# Patient Record
Sex: MALE | Race: WHITE | NOT HISPANIC OR LATINO | Employment: FULL TIME | RURAL
[De-identification: names, ages, dates, MRNs, and addresses within clinical notes are randomized per-mention and may not be internally consistent; named-entity substitution may affect disease eponyms.]

---

## 2020-08-09 NOTE — PROGRESS NOTES
Subjective:  Sergio Sandoval is a 65 y.o. male who presents for       Patient Active Problem List   Diagnosis   • Mixed hyperlipidemia   • Essential hypertension   • Vitamin D deficiency   • Gastroesophageal reflux disease           Current Outpatient Medications:   •  Acetaminophen (TYLENOL 8 HOUR PO), Take 1,000 mg by mouth 3 (Three) Times a Day., Disp: , Rfl:   •  aspirin (ASPIR) 81 MG EC tablet, Take 81 mg by mouth Daily., Disp: , Rfl:   •  carvedilol (COREG) 6.25 MG tablet, Take 6.25 mg by mouth Daily., Disp: , Rfl:   •  celecoxib (CeleBREX) 200 MG capsule, Take 200 mg by mouth Daily., Disp: , Rfl:   •  flecainide (TAMBOCOR) 50 MG tablet, Take 50 mg by mouth 2 (Two) Times a Day., Disp: , Rfl:   •  Glucosamine-Chondroit-Vit C-Mn (GLUCOSAMINE 1500 COMPLEX PO), Take 1,500 mg by mouth 2 (two) times a day., Disp: , Rfl:   •  Multiple Vitamins-Minerals (MULTIVITAMIN WITH MINERALS) tablet tablet, Take 1 tablet by mouth Daily., Disp: , Rfl:   •  omeprazole (priLOSEC) 20 MG capsule, Take 20 mg by mouth Daily., Disp: , Rfl:   •  Potassium 99 MG tablet, Take  by mouth., Disp: , Rfl:   •  simvastatin (ZOCOR) 80 MG tablet, Take 80 mg by mouth Every Night., Disp: , Rfl:   •  telmisartan-hydrochlorothiazide (MICARDIS HCT) 40-12.5 MG per tablet, Take 1 tablet by mouth Daily., Disp: , Rfl:   •  VITAMIN D, ERGOCALCIFEROL, PO, Take  by mouth., Disp: , Rfl:   •  Zinc 50 MG capsule, Take  by mouth., Disp: , Rfl:   •  tamsulosin (Flomax) 0.4 MG capsule 24 hr capsule, Take 1 capsule by mouth Daily., Disp: 30 capsule, Rfl: 3      Pt is 64 yo male with management of, GERD, HTN, HLP,  CAD, sp shoulder surgery, SVT,     8/21/20 Pt is here to establish.  He is from [place]. 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   He served in  [employer] for 20 years and retired in 2008.  He was seeing  PCP in [facility]. Dr. Gaffney as his regularly miliary PCP.  [facility] He has a CMH of HTN and takes   micardis 40-12.5 mg Po q daily along with coreg 6.25 mg PO BID for HLP pt is on simvastatin 80 mg PO qhs. For GERD pt is on prilsoec 20 mg PO q daily. He also has CAD sp stent and is on aspirin 81 mg daily. Coreg 6.25 mg PO BID simvastatin 80 mg PO qhs micardia -HCTZ. He has SVT and takes flecainide.  He Was diagnosed with atrial fibirillation in .  He had cardiac ablation a year later.  He sees Cardiologist in [place]. He has not seen CArdiologist in years. No chest pain no palpations.. He is not seeing any speicalist currently. No alcohol no illicit drug use no illicit drugs. 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   He has been doing that 5 years.. He is happily .  With 2 biological chidren.   Family history includes prostate cancer (father) both parents are .  His main concern is difficulty going to urinate      Hypertension   This is a chronic problem. The current episode started more than 1 month ago. The problem is unchanged. The problem is uncontrolled. Pertinent negatives include no anxiety, blurred vision, chest pain, headaches, malaise/fatigue, neck pain, orthopnea, palpitations, peripheral edema, PND, shortness of breath or sweats. Risk factors for coronary artery disease include male gender and dyslipidemia. Past treatments include beta blockers, angiotensin blockers and diuretics. Current antihypertension treatment includes beta blockers, angiotensin blockers and diuretics. The current treatment provides moderate improvement. There are no compliance problems.  There is no history of angina, kidney disease, CAD/MI, CVA, heart failure, left ventricular hypertrophy, PVD or retinopathy. There is no history of chronic renal disease, coarctation of the aorta, hyperaldosteronism, hypercortisolism, hyperparathyroidism, a hypertension causing med, pheochromocytoma, renovascular disease, sleep apnea or a thyroid problem.   Hyperlipidemia   This is a chronic  problem. The current episode started more than 1 year ago. He has no history of chronic renal disease. Pertinent negatives include no chest pain or shortness of breath. The current treatment provides mild improvement of lipids. There are no compliance problems.    Heart Problem   This is a chronic problem. The current episode started more than 1 year ago. Associated symptoms include arthralgias, fatigue, numbness and weakness. Pertinent negatives include no abdominal pain, anorexia, chest pain, chills, congestion, coughing, diaphoresis, fever, headaches, nausea, neck pain, rash or vomiting. Nothing aggravates the symptoms. Treatments tried: flecainaide  The treatment provided no relief.   Male  Problem   The patient's pertinent negatives include no genital injury, genital itching, genital lesions, pelvic pain, penile discharge, penile pain, priapism, scrotal swelling or testicular pain. Primary symptoms comment: difficulty urinating. This is a new problem. The problem occurs constantly. The problem has been unchanged. Pertinent negatives include no abdominal pain, anorexia, chest pain, chills, constipation, coughing, diarrhea, discolored urine, dysuria, fever, flank pain, frequency, headaches, hematuria, hesitancy, joint pain, joint swelling, nausea, painful intercourse, rash, shortness of breath, urgency, urinary retention or vomiting. Nothing aggravates the symptoms. He has tried nothing for the symptoms. The treatment provided no relief. He is sexually active. There is no history of chlamydia, cryptorchidism, erectile aid use, erectile dysfunction, a femoral hernia, gonorrhea, herpes simplex, HIV, an inguinal hernia, kidney stones, prostatitis, sickle cell disease, syphilis or varicocele.          Review of Systems  Review of Systems   Constitutional: Positive for activity change and fatigue. Negative for appetite change, chills, diaphoresis, fever and malaise/fatigue.   HENT: Negative for congestion, postnasal  drip, rhinorrhea, sinus pressure, sinus pain, sneezing, trouble swallowing and voice change.    Eyes: Negative for blurred vision.   Respiratory: Negative for cough, choking, chest tightness, shortness of breath, wheezing and stridor.    Cardiovascular: Negative for chest pain, palpitations, orthopnea and PND.   Gastrointestinal: Negative for abdominal pain, anorexia, constipation, diarrhea, nausea and vomiting.   Genitourinary: Positive for difficulty urinating. Negative for discharge, dysuria, flank pain, frequency, hesitancy, pelvic pain, penile pain, scrotal swelling, testicular pain and urgency.   Musculoskeletal: Positive for arthralgias. Negative for joint pain and neck pain.   Skin: Negative for rash.   Neurological: Positive for weakness and numbness. Negative for tremors and headaches.       Patient Active Problem List   Diagnosis   • Mixed hyperlipidemia   • Essential hypertension   • Vitamin D deficiency   • Gastroesophageal reflux disease     Past Surgical History:   Procedure Laterality Date   • CARDIAC ABLATION     • SHOULDER SURGERY       Social History     Socioeconomic History   • Marital status:      Spouse name: Not on file   • Number of children: Not on file   • Years of education: Not on file   • Highest education level: Not on file   Tobacco Use   • Smoking status: Never Smoker   • Smokeless tobacco: Never Used   Substance and Sexual Activity   • Alcohol use: Never     Frequency: Never   • Drug use: Never   • Sexual activity: Defer     Family History   Problem Relation Age of Onset   • Cancer Other    • Hypertension Other    • Stroke Other      No results found for any previous visit.      No image results found.    [unfilled]    There is no immunization history on file for this patient.    The following portions of the patient's history were reviewed and updated as appropriate: allergies, current medications, past family history, past medical history, past social history, past  "surgical history and problem list.        Physical Exam  /84 (BP Location: Right arm, Patient Position: Sitting, Cuff Size: Adult)   Pulse 60   Temp 98.2 °F (36.8 °C) Comment: per screener  Ht 179.1 cm (70.5\")   Wt 81 kg (178 lb 9.6 oz)   SpO2 98%   BMI 25.26 kg/m²     Physical Exam   Constitutional: He is oriented to person, place, and time. He appears well-developed and well-nourished.   HENT:   Head: Normocephalic and atraumatic.   Right Ear: External ear normal.   Eyes: Pupils are equal, round, and reactive to light. Conjunctivae and EOM are normal.   Neck: Normal range of motion. Neck supple.   Cardiovascular: Normal rate, regular rhythm and normal heart sounds.   No murmur heard.  Pulmonary/Chest: Effort normal and breath sounds normal. No respiratory distress.   Abdominal: Soft. Bowel sounds are normal. He exhibits no distension. There is no tenderness.   Musculoskeletal: Normal range of motion. He exhibits no edema or deformity.   Neurological: He is alert and oriented to person, place, and time. No cranial nerve deficit.   Skin: Skin is warm. No rash noted. He is not diaphoretic. No erythema. No pallor.   Psychiatric: He has a normal mood and affect. His behavior is normal.   Nursing note and vitals reviewed.      Assessment/Plan    Diagnosis Plan   1. Difficulty urinating  Urinalysis With Microscopic - Urine, Clean Catch    Urine Culture - Urine, Urine, Clean Catch    US Prostate   2. Encounter for screening for endocrine disorder  CBC Auto Differential    Comprehensive Metabolic Panel    Hemoglobin A1c    Lipid Panel    TSH    T4, Free    Vitamin D 25 Hydroxy    Vitamin B12    Hepatitis C antibody   3. Encounter for screening for diabetes mellitus  CBC Auto Differential    Comprehensive Metabolic Panel    Hemoglobin A1c    Lipid Panel    TSH    T4, Free    Vitamin D 25 Hydroxy    Vitamin B12    Hepatitis C antibody   4. Essential hypertension  CBC Auto Differential    Comprehensive Metabolic " Panel    Hemoglobin A1c    Lipid Panel    TSH    T4, Free    Vitamin D 25 Hydroxy    Vitamin B12    Hepatitis C antibody   5. Mixed hyperlipidemia  CBC Auto Differential    Comprehensive Metabolic Panel    Hemoglobin A1c    Lipid Panel    TSH    T4, Free    Vitamin D 25 Hydroxy    Vitamin B12    Hepatitis C antibody   6. Gastroesophageal reflux disease, esophagitis presence not specified  CBC Auto Differential    Comprehensive Metabolic Panel    Hemoglobin A1c    Lipid Panel    TSH    T4, Free    Vitamin D 25 Hydroxy    Vitamin B12    Hepatitis C antibody   7. Vitamin D deficiency  CBC Auto Differential    Comprehensive Metabolic Panel    Hemoglobin A1c    Lipid Panel    TSH    T4, Free    Vitamin D 25 Hydroxy    Vitamin B12    Hepatitis C antibody   8. Need for hepatitis C screening test  CBC Auto Differential    Comprehensive Metabolic Panel    Hemoglobin A1c    Lipid Panel    TSH    T4, Free    Vitamin D 25 Hydroxy    Vitamin B12    Hepatitis C antibody   9. SVT (supraventricular tachycardia) (CMS/HCC)  Ambulatory Referral to Cardiology   10. Family history of prostate cancer  PSA SCREENING    US Prostate   11. Special screening for malignant neoplasm of prostate  PSA SCREENING    US Prostate        -recommend labwork  -recommend colonoscopy screening   -annual physical exam today  -family history prostate cancer/difficulty urinating - check PSA. Also get US of prostate. Start on flomax 0.4 m PO qhs   -hep C screening - hep C antibody  -SVT -on flecanide 50 mg PO BID.  [facility]    -HLP - on simvastatin 80 mg PO qhs  -HTN  - on micadix 40-12.5 mg PO q daily,  Coreg 6.25 mg PO BID,    -GERD - on prilosec 20 mg PO q daily  -vitamin D defiicency -vitamin D once a week   -advised pt to be safe and call with questions and concerns  -advised pt to go to ER or call 911 if symptoms worrisome or severe  -advised pt to followup with specialist and referrals  -advised pt to be safe during COVID-19  pandemic  -total time with pt >25 minutes   --recheck in 4 weeks         This document has been electronically signed by Oneil Jorgensen MD on August 21, 2020 16:03

## 2020-08-21 ENCOUNTER — OFFICE VISIT (OUTPATIENT)
Dept: FAMILY MEDICINE CLINIC | Facility: CLINIC | Age: 65
End: 2020-08-21

## 2020-08-21 VITALS
TEMPERATURE: 98.2 F | SYSTOLIC BLOOD PRESSURE: 144 MMHG | BODY MASS INDEX: 25 KG/M2 | HEART RATE: 60 BPM | WEIGHT: 178.6 LBS | DIASTOLIC BLOOD PRESSURE: 84 MMHG | OXYGEN SATURATION: 98 % | HEIGHT: 71 IN

## 2020-08-21 DIAGNOSIS — E78.2 MIXED HYPERLIPIDEMIA: ICD-10-CM

## 2020-08-21 DIAGNOSIS — K21.9 GASTROESOPHAGEAL REFLUX DISEASE, ESOPHAGITIS PRESENCE NOT SPECIFIED: ICD-10-CM

## 2020-08-21 DIAGNOSIS — Z80.42 FAMILY HISTORY OF PROSTATE CANCER: ICD-10-CM

## 2020-08-21 DIAGNOSIS — I47.1 SVT (SUPRAVENTRICULAR TACHYCARDIA) (HCC): ICD-10-CM

## 2020-08-21 DIAGNOSIS — E55.9 VITAMIN D DEFICIENCY: ICD-10-CM

## 2020-08-21 DIAGNOSIS — I10 ESSENTIAL HYPERTENSION: ICD-10-CM

## 2020-08-21 DIAGNOSIS — Z13.1 ENCOUNTER FOR SCREENING FOR DIABETES MELLITUS: ICD-10-CM

## 2020-08-21 DIAGNOSIS — Z12.5 SPECIAL SCREENING FOR MALIGNANT NEOPLASM OF PROSTATE: ICD-10-CM

## 2020-08-21 DIAGNOSIS — R39.198 DIFFICULTY URINATING: Primary | ICD-10-CM

## 2020-08-21 DIAGNOSIS — Z11.59 NEED FOR HEPATITIS C SCREENING TEST: ICD-10-CM

## 2020-08-21 DIAGNOSIS — Z13.29 ENCOUNTER FOR SCREENING FOR ENDOCRINE DISORDER: ICD-10-CM

## 2020-08-21 PROCEDURE — 99204 OFFICE O/P NEW MOD 45 MIN: CPT | Performed by: FAMILY MEDICINE

## 2020-08-21 RX ORDER — CARVEDILOL 6.25 MG/1
6.25 TABLET ORAL DAILY
COMMUNITY
End: 2021-06-28 | Stop reason: SDUPTHER

## 2020-08-21 RX ORDER — FLECAINIDE ACETATE 50 MG/1
50 TABLET ORAL 2 TIMES DAILY
COMMUNITY
End: 2021-06-28 | Stop reason: SDUPTHER

## 2020-08-21 RX ORDER — SIMVASTATIN 80 MG
80 TABLET ORAL NIGHTLY
COMMUNITY
End: 2021-06-28 | Stop reason: SDUPTHER

## 2020-08-21 RX ORDER — ASPIRIN 81 MG/1
81 TABLET ORAL DAILY
COMMUNITY
End: 2021-06-28 | Stop reason: SDUPTHER

## 2020-08-21 RX ORDER — TAMSULOSIN HYDROCHLORIDE 0.4 MG/1
1 CAPSULE ORAL DAILY
Qty: 30 CAPSULE | Refills: 3 | Status: SHIPPED | OUTPATIENT
Start: 2020-08-21 | End: 2020-09-05 | Stop reason: SDUPTHER

## 2020-08-21 RX ORDER — CELECOXIB 200 MG/1
200 CAPSULE ORAL DAILY
COMMUNITY
End: 2021-06-28 | Stop reason: SDUPTHER

## 2020-08-21 RX ORDER — MULTIPLE VITAMINS W/ MINERALS TAB 9MG-400MCG
1 TAB ORAL DAILY
COMMUNITY

## 2020-08-21 RX ORDER — OMEPRAZOLE 20 MG/1
20 CAPSULE, DELAYED RELEASE ORAL DAILY
COMMUNITY
End: 2021-06-28 | Stop reason: SDUPTHER

## 2020-08-21 RX ORDER — TELMISARTAN AND HYDROCHLORTHIAZIDE 40; 12.5 MG/1; MG/1
1 TABLET ORAL DAILY
COMMUNITY
End: 2021-06-28 | Stop reason: SDUPTHER

## 2020-08-21 NOTE — PATIENT INSTRUCTIONS
Tamsulosin capsules  What is this medicine?  TAMSULOSIN (redd ROBINSON efren sin) is an alpha blocker. It is used to treat the signs and symptoms of an enlarged prostate in men. This condition is also called benign prostatic hyperplasia (BPH).  This medicine may be used for other purposes; ask your health care provider or pharmacist if you have questions.  COMMON BRAND NAME(S): Flomax  What should I tell my health care provider before I take this medicine?  They need to know if you have any of the following conditions:  · advanced kidney disease  · advanced liver disease  · low blood pressure  · prostate cancer  · an unusual or allergic reaction to tamsulosin, sulfa drugs, other medicines, foods, dyes, or preservatives  · pregnant or trying to get pregnant  · breast-feeding  How should I use this medicine?  Take this medicine by mouth about 30 minutes after the same meal every day. Follow the directions on the prescription label. Swallow the capsules whole with a glass of water. Do not crush, chew, or open capsules. Do not take your medicine more often than directed. Do not stop taking your medicine unless your doctor tells you to.  Talk to your pediatrician regarding the use of this medicine in children. Special care may be needed.  Overdosage: If you think you have taken too much of this medicine contact a poison control center or emergency room at once.  NOTE: This medicine is only for you. Do not share this medicine with others.  What if I miss a dose?  If you miss a dose, take it as soon as you can. If it is almost time for your next dose, take only that dose. Do not take double or extra doses. If you stop taking your medicine for several days or more, ask your doctor or health care professional what dose you should start back on.  What may interact with this medicine?  · cimetidine  · fluoxetine  · ketoconazole  · medicines for erectile disfunction like sildenafil, tadalafil, vardenafil  · medicines for high blood  pressure  · other alpha-blockers like alfuzosin, doxazosin, phentolamine, phenoxybenzamine, prazosin, terazosin  · warfarin  This list may not describe all possible interactions. Give your health care provider a list of all the medicines, herbs, non-prescription drugs, or dietary supplements you use. Also tell them if you smoke, drink alcohol, or use illegal drugs. Some items may interact with your medicine.  What should I watch for while using this medicine?  Visit your doctor or health care professional for regular check ups. You will need lab work done before you start this medicine and regularly while you are taking it. Check your blood pressure as directed. Ask your health care professional what your blood pressure should be, and when you should contact him or her.  This medicine may make you feel dizzy or lightheaded. This is more likely to happen after the first dose, after an increase in dose, or during hot weather or exercise. Drinking alcohol and taking some medicines can make this worse. Do not drive, use machinery, or do anything that needs mental alertness until you know how this medicine affects you. Do not sit or stand up quickly. If you begin to feel dizzy, sit down until you feel better. These effects can decrease once your body adjusts to the medicine.  Contact your doctor or health care professional right away if you have an erection that lasts longer than 4 hours or if it becomes painful. This may be a sign of a serious problem and must be treated right away to prevent permanent damage.  If you are thinking of having cataract surgery, tell your eye surgeon that you have taken this medicine.  What side effects may I notice from receiving this medicine?  Side effects that you should report to your doctor or health care professional as soon as possible:  · allergic reactions like skin rash or itching, hives, swelling of the lips, mouth, tongue, or throat  · breathing problems  · change in  vision  · feeling faint or lightheaded  · irregular heartbeat  · prolonged or painful erection  · weakness  Side effects that usually do not require medical attention (report to your doctor or health care professional if they continue or are bothersome):  · back pain  · change in sex drive or performance  · constipation, nausea or vomiting  · cough  · drowsy  · runny or stuffy nose  · trouble sleeping  This list may not describe all possible side effects. Call your doctor for medical advice about side effects. You may report side effects to FDA at 2-797-HLS-8284.  Where should I keep my medicine?  Keep out of the reach of children.  Store at room temperature between 15 and 30 degrees C (59 and 86 degrees F). Throw away any unused medicine after the expiration date.  NOTE: This sheet is a summary. It may not cover all possible information. If you have questions about this medicine, talk to your doctor, pharmacist, or health care provider.  © 2020 [employer] (2019-05-23 12:54:06)

## 2020-08-31 ENCOUNTER — LAB (OUTPATIENT)
Dept: LAB | Facility: HOSPITAL | Age: 65
End: 2020-08-31

## 2020-08-31 DIAGNOSIS — Z11.59 NEED FOR HEPATITIS C SCREENING TEST: ICD-10-CM

## 2020-08-31 DIAGNOSIS — K21.9 GASTROESOPHAGEAL REFLUX DISEASE, ESOPHAGITIS PRESENCE NOT SPECIFIED: ICD-10-CM

## 2020-08-31 DIAGNOSIS — Z13.29 ENCOUNTER FOR SCREENING FOR ENDOCRINE DISORDER: ICD-10-CM

## 2020-08-31 DIAGNOSIS — E55.9 VITAMIN D DEFICIENCY: ICD-10-CM

## 2020-08-31 DIAGNOSIS — R39.198 DIFFICULTY URINATING: ICD-10-CM

## 2020-08-31 DIAGNOSIS — Z80.42 FAMILY HISTORY OF PROSTATE CANCER: ICD-10-CM

## 2020-08-31 DIAGNOSIS — E78.2 MIXED HYPERLIPIDEMIA: ICD-10-CM

## 2020-08-31 DIAGNOSIS — Z13.1 ENCOUNTER FOR SCREENING FOR DIABETES MELLITUS: ICD-10-CM

## 2020-08-31 DIAGNOSIS — Z12.5 SPECIAL SCREENING FOR MALIGNANT NEOPLASM OF PROSTATE: ICD-10-CM

## 2020-08-31 DIAGNOSIS — I10 ESSENTIAL HYPERTENSION: ICD-10-CM

## 2020-08-31 LAB
25(OH)D3 SERPL-MCNC: 61.2 NG/ML (ref 30–100)
ALBUMIN SERPL-MCNC: 4.6 G/DL (ref 3.5–5.2)
ALBUMIN/GLOB SERPL: 1.8 G/DL
ALP SERPL-CCNC: 59 U/L (ref 39–117)
ALT SERPL W P-5'-P-CCNC: 21 U/L (ref 1–41)
ANION GAP SERPL CALCULATED.3IONS-SCNC: 7.8 MMOL/L (ref 5–15)
AST SERPL-CCNC: 22 U/L (ref 1–40)
BACTERIA UR QL AUTO: NORMAL /HPF
BASOPHILS # BLD AUTO: 0.03 10*3/MM3 (ref 0–0.2)
BASOPHILS NFR BLD AUTO: 0.4 % (ref 0–1.5)
BILIRUB SERPL-MCNC: 0.4 MG/DL (ref 0–1.2)
BILIRUB UR QL STRIP: NEGATIVE
BUN SERPL-MCNC: 10 MG/DL (ref 8–23)
BUN/CREAT SERPL: 9 (ref 7–25)
CALCIUM SPEC-SCNC: 9.7 MG/DL (ref 8.6–10.5)
CHLORIDE SERPL-SCNC: 104 MMOL/L (ref 98–107)
CHOLEST SERPL-MCNC: 135 MG/DL (ref 0–200)
CLARITY UR: CLEAR
CO2 SERPL-SCNC: 30.2 MMOL/L (ref 22–29)
COLOR UR: YELLOW
CREAT SERPL-MCNC: 1.11 MG/DL (ref 0.76–1.27)
DEPRECATED RDW RBC AUTO: 40.3 FL (ref 37–54)
EOSINOPHIL # BLD AUTO: 0.06 10*3/MM3 (ref 0–0.4)
EOSINOPHIL NFR BLD AUTO: 0.8 % (ref 0.3–6.2)
ERYTHROCYTE [DISTWIDTH] IN BLOOD BY AUTOMATED COUNT: 11.8 % (ref 12.3–15.4)
GFR SERPL CREATININE-BSD FRML MDRD: 67 ML/MIN/1.73
GLOBULIN UR ELPH-MCNC: 2.5 GM/DL
GLUCOSE SERPL-MCNC: 106 MG/DL (ref 65–99)
GLUCOSE UR STRIP-MCNC: NEGATIVE MG/DL
HBA1C MFR BLD: 5.7 % (ref 4.8–5.6)
HCT VFR BLD AUTO: 43.8 % (ref 37.5–51)
HCV AB SER DONR QL: NORMAL
HDLC SERPL-MCNC: 56 MG/DL (ref 40–60)
HGB BLD-MCNC: 14.4 G/DL (ref 13–17.7)
HGB UR QL STRIP.AUTO: NEGATIVE
HYALINE CASTS UR QL AUTO: NORMAL /LPF
IMM GRANULOCYTES # BLD AUTO: 0.03 10*3/MM3 (ref 0–0.05)
IMM GRANULOCYTES NFR BLD AUTO: 0.4 % (ref 0–0.5)
KETONES UR QL STRIP: ABNORMAL
LDLC SERPL CALC-MCNC: 68 MG/DL (ref 0–100)
LDLC/HDLC SERPL: 1.21 {RATIO}
LEUKOCYTE ESTERASE UR QL STRIP.AUTO: ABNORMAL
LYMPHOCYTES # BLD AUTO: 1.23 10*3/MM3 (ref 0.7–3.1)
LYMPHOCYTES NFR BLD AUTO: 15.7 % (ref 19.6–45.3)
MCH RBC QN AUTO: 30.6 PG (ref 26.6–33)
MCHC RBC AUTO-ENTMCNC: 32.9 G/DL (ref 31.5–35.7)
MCV RBC AUTO: 93 FL (ref 79–97)
MONOCYTES # BLD AUTO: 0.49 10*3/MM3 (ref 0.1–0.9)
MONOCYTES NFR BLD AUTO: 6.2 % (ref 5–12)
NEUTROPHILS NFR BLD AUTO: 6.01 10*3/MM3 (ref 1.7–7)
NEUTROPHILS NFR BLD AUTO: 76.5 % (ref 42.7–76)
NITRITE UR QL STRIP: NEGATIVE
NRBC BLD AUTO-RTO: 0 /100 WBC (ref 0–0.2)
PH UR STRIP.AUTO: 8 [PH] (ref 5–8)
PLATELET # BLD AUTO: 109 10*3/MM3 (ref 140–450)
PMV BLD AUTO: 10.4 FL (ref 6–12)
POTASSIUM SERPL-SCNC: 4.4 MMOL/L (ref 3.5–5.2)
PROT SERPL-MCNC: 7.1 G/DL (ref 6–8.5)
PROT UR QL STRIP: ABNORMAL
PSA SERPL-MCNC: 1 NG/ML (ref 0–4)
RBC # BLD AUTO: 4.71 10*6/MM3 (ref 4.14–5.8)
RBC # UR: NORMAL /HPF
REF LAB TEST METHOD: NORMAL
SODIUM SERPL-SCNC: 142 MMOL/L (ref 136–145)
SP GR UR STRIP: 1.01 (ref 1–1.03)
SQUAMOUS #/AREA URNS HPF: NORMAL /HPF
T4 FREE SERPL-MCNC: 1.3 NG/DL (ref 0.93–1.7)
TRIGL SERPL-MCNC: 55 MG/DL (ref 0–150)
TSH SERPL DL<=0.05 MIU/L-ACNC: 0.52 UIU/ML (ref 0.27–4.2)
UROBILINOGEN UR QL STRIP: ABNORMAL
VIT B12 BLD-MCNC: 594 PG/ML (ref 211–946)
VLDLC SERPL-MCNC: 11 MG/DL (ref 5–40)
WBC # BLD AUTO: 7.85 10*3/MM3 (ref 3.4–10.8)
WBC UR QL AUTO: NORMAL /HPF

## 2020-08-31 PROCEDURE — 81003 URINALYSIS AUTO W/O SCOPE: CPT

## 2020-08-31 PROCEDURE — 82306 VITAMIN D 25 HYDROXY: CPT

## 2020-08-31 PROCEDURE — 80053 COMPREHEN METABOLIC PANEL: CPT

## 2020-08-31 PROCEDURE — G0103 PSA SCREENING: HCPCS

## 2020-08-31 PROCEDURE — 85025 COMPLETE CBC W/AUTO DIFF WBC: CPT

## 2020-08-31 PROCEDURE — 82607 VITAMIN B-12: CPT

## 2020-08-31 PROCEDURE — 83036 HEMOGLOBIN GLYCOSYLATED A1C: CPT

## 2020-08-31 PROCEDURE — 84443 ASSAY THYROID STIM HORMONE: CPT

## 2020-08-31 PROCEDURE — 87086 URINE CULTURE/COLONY COUNT: CPT

## 2020-08-31 PROCEDURE — 86803 HEPATITIS C AB TEST: CPT

## 2020-08-31 PROCEDURE — 80061 LIPID PANEL: CPT

## 2020-08-31 PROCEDURE — 81015 MICROSCOPIC EXAM OF URINE: CPT

## 2020-08-31 PROCEDURE — 84439 ASSAY OF FREE THYROXINE: CPT

## 2020-09-01 ENCOUNTER — TELEPHONE (OUTPATIENT)
Dept: FAMILY MEDICINE CLINIC | Facility: CLINIC | Age: 65
End: 2020-09-01

## 2020-09-01 LAB — BACTERIA SPEC AEROBE CULT: NO GROWTH

## 2020-09-01 NOTE — TELEPHONE ENCOUNTER
----- Message from Oneil Jorgensen MD sent at 9/1/2020  8:41 AM CDT -----  Please call pt    Vitamin D levels normal     Vitamin B12 levels normal    Hep C antibody test negative    hga1c at 5.7 pt is prediabetic. Will need to watch sugar and carb intake. If hga1c gets >6.5 pt will be diabetic     Thyroid studies normal     On MP kidney function shows GFR at 67 and recommend more water intake.  Liver enzymes normal     Lipid panel is stable    PSA or prostate specific antigen test normal     Urinalysis normal    On CBC hemoglobin stable at 14.4. Platelets are low at 109 and please ask if pt is having brusing or bleeding issues. This helps with clotting blood.  Will need to monitor and consider Hematology referral if consistently low.    Recheck on next visit thanks

## 2020-09-02 NOTE — PROGRESS NOTES
Subjective:  Sergio Sandoval is a 65 y.o. male who presents for       Patient Active Problem List   Diagnosis   • Mixed hyperlipidemia   • Essential hypertension   • Vitamin D deficiency   • Gastroesophageal reflux disease   • Benign prostatic hyperplasia with lower urinary tract symptoms   • Benign prostatic hyperplasia with weak urinary stream   • Prediabetes   • Dizziness   • Thrombocytopenia (CMS/HCC)           Current Outpatient Medications:   •  Acetaminophen (TYLENOL 8 HOUR PO), Take 1,000 mg by mouth 3 (Three) Times a Day., Disp: , Rfl:   •  aspirin (ASPIR) 81 MG EC tablet, Take 81 mg by mouth Daily., Disp: , Rfl:   •  carvedilol (COREG) 6.25 MG tablet, Take 6.25 mg by mouth Daily., Disp: , Rfl:   •  celecoxib (CeleBREX) 200 MG capsule, Take 200 mg by mouth Daily., Disp: , Rfl:   •  flecainide (TAMBOCOR) 50 MG tablet, Take 50 mg by mouth 2 (Two) Times a Day., Disp: , Rfl:   •  Glucosamine-Chondroit-Vit C-Mn (GLUCOSAMINE 1500 COMPLEX PO), Take 1,500 mg by mouth 2 (two) times a day., Disp: , Rfl:   •  Multiple Vitamins-Minerals (MULTIVITAMIN WITH MINERALS) tablet tablet, Take 1 tablet by mouth Daily., Disp: , Rfl:   •  omeprazole (priLOSEC) 20 MG capsule, Take 20 mg by mouth Daily., Disp: , Rfl:   •  Potassium 99 MG tablet, Take  by mouth., Disp: , Rfl:   •  simvastatin (ZOCOR) 80 MG tablet, Take 80 mg by mouth Every Night., Disp: , Rfl:   •  tamsulosin (Flomax) 0.4 MG capsule 24 hr capsule, Take 1 capsule by mouth Daily., Disp: 30 capsule, Rfl: 3  •  telmisartan-hydrochlorothiazide (MICARDIS HCT) 40-12.5 MG per tablet, Take 1 tablet by mouth Daily., Disp: , Rfl:   •  VITAMIN D, ERGOCALCIFEROL, PO, Take  by mouth., Disp: , Rfl:   •  Zinc 50 MG capsule, Take  by mouth., Disp: , Rfl:     HPI        Pt is 64 yo male with management of, GERD, HTN, HLP,  CAD, sp shoulder surgery, SVT, prediabetes, thrombocytopenia      8/21/20 Pt is here to establish.  He is from [place]. 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   He served in  [employer] [facility] for 20 years and retired in .  He was seeing  PCP in [facility]. Dr. Gaffney as his regularly [facility] PCP.  [facility]. He has a CM[facility] of HTN and takes  micardis 40-12.5 mg Po q daily along with coreg 6.25 mg PO BID for HLP pt is on simvastatin 80 mg PO qhs. For GERD pt is on prilsoec 20 mg PO q daily. He also has CAD sp stent and is on aspirin 81 mg daily. Coreg 6.25 mg PO BID simvastatin 80 mg PO qhs micardia -HCTZ. He has SVT and takes flecainide.  He Was diagnosed with atrial fibirillation in .  He had cardiac ablation a year later.  He sees Cardiologist in [place]. He has not seen CArdiologist in years. No chest pain no palpations.. He is not seeing any speicalist currently. No alcohol no illicit drug use no illicit drugs. 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   He has been doing that 5 years.. He is happily .  With 2 biological chidren.   Family history includes prostate cancer (father) both parents are .  His main concern is difficulty going to urinate      9/15/20 in office visit for recheck on pt's above medical issues. Had labwork done on 20 that showed normal PSA at 1.00. hep C screening negative. Vitamin B12 and Vitamin D levels normal thyroid studies normal lipid panel stable. hga1c at 5.7. CMP showed GFR at 67 with liver enzymes normal. CBC showed. platelets low at 109 and normal hemoglobin and normal WBC. On last visit pt was having issues with difficulty urinating and had US of prostate done on 9/10/20 at imaging center that showed postvoid volume of 126 ml. Prostate was at 6.3 cm in diameter and has enlarged prostate. Pt was started on flomax 0.4 mg pO qhs.  He states that since starting flomax he has had improvement with urinating less frequently at bedtime. He does state some vertigo if you stand up too quickly.       Hypertension   This is a chronic problem. The  current episode started more than 1 month ago. The problem is unchanged. The problem is uncontrolled. Pertinent negatives include no anxiety, blurred vision, chest pain, headaches, malaise/fatigue, neck pain, orthopnea, palpitations, peripheral edema, PND, shortness of breath or sweats. Risk factors for coronary artery disease include male gender and dyslipidemia. Past treatments include beta blockers, angiotensin blockers and diuretics. Current antihypertension treatment includes beta blockers, angiotensin blockers and diuretics. The current treatment provides moderate improvement. There are no compliance problems.  There is no history of angina, kidney disease, CAD/MI, CVA, heart failure, left ventricular hypertrophy, PVD or retinopathy. There is no history of chronic renal disease, coarctation of the aorta, hyperaldosteronism, hypercortisolism, hyperparathyroidism, a hypertension causing med, pheochromocytoma, renovascular disease, sleep apnea or a thyroid problem.   Hyperlipidemia   This is a chronic problem. The current episode started more than 1 year ago. He has no history of chronic renal disease. Pertinent negatives include no chest pain or shortness of breath. The current treatment provides mild improvement of lipids. There are no compliance problems.      Male  Problem   The patient's pertinent negatives include no genital injury, genital itching, genital lesions, pelvic pain, penile discharge, penile pain, priapism, scrotal swelling or testicular pain. Primary symptoms comment: difficulty urinating. This is a new problem. The problem occurs constantly. The problem has been improving  Pertinent negatives include no abdominal pain, anorexia, chest pain, chills, constipation, coughing, diarrhea, discolored urine, dysuria, fever, flank pain, frequency, headaches, hematuria, hesitancy, joint pain, joint swelling, nausea, painful intercourse, rash, shortness of breath, urgency, urinary retention or vomiting.  Nothing aggravates the symptoms. He has tried nothing for the symptoms. The treatment provided no relief. He is sexually active. There is no history of chlamydia, cryptorchidism, erectile aid use, erectile dysfunction, a femoral hernia, gonorrhea, herpes simplex, HIV, an inguinal hernia, kidney stones, prostatitis, sickle cell disease, syphilis or varicocele. Has tried flomax and had significant relief          Review of Systems  Review of Systems   Constitutional: Positive for activity change and fatigue.   Genitourinary: Positive for difficulty urinating and dysuria.   Musculoskeletal: Positive for arthralgias.   Neurological: Positive for weakness and numbness. Negative for tremors.   All other systems reviewed and are negative.      Patient Active Problem List   Diagnosis   • Mixed hyperlipidemia   • Essential hypertension   • Vitamin D deficiency   • Gastroesophageal reflux disease   • Benign prostatic hyperplasia with lower urinary tract symptoms   • Benign prostatic hyperplasia with weak urinary stream   • Prediabetes   • Dizziness   • Thrombocytopenia (CMS/HCC)     Past Surgical History:   Procedure Laterality Date   • CARDIAC ABLATION     • SHOULDER SURGERY       Social History     Socioeconomic History   • Marital status:      Spouse name: Not on file   • Number of children: Not on file   • Years of education: Not on file   • Highest education level: Not on file   Tobacco Use   • Smoking status: Never Smoker   • Smokeless tobacco: Never Used   Substance and Sexual Activity   • Alcohol use: Never     Frequency: Never   • Drug use: Never   • Sexual activity: Defer     Family History   Problem Relation Age of Onset   • Cancer Other    • Hypertension Other    • Stroke Other      Lab on 08/31/2020   Component Date Value Ref Range Status   • WBC 08/31/2020 7.85  3.40 - 10.80 10*3/mm3 Final   • RBC 08/31/2020 4.71  4.14 - 5.80 10*6/mm3 Final   • Hemoglobin 08/31/2020 14.4  13.0 - 17.7 g/dL Final   •  Hematocrit 08/31/2020 43.8  37.5 - 51.0 % Final   • MCV 08/31/2020 93.0  79.0 - 97.0 fL Final   • MCH 08/31/2020 30.6  26.6 - 33.0 pg Final   • MCHC 08/31/2020 32.9  31.5 - 35.7 g/dL Final   • RDW 08/31/2020 11.8* 12.3 - 15.4 % Final   • RDW-SD 08/31/2020 40.3  37.0 - 54.0 fl Final   • MPV 08/31/2020 10.4  6.0 - 12.0 fL Final   • Platelets 08/31/2020 109* 140 - 450 10*3/mm3 Final   • Neutrophil % 08/31/2020 76.5* 42.7 - 76.0 % Final   • Lymphocyte % 08/31/2020 15.7* 19.6 - 45.3 % Final   • Monocyte % 08/31/2020 6.2  5.0 - 12.0 % Final   • Eosinophil % 08/31/2020 0.8  0.3 - 6.2 % Final   • Basophil % 08/31/2020 0.4  0.0 - 1.5 % Final   • Immature Grans % 08/31/2020 0.4  0.0 - 0.5 % Final   • Neutrophils, Absolute 08/31/2020 6.01  1.70 - 7.00 10*3/mm3 Final   • Lymphocytes, Absolute 08/31/2020 1.23  0.70 - 3.10 10*3/mm3 Final   • Monocytes, Absolute 08/31/2020 0.49  0.10 - 0.90 10*3/mm3 Final   • Eosinophils, Absolute 08/31/2020 0.06  0.00 - 0.40 10*3/mm3 Final   • Basophils, Absolute 08/31/2020 0.03  0.00 - 0.20 10*3/mm3 Final   • Immature Grans, Absolute 08/31/2020 0.03  0.00 - 0.05 10*3/mm3 Final   • nRBC 08/31/2020 0.0  0.0 - 0.2 /100 WBC Final   • Glucose 08/31/2020 106* 65 - 99 mg/dL Final   • BUN 08/31/2020 10  8 - 23 mg/dL Final   • Creatinine 08/31/2020 1.11  0.76 - 1.27 mg/dL Final   • Sodium 08/31/2020 142  136 - 145 mmol/L Final   • Potassium 08/31/2020 4.4  3.5 - 5.2 mmol/L Final   • Chloride 08/31/2020 104  98 - 107 mmol/L Final   • CO2 08/31/2020 30.2* 22.0 - 29.0 mmol/L Final   • Calcium 08/31/2020 9.7  8.6 - 10.5 mg/dL Final   • Total Protein 08/31/2020 7.1  6.0 - 8.5 g/dL Final   • Albumin 08/31/2020 4.60  3.50 - 5.20 g/dL Final   • ALT (SGPT) 08/31/2020 21  1 - 41 U/L Final   • AST (SGOT) 08/31/2020 22  1 - 40 U/L Final   • Alkaline Phosphatase 08/31/2020 59  39 - 117 U/L Final   • Total Bilirubin 08/31/2020 0.4  0.0 - 1.2 mg/dL Final   • eGFR Non African Amer 08/31/2020 67  >60 mL/min/1.73 Final    • Globulin 08/31/2020 2.5  gm/dL Final   • A/G Ratio 08/31/2020 1.8  g/dL Final   • BUN/Creatinine Ratio 08/31/2020 9.0  7.0 - 25.0 Final   • Anion Gap 08/31/2020 7.8  5.0 - 15.0 mmol/L Final   • Hemoglobin A1C 08/31/2020 5.70* 4.80 - 5.60 % Final   • Total Cholesterol 08/31/2020 135  0 - 200 mg/dL Final   • Triglycerides 08/31/2020 55  0 - 150 mg/dL Final   • HDL Cholesterol 08/31/2020 56  40 - 60 mg/dL Final   • LDL Cholesterol  08/31/2020 68  0 - 100 mg/dL Final   • VLDL Cholesterol 08/31/2020 11  5 - 40 mg/dL Final   • LDL/HDL Ratio 08/31/2020 1.21   Final   • TSH 08/31/2020 0.518  0.270 - 4.200 uIU/mL Final   • Free T4 08/31/2020 1.30  0.93 - 1.70 ng/dL Final   • 25 Hydroxy, Vitamin D 08/31/2020 61.2  30.0 - 100.0 ng/ml Final   • Vitamin B-12 08/31/2020 594  211 - 946 pg/mL Final   • Hepatitis C Ab 08/31/2020 Non-Reactive  Non-Reactive Final   • PSA 08/31/2020 1.000  0.000 - 4.000 ng/mL Final   • Urine Culture 08/31/2020 No growth   Final   • Color, UA 08/31/2020 Yellow  Yellow, Straw Final   • Appearance, UA 08/31/2020 Clear  Clear Final   • pH, UA 08/31/2020 8.0  5.0 - 8.0 Final   • Specific Gravity, UA 08/31/2020 1.010  1.005 - 1.030 Final   • Glucose, UA 08/31/2020 Negative  Negative Final   • Ketones, UA 08/31/2020 15 mg/dL (1+)* Negative Final   • Bilirubin, UA 08/31/2020 Negative  Negative Final   • Blood, UA 08/31/2020 Negative  Negative Final   • Protein, UA 08/31/2020 Trace* Negative Final   • Leuk Esterase, UA 08/31/2020 Small (1+)* Negative Final   • Nitrite, UA 08/31/2020 Negative  Negative Final   • Urobilinogen, UA 08/31/2020 0.2 E.U./dL  0.2 - 1.0 E.U./dL Final   • RBC, UA 08/31/2020 0-2  None Seen, 0-2 /HPF Final   • WBC, UA 08/31/2020 0-2  None Seen, 0-2 /HPF Final   • Bacteria, UA 08/31/2020 None Seen  None Seen /HPF Final   • Squamous Epithelial Cells, UA 08/31/2020 0-2  None Seen, 0-2 /HPF Final   • Hyaline Casts, UA 08/31/2020 None Seen  None Seen /LPF Final   • Methodology 08/31/2020  "Automated Microscopy   Final      No image results found.    [unfilled]  Immunization History   Administered Date(s) Administered   • Flulaval/Fluarix Quad 09/15/2020       The following portions of the patient's history were reviewed and updated as appropriate: allergies, current medications, past family history, past medical history, past social history, past surgical history and problem list.        Physical Exam  /80 (BP Location: Right arm, Patient Position: Sitting, Cuff Size: Adult)   Pulse 62   Temp 97.7 °F (36.5 °C)   Ht 179.1 cm (70.5\")   Wt 83.3 kg (183 lb 9.6 oz)   SpO2 98%   BMI 25.97 kg/m²     Physical Exam   Constitutional: He is oriented to person, place, and time. He appears well-developed.   HENT:   Head: Normocephalic and atraumatic.   Right Ear: External ear normal.   Eyes: Pupils are equal, round, and reactive to light. Conjunctivae are normal.   Neck: Normal range of motion. Neck supple.   Cardiovascular: Normal rate, regular rhythm and normal heart sounds.   No murmur heard.  Pulmonary/Chest: Effort normal and breath sounds normal. No respiratory distress.   Abdominal: Soft. Bowel sounds are normal. He exhibits no distension. There is no abdominal tenderness.   Musculoskeletal: Normal range of motion. No deformity.   Neurological: He is alert and oriented to person, place, and time. No cranial nerve deficit.   Skin: Skin is warm. No rash noted. He is not diaphoretic. No erythema. No pallor.   Psychiatric: His behavior is normal.   Nursing note and vitals reviewed.      Assessment/Plan    Diagnosis Plan   1. Prediabetes  CBC Auto Differential    Comprehensive Metabolic Panel    Hemoglobin A1c   2. Mixed hyperlipidemia     3. Essential hypertension     4. Vitamin D deficiency     5. Gastroesophageal reflux disease, esophagitis presence not specified     6. Benign prostatic hyperplasia with weak urinary stream     7. Dizziness     8. Thrombocytopenia (CMS/HCC)  CBC Auto " Differential    Comprehensive Metabolic Panel    Hemoglobin A1c   9. Need for immunization against influenza  FluLaval Quad >6 Months ([id number])        -went over labwork  -recommend influenza vaccination -given today   -recommend tdap vaccination  -recommend colonoscopy screening - was done in 2 years. Will get records from [facility]   -dizziness - may be due to  Flomax. Start on flomax every other night instead of nightly.    -BPH - reviewed recent US of prostate.- on flomax 0.4 mg PO qhs.    -prediabetes - prediabetes meal plan information provided  -thrombocytopenia - continue to monitor. Pt reports no active bleeding or bruising. Consider Hematology referral   -family history prostate cancer/difficulty urinating/BPH - recent PSA normal .recent US of prostate shows enlarged prostate . Start on flomax 0.4 m PO qhs   -hep C screening - hep C antibody  -SVT -on flecanide 50 mg PO BID.  [facility]    -HLP - on simvastatin 80 mg PO qhs  -HTN  - on micadix 40-12.5 mg PO q daily,  Coreg 6.25 mg PO BID,    -GERD - on prilosec 20 mg PO q daily  -vitamin D defiicency -vitamin D once a week   -advised pt to be safe and call with questions and concerns  -advised pt to go to ER or call 911 if symptoms worrisome or severe  -advised pt to followup with specialist and referrals  -advised pt to be safe during COVID-19 pandemic  -total time with pt >25 minutes    -recheck in 3 months         This document has been electronically signed by Oneil Jorgensen MD on September 15, 2020 12:08 CDT                Answers for HPI/ROS submitted by the patient on 9/14/2020   What is the primary reason for your visit?: Other  Please describe your symptoms.: New patient  Have you had these symptoms before?: No  How long have you been having these symptoms?: 1-4 days

## 2020-09-08 RX ORDER — TAMSULOSIN HYDROCHLORIDE 0.4 MG/1
1 CAPSULE ORAL DAILY
Qty: 30 CAPSULE | Refills: 3 | Status: SHIPPED | OUTPATIENT
Start: 2020-09-08 | End: 2020-09-27 | Stop reason: SDUPTHER

## 2020-09-09 ENCOUNTER — TELEPHONE (OUTPATIENT)
Dept: FAMILY MEDICINE CLINIC | Facility: CLINIC | Age: 65
End: 2020-09-09

## 2020-09-09 DIAGNOSIS — R39.198 DIFFICULTY URINATING: ICD-10-CM

## 2020-09-09 DIAGNOSIS — Z80.42 FAMILY HISTORY OF PROSTATE CANCER: Primary | ICD-10-CM

## 2020-09-09 DIAGNOSIS — Z12.5 SPECIAL SCREENING FOR MALIGNANT NEOPLASM OF PROSTATE: ICD-10-CM

## 2020-09-15 ENCOUNTER — OFFICE VISIT (OUTPATIENT)
Dept: FAMILY MEDICINE CLINIC | Facility: CLINIC | Age: 65
End: 2020-09-15

## 2020-09-15 VITALS
WEIGHT: 183.6 LBS | OXYGEN SATURATION: 98 % | HEIGHT: 71 IN | SYSTOLIC BLOOD PRESSURE: 108 MMHG | HEART RATE: 62 BPM | DIASTOLIC BLOOD PRESSURE: 80 MMHG | TEMPERATURE: 97.7 F | BODY MASS INDEX: 25.7 KG/M2

## 2020-09-15 DIAGNOSIS — I10 ESSENTIAL HYPERTENSION: ICD-10-CM

## 2020-09-15 DIAGNOSIS — D69.6 THROMBOCYTOPENIA (HCC): ICD-10-CM

## 2020-09-15 DIAGNOSIS — R42 DIZZINESS: ICD-10-CM

## 2020-09-15 DIAGNOSIS — R73.03 PREDIABETES: Primary | ICD-10-CM

## 2020-09-15 DIAGNOSIS — N40.1 BENIGN PROSTATIC HYPERPLASIA WITH WEAK URINARY STREAM: ICD-10-CM

## 2020-09-15 DIAGNOSIS — Z23 NEED FOR IMMUNIZATION AGAINST INFLUENZA: ICD-10-CM

## 2020-09-15 DIAGNOSIS — E78.2 MIXED HYPERLIPIDEMIA: ICD-10-CM

## 2020-09-15 DIAGNOSIS — E55.9 VITAMIN D DEFICIENCY: ICD-10-CM

## 2020-09-15 DIAGNOSIS — R39.12 BENIGN PROSTATIC HYPERPLASIA WITH WEAK URINARY STREAM: ICD-10-CM

## 2020-09-15 DIAGNOSIS — K21.9 GASTROESOPHAGEAL REFLUX DISEASE, ESOPHAGITIS PRESENCE NOT SPECIFIED: ICD-10-CM

## 2020-09-15 PROCEDURE — 90686 IIV4 VACC NO PRSV 0.5 ML IM: CPT | Performed by: FAMILY MEDICINE

## 2020-09-15 PROCEDURE — 90471 IMMUNIZATION ADMIN: CPT | Performed by: FAMILY MEDICINE

## 2020-09-15 PROCEDURE — 99214 OFFICE O/P EST MOD 30 MIN: CPT | Performed by: FAMILY MEDICINE

## 2020-09-17 DIAGNOSIS — Z80.42 FAMILY HISTORY OF PROSTATE CANCER: ICD-10-CM

## 2020-09-17 DIAGNOSIS — Z12.5 SPECIAL SCREENING FOR MALIGNANT NEOPLASM OF PROSTATE: ICD-10-CM

## 2020-09-17 DIAGNOSIS — R39.198 DIFFICULTY URINATING: ICD-10-CM

## 2020-09-28 RX ORDER — TAMSULOSIN HYDROCHLORIDE 0.4 MG/1
1 CAPSULE ORAL DAILY
Qty: 30 CAPSULE | Refills: 3 | Status: SHIPPED | OUTPATIENT
Start: 2020-09-28 | End: 2020-10-11 | Stop reason: SDUPTHER

## 2020-10-12 RX ORDER — TAMSULOSIN HYDROCHLORIDE 0.4 MG/1
1 CAPSULE ORAL DAILY
Qty: 30 CAPSULE | Refills: 3 | Status: SHIPPED | OUTPATIENT
Start: 2020-10-12 | End: 2020-11-15 | Stop reason: SDUPTHER

## 2020-11-16 RX ORDER — TAMSULOSIN HYDROCHLORIDE 0.4 MG/1
1 CAPSULE ORAL DAILY
Qty: 30 CAPSULE | Refills: 3 | Status: SHIPPED | OUTPATIENT
Start: 2020-11-16 | End: 2021-06-28 | Stop reason: SDUPTHER

## 2020-12-01 ENCOUNTER — TELEPHONE (OUTPATIENT)
Dept: FAMILY MEDICINE CLINIC | Facility: CLINIC | Age: 65
End: 2020-12-01

## 2021-06-14 NOTE — PROGRESS NOTES
Chief Complaint  Med Refill, Skin Lesion, Hypertension, Hyperlipidemia, Vitamin D Deficiency, Prediabetes, and Heartburn    Subjective          Sergio Sandoval presents to [facility]     Pt is 64 yo male with management of, GERD, HTN, HLP,  CAD, sp shoulder surgery, SVT, prediabetes, thrombocytopenia         9/15/20 in office visit for recheck on pt's above medical issues. Had labwork done on 8/31/20 that showed normal PSA at 1.00. hep C screening negative. Vitamin B12 and Vitamin D levels normal thyroid studies normal lipid panel stable. hga1c at 5.7. CMP showed GFR at 67 with liver enzymes normal. CBC showed. platelets low at 109 and normal hemoglobin and normal WBC. On last visit pt was having issues with difficulty urinating and had US of prostate done on 9/10/20 at [facility] center that showed postvoid volume of 126 ml. Prostate was at 6.3 cm in diameter and has enlarged prostate. Pt was started on flomax 0.4 mg pO qhs.  He states that since starting flomax he has had improvement with urinating less frequently at bedtime. He does state some vertigo if you stand up too quickly.      6/28/21 in office visit for recheck on pt's above medical issues. Pt has yet to get labwork ordered on 9/15/20 . Pt continues to take his medications for HTN, HLP, CAD, SVT, GERD. [facility] and he has yet to see one but now he has Medicare. Pt has history of SVT diagnosed in 1992 while serving in  . He had radiofrequency ablation for his SVT 1993. Done  In [place] at [facility]. He had a cyrogenic ablation in 2013 for his atrial fibrillation. After medications failed to control it.  He has been in NSR since then.  Pt also has a cyst on his middle back that has been present for years. It is getting bigger and it can get irritated at times. Currently there is no drainage     Heart Problem  This is a chronic problem. The current episode started more than 1 year  ago. The problem occurs constantly. The problem has been unchanged. Associated symptoms include arthralgias and fatigue. Pertinent negatives include no abdominal pain, anorexia, change in bowel habit, chest pain, chills, congestion, coughing, diaphoresis, fever, headaches, joint swelling, myalgias, nausea, neck pain, numbness, sore throat, swollen glands, urinary symptoms, vertigo, visual change, vomiting or weakness. The symptoms are aggravated by bending. Treatments tried: flecanaide  The treatment provided significant relief.   Hypertension   This is a chronic problem. The current episode started more than 1 month ago. The problem is unchanged. The problem is uncontrolled. Pertinent negatives include no anxiety, blurred vision, chest pain, headaches, malaise/fatigue, neck pain, orthopnea, palpitations, peripheral edema, PND, shortness of breath or sweats. Risk factors for coronary artery disease include male gender and dyslipidemia. Past treatments include beta blockers, angiotensin blockers and diuretics. Current antihypertension treatment includes beta blockers, angiotensin blockers and diuretics. The current treatment provides moderate improvement. There are no compliance problems.  There is no history of angina, kidney disease, CAD/MI, CVA, heart failure, left ventricular hypertrophy, PVD or retinopathy. There is no history of chronic renal disease, coarctation of the aorta, hyperaldosteronism, hypercortisolism, hyperparathyroidism, a hypertension causing med, pheochromocytoma, renovascular disease, sleep apnea or a thyroid problem.   Hyperlipidemia   This is a chronic problem. The current episode started more than 1 year ago. He has no history of chronic renal disease. Pertinent negatives include no chest pain or shortness of breath. The current treatment provides mild improvement of lipids. There are no compliance problems.       Male  Problem   The patient's pertinent negatives include no genital  "injury, genital itching, genital lesions, pelvic pain, penile discharge, penile pain, priapism, scrotal swelling or testicular pain. Primary symptoms comment: difficulty urinating. This is a new problem. The problem occurs constantly. The problem has been improving  Pertinent negatives include no abdominal pain, anorexia, chest pain, chills, constipation, coughing, diarrhea, discolored urine, dysuria, fever, flank pain, frequency, headaches, hematuria, hesitancy, joint pain, joint swelling, nausea, painful intercourse, rash, shortness of breath, urgency, urinary retention or vomiting. Nothing aggravates the symptoms. He has tried nothing for the symptoms. The treatment provided no relief. He is sexually active. There is no history of chlamydia, cryptorchidism, erectile aid use, erectile dysfunction, a femoral hernia, gonorrhea, herpes simplex, HIV, an inguinal hernia, kidney stones, prostatitis, sickle cell disease, syphilis or varicocele. Has tried flomax and had significant relief   Objective   Vital Signs:   /90 (BP Location: Left arm, Patient Position: Sitting, Cuff Size: Adult)   Pulse 80   Temp 97.3 °F (36.3 °C)   Ht 177.8 cm (70\")   Wt 84.4 kg (186 lb)   SpO2 98%   BMI 26.69 kg/m²     Physical Exam  Vitals and nursing note reviewed.   Constitutional:       Appearance: He is well-developed. He is not diaphoretic.   HENT:      Head: Normocephalic and atraumatic.      Right Ear: External ear normal.   Eyes:      Conjunctiva/sclera: Conjunctivae normal.      Pupils: Pupils are equal, round, and reactive to light.   Cardiovascular:      Rate and Rhythm: Normal rate and regular rhythm.      Heart sounds: Normal heart sounds. No murmur heard.     Pulmonary:      Effort: Pulmonary effort is normal. No respiratory distress.      Breath sounds: Normal breath sounds.   Abdominal:      General: Bowel sounds are normal. There is no distension.      Palpations: Abdomen is soft.      Tenderness: There is no " abdominal tenderness.   Musculoskeletal:         General: Tenderness present. No deformity. Normal range of motion.      Cervical back: Normal range of motion and neck supple.   Skin:     General: Skin is warm.      Coloration: Skin is not pale.      Findings: No erythema or rash.          Neurological:      Mental Status: He is alert and oriented to person, place, and time.      Cranial Nerves: No cranial nerve deficit.   Psychiatric:         Behavior: Behavior normal.        Result Review :                 Assessment and Plan    Diagnoses and all orders for this visit:    1. Epidermoid cyst (Primary)  -     Ambulatory Referral to General Surgery    2. Mixed hyperlipidemia  -     Lipid panel; Future    3. Gastroesophageal reflux disease, unspecified whether esophagitis present    4. Essential hypertension    5. Vitamin D deficiency  -     Vitamin D 25 hydroxy; Future    6. Thrombocytopenia (CMS/HCC)    7. SVT (supraventricular tachycardia) (CMS/[facility])  -     Ambulatory Referral to Cardiology    8. Overweight    9. History of atrial fibrillation  -     Ambulatory Referral to Cardiology    Other orders  -     aspirin (aspirin) 81 MG EC tablet; Take 1 tablet by mouth Daily.  Dispense: 30 tablet; Refill: 1  -     carvedilol (COREG) 6.25 MG tablet; Take 1 tablet by mouth 2 (Two) Times a Day With Meals.  Dispense: 60 tablet; Refill: 1  -     celecoxib (CeleBREX) 200 MG capsule; Take 1 capsule by mouth Daily.  Dispense: 30 capsule; Refill: 1  -     flecainide (TAMBOCOR) 50 MG tablet; Take 1 tablet by mouth 2 (Two) Times a Day.  Dispense: 60 tablet; Refill: 1  -     omeprazole (priLOSEC) 20 MG capsule; Take 1 capsule by mouth Daily.  Dispense: 30 capsule; Refill: 1  -     simvastatin (ZOCOR) 80 MG tablet; Take 1 tablet by mouth Every Night.  Dispense: 30 tablet; Refill: 1  -     tamsulosin (Flomax) 0.4 MG capsule 24 hr capsule; Take 1 capsule by mouth Daily.  Dispense: 30 capsule; Refill: 3  -      telmisartan-hydrochlorothiazide (MICARDIS HCT) 40-12.5 MG per tablet; Take 1 tablet by mouth Daily.  Dispense: 30 tablet; Refill: 1            -recommend labowrk  -recommend COVID-19 vaccination  -recommend shingles/pneumonia vaccination   -recommend tdap vaccination  -hold apsirin for now if taking celebrex   -epidermoid cyst - will refer to General Surgeyr   -dizziness - may be due to  Flomax. Start on flomax every other night instead of nightly.    -overweight -BMI at 26.69   -BPH - reviewed recent US of prostate.- on flomax 0.4 mg PO qhs.    -prediabetes - prediabetes meal plan information provided  -thrombocytopenia - continue to monitor. Pt reports no active bleeding or bruising. Consider Hematology referral   -family history prostate cancer/difficulty urinating/BPH - recent PSA normal .recent US of prostate shows enlarged prostate . Start on flomax 0.4 m PO qhs   -SVT -on flecanide 50 mg PO BID.  Refer to [facility]Ardiology with [facility]   -HLP - on simvastatin 80 mg PO qhs recommend heart healthy diet   -HTN  - on micadix 40-12.5 mg PO q daily,  Coreg 6.25 mg PO BID,    -GERD - on prilosec 20 mg PO q daily  -vitamin D defiicency -vitamin D once a week   -advised pt to be safe and call with questions and concerns  -advised pt to go to ER or call 911 if symptoms worrisome or severe  -advised pt to followup with specialist and referrals  -advised pt to be safe during COVID-19 pandemic  I spent 30  minutes caring for Sergio on this date of service. This time includes time spent by me in the following activities: preparing for the visit, reviewing tests, obtaining and/or reviewing a separately obtained history, performing a medically appropriate examination and/or evaluation, counseling and educating the patient/family/caregiver, ordering medications, tests, or procedures, referring and communicating with other health care professionals, documenting information in the medical record, independently interpreting results and  communicating that information with the patient/family/caregiver and care coordination.   -recheck in 3 months         This document has been electronically signed by Oneil Jorgensen MD on June 28, 2021 14:45 CDT          Follow Up   Return in about 3 months (around 9/28/2021).  Patient was given instructions and counseling regarding his condition or for health maintenance advice. Please see specific information pulled into the AVS if appropriate.

## 2021-06-25 ENCOUNTER — TELEPHONE (OUTPATIENT)
Dept: FAMILY MEDICINE CLINIC | Facility: CLINIC | Age: 66
End: 2021-06-25

## 2021-06-28 ENCOUNTER — OFFICE VISIT (OUTPATIENT)
Dept: FAMILY MEDICINE CLINIC | Facility: CLINIC | Age: 66
End: 2021-06-28

## 2021-06-28 VITALS
HEIGHT: 70 IN | HEART RATE: 80 BPM | WEIGHT: 186 LBS | DIASTOLIC BLOOD PRESSURE: 90 MMHG | TEMPERATURE: 97.3 F | BODY MASS INDEX: 26.63 KG/M2 | OXYGEN SATURATION: 98 % | SYSTOLIC BLOOD PRESSURE: 124 MMHG

## 2021-06-28 DIAGNOSIS — E66.3 OVERWEIGHT: ICD-10-CM

## 2021-06-28 DIAGNOSIS — D69.6 THROMBOCYTOPENIA (HCC): ICD-10-CM

## 2021-06-28 DIAGNOSIS — Z86.79 HISTORY OF ATRIAL FIBRILLATION: ICD-10-CM

## 2021-06-28 DIAGNOSIS — L72.0 EPIDERMOID CYST: Primary | ICD-10-CM

## 2021-06-28 DIAGNOSIS — I10 ESSENTIAL HYPERTENSION: ICD-10-CM

## 2021-06-28 DIAGNOSIS — E78.2 MIXED HYPERLIPIDEMIA: ICD-10-CM

## 2021-06-28 DIAGNOSIS — I47.1 SVT (SUPRAVENTRICULAR TACHYCARDIA) (HCC): ICD-10-CM

## 2021-06-28 DIAGNOSIS — E55.9 VITAMIN D DEFICIENCY: ICD-10-CM

## 2021-06-28 DIAGNOSIS — K21.9 GASTROESOPHAGEAL REFLUX DISEASE, UNSPECIFIED WHETHER ESOPHAGITIS PRESENT: ICD-10-CM

## 2021-06-28 PROCEDURE — 99214 OFFICE O/P EST MOD 30 MIN: CPT | Performed by: FAMILY MEDICINE

## 2021-06-28 RX ORDER — TELMISARTAN AND HYDROCHLORTHIAZIDE 40; 12.5 MG/1; MG/1
1 TABLET ORAL DAILY
Qty: 30 TABLET | Refills: 1 | Status: SHIPPED | OUTPATIENT
Start: 2021-06-28 | End: 2022-02-22 | Stop reason: SDUPTHER

## 2021-06-28 RX ORDER — FLECAINIDE ACETATE 50 MG/1
50 TABLET ORAL 2 TIMES DAILY
Qty: 60 TABLET | Refills: 1 | Status: SHIPPED | OUTPATIENT
Start: 2021-06-28 | End: 2021-09-07

## 2021-06-28 RX ORDER — CARVEDILOL 6.25 MG/1
6.25 TABLET ORAL 2 TIMES DAILY WITH MEALS
Qty: 60 TABLET | Refills: 1 | Status: SHIPPED | OUTPATIENT
Start: 2021-06-28 | End: 2021-10-10 | Stop reason: SDUPTHER

## 2021-06-28 RX ORDER — OMEPRAZOLE 20 MG/1
20 CAPSULE, DELAYED RELEASE ORAL DAILY
Qty: 30 CAPSULE | Refills: 1 | Status: SHIPPED | OUTPATIENT
Start: 2021-06-28 | End: 2021-10-28

## 2021-06-28 RX ORDER — CELECOXIB 200 MG/1
200 CAPSULE ORAL DAILY
Qty: 30 CAPSULE | Refills: 1 | Status: SHIPPED | OUTPATIENT
Start: 2021-06-28 | End: 2021-09-29 | Stop reason: ALTCHOICE

## 2021-06-28 RX ORDER — TAMSULOSIN HYDROCHLORIDE 0.4 MG/1
1 CAPSULE ORAL DAILY
Qty: 30 CAPSULE | Refills: 3 | Status: SHIPPED | OUTPATIENT
Start: 2021-06-28 | End: 2021-09-30 | Stop reason: SDUPTHER

## 2021-06-28 RX ORDER — MAG HYDROX/ALUMINUM HYD/SIMETH 400-400-40
1 SUSPENSION, ORAL (FINAL DOSE FORM) ORAL 2 TIMES DAILY
COMMUNITY

## 2021-06-28 RX ORDER — ASPIRIN 81 MG/1
81 TABLET ORAL DAILY
Qty: 30 TABLET | Refills: 1 | Status: SHIPPED | OUTPATIENT
Start: 2021-06-28 | End: 2021-07-27

## 2021-06-28 RX ORDER — PHENOL 1.4 %
600 AEROSOL, SPRAY (ML) MUCOUS MEMBRANE DAILY
COMMUNITY
End: 2022-01-12 | Stop reason: ALTCHOICE

## 2021-06-28 RX ORDER — SIMVASTATIN 80 MG
80 TABLET ORAL NIGHTLY
Qty: 30 TABLET | Refills: 1 | Status: SHIPPED | OUTPATIENT
Start: 2021-06-28 | End: 2021-10-08 | Stop reason: DRUGHIGH

## 2021-06-30 ENCOUNTER — LAB (OUTPATIENT)
Dept: LAB | Facility: HOSPITAL | Age: 66
End: 2021-06-30

## 2021-06-30 DIAGNOSIS — D69.6 THROMBOCYTOPENIA (HCC): ICD-10-CM

## 2021-06-30 DIAGNOSIS — R73.03 PREDIABETES: ICD-10-CM

## 2021-06-30 DIAGNOSIS — E55.9 VITAMIN D DEFICIENCY: ICD-10-CM

## 2021-06-30 DIAGNOSIS — E78.2 MIXED HYPERLIPIDEMIA: ICD-10-CM

## 2021-06-30 LAB
25(OH)D3 SERPL-MCNC: 64 NG/ML
ALBUMIN SERPL-MCNC: 4.4 G/DL (ref 3.5–5.2)
ALBUMIN/GLOB SERPL: 1.8 G/DL
ALP SERPL-CCNC: 63 U/L (ref 39–117)
ALT SERPL W P-5'-P-CCNC: 18 U/L (ref 1–41)
ANION GAP SERPL CALCULATED.3IONS-SCNC: 10.8 MMOL/L (ref 5–15)
AST SERPL-CCNC: 24 U/L (ref 1–40)
BASOPHILS # BLD AUTO: 0.02 10*3/MM3 (ref 0–0.2)
BASOPHILS NFR BLD AUTO: 0.3 % (ref 0–1.5)
BILIRUB SERPL-MCNC: 0.4 MG/DL (ref 0–1.2)
BUN SERPL-MCNC: 17 MG/DL (ref 8–23)
BUN/CREAT SERPL: 12.2 (ref 7–25)
CALCIUM SPEC-SCNC: 9.3 MG/DL (ref 8.6–10.5)
CHLORIDE SERPL-SCNC: 101 MMOL/L (ref 98–107)
CHOLEST SERPL-MCNC: 138 MG/DL (ref 0–200)
CO2 SERPL-SCNC: 27.2 MMOL/L (ref 22–29)
CREAT SERPL-MCNC: 1.39 MG/DL (ref 0.76–1.27)
DEPRECATED RDW RBC AUTO: 38.2 FL (ref 37–54)
EOSINOPHIL # BLD AUTO: 0.06 10*3/MM3 (ref 0–0.4)
EOSINOPHIL NFR BLD AUTO: 0.9 % (ref 0.3–6.2)
ERYTHROCYTE [DISTWIDTH] IN BLOOD BY AUTOMATED COUNT: 11.8 % (ref 12.3–15.4)
GFR SERPL CREATININE-BSD FRML MDRD: 51 ML/MIN/1.73
GLOBULIN UR ELPH-MCNC: 2.5 GM/DL
GLUCOSE SERPL-MCNC: 108 MG/DL (ref 65–99)
HBA1C MFR BLD: 5.92 % (ref 4.8–5.6)
HCT VFR BLD AUTO: 44.6 % (ref 37.5–51)
HDLC SERPL-MCNC: 54 MG/DL (ref 40–60)
HGB BLD-MCNC: 15.2 G/DL (ref 13–17.7)
IMM GRANULOCYTES # BLD AUTO: 0.02 10*3/MM3 (ref 0–0.05)
IMM GRANULOCYTES NFR BLD AUTO: 0.3 % (ref 0–0.5)
LDLC SERPL CALC-MCNC: 73 MG/DL (ref 0–100)
LDLC/HDLC SERPL: 1.37 {RATIO}
LYMPHOCYTES # BLD AUTO: 1.3 10*3/MM3 (ref 0.7–3.1)
LYMPHOCYTES NFR BLD AUTO: 18.5 % (ref 19.6–45.3)
MCH RBC QN AUTO: 30.8 PG (ref 26.6–33)
MCHC RBC AUTO-ENTMCNC: 34.1 G/DL (ref 31.5–35.7)
MCV RBC AUTO: 90.3 FL (ref 79–97)
MONOCYTES # BLD AUTO: 0.56 10*3/MM3 (ref 0.1–0.9)
MONOCYTES NFR BLD AUTO: 8 % (ref 5–12)
NEUTROPHILS NFR BLD AUTO: 5.06 10*3/MM3 (ref 1.7–7)
NEUTROPHILS NFR BLD AUTO: 72 % (ref 42.7–76)
NRBC BLD AUTO-RTO: 0 /100 WBC (ref 0–0.2)
PLATELET # BLD AUTO: 161 10*3/MM3 (ref 140–450)
PMV BLD AUTO: 10.6 FL (ref 6–12)
POTASSIUM SERPL-SCNC: 4.3 MMOL/L (ref 3.5–5.2)
PROT SERPL-MCNC: 6.9 G/DL (ref 6–8.5)
RBC # BLD AUTO: 4.94 10*6/MM3 (ref 4.14–5.8)
SODIUM SERPL-SCNC: 139 MMOL/L (ref 136–145)
TRIGL SERPL-MCNC: 51 MG/DL (ref 0–150)
VLDLC SERPL-MCNC: 11 MG/DL (ref 5–40)
WBC # BLD AUTO: 7.02 10*3/MM3 (ref 3.4–10.8)

## 2021-06-30 PROCEDURE — 80061 LIPID PANEL: CPT

## 2021-06-30 PROCEDURE — 82306 VITAMIN D 25 HYDROXY: CPT

## 2021-06-30 PROCEDURE — 80053 COMPREHEN METABOLIC PANEL: CPT

## 2021-06-30 PROCEDURE — 85025 COMPLETE CBC W/AUTO DIFF WBC: CPT

## 2021-06-30 PROCEDURE — 83036 HEMOGLOBIN GLYCOSYLATED A1C: CPT

## 2021-07-01 ENCOUNTER — TELEPHONE (OUTPATIENT)
Dept: FAMILY MEDICINE CLINIC | Facility: CLINIC | Age: 66
End: 2021-07-01

## 2021-07-01 ENCOUNTER — OFFICE VISIT (OUTPATIENT)
Dept: SURGERY | Facility: CLINIC | Age: 66
End: 2021-07-01

## 2021-07-01 VITALS
WEIGHT: 188 LBS | OXYGEN SATURATION: 97 % | HEIGHT: 70 IN | HEART RATE: 87 BPM | DIASTOLIC BLOOD PRESSURE: 72 MMHG | SYSTOLIC BLOOD PRESSURE: 110 MMHG | TEMPERATURE: 96.9 F | BODY MASS INDEX: 26.92 KG/M2

## 2021-07-01 DIAGNOSIS — L72.3 SEBACEOUS CYST: Primary | ICD-10-CM

## 2021-07-01 DIAGNOSIS — N17.9 STAGE 2 ACUTE KIDNEY INJURY (HCC): Primary | ICD-10-CM

## 2021-07-01 PROCEDURE — 99203 OFFICE O/P NEW LOW 30 MIN: CPT | Performed by: SURGERY

## 2021-07-01 RX ORDER — FLUTICASONE PROPIONATE 50 MCG
2 SPRAY, SUSPENSION (ML) NASAL DAILY PRN
COMMUNITY

## 2021-07-01 NOTE — PATIENT INSTRUCTIONS
"BMI for Adults  What is BMI?  Body mass index (BMI) is a number that is calculated from a person's weight and height. BMI can help estimate how much of a person's weight is composed of fat. BMI does not measure body fat directly. Rather, it is an alternative to procedures that directly measure body fat, which can be difficult and expensive.  BMI can help identify people who may be at higher risk for certain medical problems.  What are BMI measurements used for?  BMI is used as a screening tool to identify possible weight problems. It helps determine whether a person is obese, overweight, a healthy weight, or underweight.  BMI is useful for:  · Identifying a weight problem that may be related to a medical condition or may increase the risk for medical problems.  · Promoting changes, such as changes in diet and exercise, to help reach a healthy weight. BMI screening can be repeated to see if these changes are working.  How is BMI calculated?  BMI involves measuring your weight in relation to your height. Both height and weight are measured, and the BMI is calculated from those numbers. This can be done either in English (U[place] or metric measurements. Note that charts and online BMI calculators are available to help you find your BMI quickly and easily without having to do these calculations yourself.  To calculate your BMI in English (U.[place] measurements:    1. Measure your weight in pounds (lb).  2. Multiply the number of pounds by 703.  ? For example, for a person who weighs 180 lb, multiply that number by 703, which equals 126,540.  3. Measure your height in inches. Then multiply that number by itself to get a measurement called \"inches squared.\"  ? For example, for a person who is 70 inches tall, the \"inches squared\" measurement is 70 inches x 70 inches, which equals 4,900 inches squared.  4. Divide the total from step 2 (number of lb x 703) by the total from step 3 (inches squared): 126,540 ÷ 4,900 = 25.8. This is " "your BMI.  To calculate your BMI in metric measurements:  1. Measure your weight in kilograms (kg).  2. Measure your height in meters (m). Then multiply that number by itself to get a measurement called \"meters squared.\"  ? For example, for a person who is 1.75 m tall, the \"meters squared\" measurement is 1.75 m x 1.75 m, which is equal to 3.1 meters squared.  3. Divide the number of kilograms (your weight) by the meters squared number. In this example: 70 ÷ 3.1 = 22.6. This is your BMI.  What do the results mean?  BMI charts are used to identify whether you are underweight, normal weight, overweight, or obese. The following guidelines will be used:  · Underweight: BMI less than 18.5.  · Normal weight: BMI between 18.5 and 24.9.  · Overweight: BMI between 25 and 29.9.  · Obese: BMI of 30 or above.  Keep these notes in mind:  · Weight includes both fat and muscle, so someone with a muscular build, such as an athlete, may have a BMI that is higher than 24.9. In cases like these, BMI is not an accurate measure of body fat.  · To determine if excess body fat is the cause of a BMI of 25 or higher, further assessments may need to be done by a health care provider.  · BMI is usually interpreted in the same way for men and women.  Where to find more information  For more information about BMI, including tools to quickly calculate your BMI, go to these websites:  · Centers for Disease Control and Prevention: www.cdc.gov  · American Heart Association: www.heart.org  · National Heart, Lung, and [employer] [place]: www.nhlbi.nih.gov  Summary  · Body mass index (BMI) is a number that is calculated from a person's weight and height.  · BMI may help estimate how much of a person's weight is composed of fat. BMI can help identify those who may be at higher risk for certain medical problems.  · BMI can be measured using English measurements or metric measurements.  · BMI charts are used to identify whether you are underweight, normal " weight, overweight, or obese.  This information is not intended to replace advice given to you by your health care provider. Make sure you discuss any questions you have with your health care provider.  Document Revised: 09/09/2020 Document Reviewed: 07/17/2020  Elsevier Patient Education © 2021 [employer]

## 2021-07-01 NOTE — PROGRESS NOTES
CHIEF COMPLAINT:    Cyst on back    HISTORY OF PRESENT ILLNESS:    Sergio Sandoval is a 66 y.o. male who presents today with complaints of an enlarging cyst on his mid back.  The area is intermittently painful due to pressure over the region.  He states that occasionally the area changes in size or becomes more acutely painful.  He denies any drainage.  He notes that he had a similar cyst on his back that did require drainage.  He also has had a pilonidal cyst in the past.    Past Medical History:   Diagnosis Date   • Atrial fibrillation (CMS/HCC)    • DDD (degenerative disc disease), thoracic    • Hyperlipidemia    • Hypertension    • Lipoma of skin        Past Surgical History:   Procedure Laterality Date   • CARDIAC ABLATION     • COLONOSCOPY     • SHOULDER SURGERY     • UPPER GASTROINTESTINAL ENDOSCOPY      with esophageal dilation   • WISDOM TOOTH EXTRACTION         Prior to Admission medications    Medication Sig Start Date End Date Taking? Authorizing Provider   Acetaminophen (TYLENOL 8 HOUR PO) Take 1,000 mg by mouth 3 (Three) Times a Day.   Yes Jorge Luis Thomas MD   calcium carbonate (OS-MAXI) 600 MG tablet Take 600 mg by mouth Daily.   Yes Jorge Luis Thomas MD   carvedilol (COREG) 6.25 MG tablet Take 1 tablet by mouth 2 (Two) Times a Day With Meals. 6/28/21  Yes Oneil Jorgensen MD   celecoxib (CeleBREX) 200 MG capsule Take 1 capsule by mouth Daily. 6/28/21  Yes Oneil Jorgensen MD   flecainide (TAMBOCOR) 50 MG tablet Take 1 tablet by mouth 2 (Two) Times a Day. 6/28/21  Yes Oneil Jorgensen MD   fluticasone (FLONASE) 50 MCG/ACT nasal spray 2 sprays into the nostril(s) as directed by provider Daily.   Yes Jorge Luis Thomas MD   Glucosamine-Chondroit-Vit C-Mn (GLUCOSAMINE 1500 COMPLEX PO) Take 1,500 mg by mouth 2 (two) times a day.   Yes Jorge Luis Thomas MD   Magnesium 100 MG tablet Take  by mouth.   Yes Jorge Luis Thomas MD   Multiple Vitamins-Minerals (MULTIVITAMIN WITH MINERALS) tablet  tablet Take 1 tablet by mouth Daily.   Yes Jorge Luis Thomas MD   omeprazole (priLOSEC) 20 MG capsule Take 1 capsule by mouth Daily. 6/28/21  Yes Oneil Jorgensen MD   Potassium 99 MG tablet Take  by mouth.   Yes Jorge Luis Thomas MD   Saw Palmetto 450 MG capsule Take  by mouth 2 (two) times a day.   Yes Jorge Luis Thomas MD   simvastatin (ZOCOR) 80 MG tablet Take 1 tablet by mouth Every Night. 6/28/21  Yes Oneil Jorgensen MD   tamsulosin (Flomax) 0.4 MG capsule 24 hr capsule Take 1 capsule by mouth Daily. 6/28/21  Yes Oneil Jorgensen MD   telmisartan-hydrochlorothiazide (MICARDIS HCT) 40-12.5 MG per tablet Take 1 tablet by mouth Daily. 6/28/21  Yes Oneil Jorgensen MD   VITAMIN D, ERGOCALCIFEROL, PO Take  by mouth.   Yes Jorge Luis Thomas MD   Zinc 50 MG capsule Take  by mouth.   Yes Jorge Luis Thomas MD   aspirin (aspirin) 81 MG EC tablet Take 1 tablet by mouth Daily. 6/28/21   Oneil Jorgensen MD       No Known Allergies    Family History   Problem Relation Age of Onset   • Arthritis Mother    • Heart disease Mother    • Rheumatic fever Mother    • Hyperlipidemia Father    • Hypertension Father    • Diabetes Father    • Stroke Father    • Prostate cancer Father    • Skin cancer Father    • Cancer Sister    • Alcohol abuse Brother    • No Known Problems Daughter    • No Known Problems Son    • Prostate cancer Paternal Grandfather    • No Known Problems Sister        Social History     Socioeconomic History   • Marital status:      Spouse name: Not on file   • Number of children: Not on file   • Years of education: Not on file   • Highest education level: Not on file   Tobacco Use   • Smoking status: Never Smoker   • Smokeless tobacco: Never Used   Vaping Use   • Vaping Use: Never used   Substance and Sexual Activity   • Alcohol use: Not Currently     Comment: none for 20 years   • Drug use: Never   • Sexual activity: Defer       Review of Systems   Constitutional: Negative for activity  "change, appetite change, chills and fever.   HENT: Negative for hearing loss, nosebleeds and trouble swallowing.    Cardiovascular: Negative for chest pain, palpitations and leg swelling.   Gastrointestinal: Negative for abdominal distention, abdominal pain, anal bleeding, blood in stool, constipation, diarrhea, nausea, rectal pain and vomiting.   Endocrine: Negative for cold intolerance, heat intolerance, polydipsia and polyuria.   Genitourinary: Negative for decreased urine volume, difficulty urinating, dysuria, enuresis, frequency, hematuria and urgency.   Musculoskeletal: Negative for arthralgias, back pain, gait problem, myalgias and neck pain.   Skin: Negative for pallor, rash and wound.   Allergic/Immunologic: Negative for immunocompromised state.   Neurological: Negative for dizziness, seizures, weakness, light-headedness, numbness and headaches.   Psychiatric/Behavioral: Negative for agitation and behavioral problems. The patient is not nervous/anxious.        Objective     /72 (BP Location: Left arm, Patient Position: Sitting, Cuff Size: Adult)   Pulse 87   Temp 96.9 °F (36.1 °C) (Temporal)   Ht 177.8 cm (70\")   Wt 85.3 kg (188 lb)   SpO2 97%   BMI 26.98 kg/m²     Physical Exam  Constitutional:       General: He is not in acute distress.     Appearance: Normal appearance. He is not ill-appearing, toxic-appearing or diaphoretic.   HENT:      Head: Normocephalic and atraumatic.   Eyes:      General: No scleral icterus.        Right eye: No discharge.         Left eye: No discharge.      Extraocular Movements: Extraocular movements intact.      Conjunctiva/sclera: Conjunctivae normal.   Pulmonary:      Effort: Pulmonary effort is normal. No respiratory distress.   Skin:     General: Skin is warm.          Neurological:      General: No focal deficit present.      Mental Status: He is alert and oriented to person, place, and time.   Psychiatric:         Mood and Affect: Mood normal.         " Behavior: Behavior normal.         Thought Content: Thought content normal.         Judgment: Judgment normal.         ASSESSMENT:    Sebaceous cyst on back    PLAN:    The patient has a large sebaceous cyst on his mid back, he also has a smaller residual cyst at the site of his prior incision and drainage.  He would like both areas removed.  I believe this is reasonable particular given that he has pain in these areas.  The risks and benefits of excision of sebaceous cyst from back were discussed.  He will return next week depending on his schedule to have these removed.          This document has been electronically signed by Gabriel Quiñonez MD on July 1, 2021 09:16 CDT

## 2021-07-01 NOTE — TELEPHONE ENCOUNTER
----- Message from Oneil Jorgensen MD sent at 7/1/2021  1:16 PM CDT -----  Please call pt    Vitamin D levels normal    On CMP his kidney function shows CR at 1.39 from 1.11. GFR at 51 from 67. May have acute kidney injury. Recommend more water intake. Will need to recheck  again on next visit. Recommend recheck in about 3-4 weeks if dionna. I will order renal function panel . Recommend pt limit his celebrex use and use tylenol instead. Also recommend pt keep BP under control     Lipid panel stable    Hga1c up from 5.7 to 5.92. needs to watch sugar and carb intake. Consider metformin if hga1c >6.00     CBC shows normal hemoglobin and WBC

## 2021-07-09 ENCOUNTER — PROCEDURE VISIT (OUTPATIENT)
Dept: SURGERY | Facility: CLINIC | Age: 66
End: 2021-07-09

## 2021-07-09 VITALS
OXYGEN SATURATION: 96 % | DIASTOLIC BLOOD PRESSURE: 82 MMHG | HEART RATE: 72 BPM | HEIGHT: 70 IN | SYSTOLIC BLOOD PRESSURE: 130 MMHG | BODY MASS INDEX: 26.98 KG/M2 | TEMPERATURE: 97.8 F

## 2021-07-09 DIAGNOSIS — L72.3 SEBACEOUS CYST: Primary | ICD-10-CM

## 2021-07-09 PROCEDURE — 12032 INTMD RPR S/A/T/EXT 2.6-7.5: CPT | Performed by: SURGERY

## 2021-07-09 PROCEDURE — 88304 TISSUE EXAM BY PATHOLOGIST: CPT

## 2021-07-09 PROCEDURE — 11402 EXC TR-EXT B9+MARG 1.1-2 CM: CPT | Performed by: SURGERY

## 2021-07-09 NOTE — PATIENT INSTRUCTIONS
"BMI for Adults  What is BMI?  Body mass index (BMI) is a number that is calculated from a person's weight and height. BMI can help estimate how much of a person's weight is composed of fat. BMI does not measure body fat directly. Rather, it is an alternative to procedures that directly measure body fat, which can be difficult and expensive.  BMI can help identify people who may be at higher risk for certain medical problems.  What are BMI measurements used for?  BMI is used as a screening tool to identify possible weight problems. It helps determine whether a person is obese, overweight, a healthy weight, or underweight.  BMI is useful for:  · Identifying a weight problem that may be related to a medical condition or may increase the risk for medical problems.  · Promoting changes, such as changes in diet and exercise, to help reach a healthy weight. BMI screening can be repeated to see if these changes are working.  How is BMI calculated?  BMI involves measuring your weight in relation to your height. Both height and weight are measured, and the BMI is calculated from those numbers. This can be done either in English ([place] or metric measurements. Note that charts and online BMI calculators are available to help you find your BMI quickly and easily without having to do these calculations yourself.  To calculate your BMI in English ([place] measurements:    1. Measure your weight in pounds (lb).  2. Multiply the number of pounds by 703.  ? For example, for a person who weighs 180 lb, multiply that number by 703, which equals 126,540.  3. Measure your height in inches. Then multiply that number by itself to get a measurement called \"inches squared.\"  ? For example, for a person who is 70 inches tall, the \"inches squared\" measurement is 70 inches x 70 inches, which equals 4,900 inches squared.  4. Divide the total from step 2 (number of lb x 703) by the total from step 3 (inches squared): 126,540 ÷ 4,900 = 25.8. This is " "your BMI.  To calculate your BMI in metric measurements:  1. Measure your weight in kilograms (kg).  2. Measure your height in meters (m). Then multiply that number by itself to get a measurement called \"meters squared.\"  ? For example, for a person who is 1.75 m tall, the \"meters squared\" measurement is 1.75 m x 1.75 m, which is equal to 3.1 meters squared.  3. Divide the number of kilograms (your weight) by the meters squared number. In this example: 70 ÷ 3.1 = 22.6. This is your BMI.  What do the results mean?  BMI charts are used to identify whether you are underweight, normal weight, overweight, or obese. The following guidelines will be used:  · Underweight: BMI less than 18.5.  · Normal weight: BMI between 18.5 and 24.9.  · Overweight: BMI between 25 and 29.9.  · Obese: BMI of 30 or above.  Keep these notes in mind:  · Weight includes both fat and muscle, so someone with a muscular build, such as an athlete, may have a BMI that is higher than 24.9. In cases like these, BMI is not an accurate measure of body fat.  · To determine if excess body fat is the cause of a BMI of 25 or higher, further assessments may need to be done by a health care provider.  · BMI is usually interpreted in the same way for men and women.  Where to find more information  For more information about BMI, including tools to quickly calculate your BMI, go to these websites:  · Centers for Disease Control and Prevention: www.cdc.gov  · American Heart Association: www.heart.org  · National Heart, Lung, and Blood [place]: www.nhlbi.nih.gov  Summary  · Body mass index (BMI) is a number that is calculated from a person's weight and height.  · BMI may help estimate how much of a person's weight is composed of fat. BMI can help identify those who may be at higher risk for certain medical problems.  · BMI can be measured using English measurements or metric measurements.  · BMI charts are used to identify whether you are underweight, normal " weight, overweight, or obese.  This information is not intended to replace advice given to you by your health care provider. Make sure you discuss any questions you have with your health care provider.  Document Revised: 09/09/2020 Document Reviewed: 07/17/2020  Elsevier Patient Education © 2021 [employer]

## 2021-07-14 LAB
LAB AP CASE REPORT: NORMAL
PATH REPORT.FINAL DX SPEC: NORMAL

## 2021-07-15 NOTE — PROGRESS NOTES
66-year-old gentleman previously seen for a symptomatic enlarging sebaceous cyst on his mid back.  He was noted to have an adjacent scar from a prior cyst incision and drainage at a nearby site with recurrence of the cyst as well.  He is here today for removal of the cysts.  Consent was obtained prior to the procedure.    Procedure     Procedure Performed: Removal of two 1.5cm cyst from the mid back  Preop Dx: Symptomatic, enlarging sebaceous cyst on the back  Postop Dx: Same  Surgeon: Olamide  Assistant: Karen Manzano  EBL: <5cc  Specimen: Sebaceous cyst from back    Note: Site were identified by the patient.  Consent was obtained.  Timeout was performed.  The area was prepped and draped in normal sterile fashion.  The larger of the 2 cysts on the patient's left side was addressed first.  An elliptical incision was outlined surrounding the cyst.  Local anesthetic was injected in a field block.  Skin incision was then made and sharp dissection was then used to free the cyst from the surrounding subcutaneous tissues.  The cyst and overlying skin were then passed off the field as specimen.  The wound was irrigated.  Hemostasis was obtained with electrocautery.  The wound was then closed in layers using 3-0 Vicryl and 3-0 nylon.    Attention was then turned to the recurrent cyst adjacent to her prior scar.  The area was reprepped and draped.  Local anesthetic was then injected surrounding the site.  An elliptical incision including the patient's prior scar was then made and sharp dissection was then used to remove the cyst from the surrounding subcutaneous fat.  The skin and underlying cyst were then passed off the field as specimen.  The wound was irrigated.  Hemostasis was obtained with electrocautery.  The skin was then closed in layers using 3-0 Vicryl and 3-0 nylon.    Sterile dressings were applied over both sites.  Appropriate wound care was discussed with the patient.  He will follow-up in approximately 10  days for suture removal with our nurse practitioner.          This document has been electronically signed by Gabriel Quiñonez MD on July 15, 2021 12:44 CDT

## 2021-07-21 ENCOUNTER — OFFICE VISIT (OUTPATIENT)
Dept: SURGERY | Facility: CLINIC | Age: 66
End: 2021-07-21

## 2021-07-21 VITALS
HEIGHT: 70 IN | SYSTOLIC BLOOD PRESSURE: 110 MMHG | WEIGHT: 187.2 LBS | HEART RATE: 84 BPM | DIASTOLIC BLOOD PRESSURE: 80 MMHG | BODY MASS INDEX: 26.8 KG/M2 | TEMPERATURE: 97.6 F

## 2021-07-21 DIAGNOSIS — L72.3 SEBACEOUS CYST: Primary | ICD-10-CM

## 2021-07-21 PROCEDURE — 99024 POSTOP FOLLOW-UP VISIT: CPT | Performed by: NURSE PRACTITIONER

## 2021-07-21 NOTE — PATIENT INSTRUCTIONS
"BMI for Adults  What is BMI?  Body mass index (BMI) is a number that is calculated from a person's weight and height. BMI can help estimate how much of a person's weight is composed of fat. BMI does not measure body fat directly. Rather, it is an alternative to procedures that directly measure body fat, which can be difficult and expensive.  BMI can help identify people who may be at higher risk for certain medical problems.  What are BMI measurements used for?  BMI is used as a screening tool to identify possible weight problems. It helps determine whether a person is obese, overweight, a healthy weight, or underweight.  BMI is useful for:  · Identifying a weight problem that may be related to a medical condition or may increase the risk for medical problems.  · Promoting changes, such as changes in diet and exercise, to help reach a healthy weight. BMI screening can be repeated to see if these changes are working.  How is BMI calculated?  BMI involves measuring your weight in relation to your height. Both height and weight are measured, and the BMI is calculated from those numbers. This can be done either in English (U[place] or metric measurements. Note that charts and online BMI calculators are available to help you find your BMI quickly and easily without having to do these calculations yourself.  To calculate your BMI in English ([place] measurements:    1. Measure your weight in pounds (lb).  2. Multiply the number of pounds by 703.  ? For example, for a person who weighs 180 lb, multiply that number by 703, which equals 126,540.  3. Measure your height in inches. Then multiply that number by itself to get a measurement called \"inches squared.\"  ? For example, for a person who is 70 inches tall, the \"inches squared\" measurement is 70 inches x 70 inches, which equals 4,900 inches squared.  4. Divide the total from step 2 (number of lb x 703) by the total from step 3 (inches squared): 126,540 ÷ 4,900 = 25.8. This is " "your BMI.  To calculate your BMI in metric measurements:  1. Measure your weight in kilograms (kg).  2. Measure your height in meters (m). Then multiply that number by itself to get a measurement called \"meters squared.\"  ? For example, for a person who is 1.75 m tall, the \"meters squared\" measurement is 1.75 m x 1.75 m, which is equal to 3.1 meters squared.  3. Divide the number of kilograms (your weight) by the meters squared number. In this example: 70 ÷ 3.1 = 22.6. This is your BMI.  What do the results mean?  BMI charts are used to identify whether you are underweight, normal weight, overweight, or obese. The following guidelines will be used:  · Underweight: BMI less than 18.5.  · Normal weight: BMI between 18.5 and 24.9.  · Overweight: BMI between 25 and 29.9.  · Obese: BMI of 30 or above.  Keep these notes in mind:  · Weight includes both fat and muscle, so someone with a muscular build, such as an athlete, may have a BMI that is higher than 24.9. In cases like these, BMI is not an accurate measure of body fat.  · To determine if excess body fat is the cause of a BMI of 25 or higher, further assessments may need to be done by a health care provider.  · BMI is usually interpreted in the same way for men and women.  Where to find more information  For more information about BMI, including tools to quickly calculate your BMI, go to these websites:  · Centers for Disease Control and Prevention: www.cdc.gov  · American Heart Association: www.heart.org  · National Heart, Lung, and Blood [place]: www.nhlbi.nih.gov  Summary  · Body mass index (BMI) is a number that is calculated from a person's weight and height.  · BMI may help estimate how much of a person's weight is composed of fat. BMI can help identify those who may be at higher risk for certain medical problems.  · BMI can be measured using English measurements or metric measurements.  · BMI charts are used to identify whether you are underweight, normal " weight, overweight, or obese.  This information is not intended to replace advice given to you by your health care provider. Make sure you discuss any questions you have with your health care provider.  Document Revised: 09/09/2020 Document Reviewed: 07/17/2020  Elsevier Patient Education © 2021 [employer]    Calorie Counting for Weight Loss  Calories are units of energy. Your body needs a certain number of calories from food to keep going throughout the day. When you eat or drink more calories than your body needs, your body stores the extra calories mostly as fat. When you eat or drink fewer calories than your body needs, your body burns fat to get the energy it needs.  Calorie counting means keeping track of how many calories you eat and drink each day. Calorie counting can be helpful if you need to lose weight. If you eat fewer calories than your body needs, you should lose weight. Ask your health care provider what a healthy weight is for you.  For calorie counting to work, you will need to eat the right number of calories each day to lose a healthy amount of weight per week. A dietitian can help you figure out how many calories you need in a day and will suggest ways to reach your calorie goal.  · A healthy amount of weight to lose each week is usually 1-2 lb (0.5-0.9 kg). This usually means that your daily calorie intake should be reduced by 500-750 calories.  · Eating 1,200-1,500 calories a day can help most women lose weight.  · Eating 1,500-1,800 calories a day can help most men lose weight.  What do I need to know about calorie counting?  Work with your health care provider or dietitian to determine how many calories you should get each day. To meet your daily calorie goal, you will need to:  · Find out how many calories are in each food that you would like to eat. Try to do this before you eat.  · Decide how much of the food you plan to eat.  · Keep a food log. Do this by writing down what you ate and  how many calories it had.  To successfully lose weight, it is important to balance calorie counting with a healthy lifestyle that includes regular activity.  Where do I find calorie information?    The number of calories in a food can be found on a Nutrition Facts label. If a food does not have a Nutrition Facts label, try to look up the calories online or ask your dietitian for help.  Remember that calories are listed per serving. If you choose to have more than one serving of a food, you will have to multiply the calories per serving by the number of servings you plan to eat. For example, the label on a package of bread might say that a serving size is 1 slice and that there are 90 calories in a serving. If you eat 1 slice, you will have eaten 90 calories. If you eat 2 slices, you will have eaten 180 calories.  How do I keep a food log?  After each time that you eat, record the following in your food log as soon as possible:  · What you ate. Be sure to include toppings, sauces, and other extras on the food.  · How much you ate. This can be measured in cups, ounces, or number of items.  · How many calories were in each food and drink.  · The total number of calories in the food you ate.  Keep your food log near you, such as in a pocket-sized notebook or on an dominga or website on your mobile phone. Some programs will calculate calories for you and show you how many calories you have left to meet your daily goal.  What are some portion-control tips?  · Know how many calories are in a serving. This will help you know how many servings you can have of a certain food.  · Use a measuring cup to measure serving sizes. You could also try weighing out portions on a kitchen scale. With time, you will be able to estimate serving sizes for some foods.  · Take time to put servings of different foods on your favorite plates or in your favorite bowls and cups so you know what a serving looks like.  · Try not to eat straight from a  food's packaging, such as from a bag or box. Eating straight from the package makes it hard to see how much you are eating and can lead to overeating. Put the amount you would like to eat in a cup or on a plate to make sure you are eating the right portion.  · Use smaller plates, glasses, and bowls for smaller portions and to prevent overeating.  · Try not to multitask. For example, avoid watching TV or using your computer while eating. If it is time to eat, sit down at a table and enjoy your food. This will help you recognize when you are full. It will also help you be more mindful of what and how much you are eating.  What are tips for following this plan?  Reading food labels  · Check the calorie count compared with the serving size. The serving size may be smaller than what you are used to eating.  · Check the source of the calories. Try to choose foods that are high in protein, fiber, and vitamins, and low in saturated fat, trans fat, and sodium.  Shopping  · Read nutrition labels while you shop. This will help you make healthy decisions about which foods to buy.  · Pay attention to nutrition labels for low-fat or fat-free foods. These foods sometimes have the same number of calories or more calories than the full-fat versions. They also often have added sugar, starch, or salt to make up for flavor that was removed with the fat.  · Make a grocery list of lower-calorie foods and stick to it.  Cooking  · Try to cook your favorite foods in a healthier way. For example, try baking instead of frying.  · Use low-fat dairy products.  Meal planning  · Use more fruits and vegetables. One-half of your plate should be fruits and vegetables.  · Include lean proteins, such as chicken, turkey, and fish.  Lifestyle  Each week, aim to do one of the following:  · 150 minutes of moderate exercise, such as walking.  · 75 minutes of vigorous exercise, such as running.  General information  · Know how many calories are in the foods  you eat most often. This will help you calculate calorie counts faster.  · Find a way of tracking calories that works for you. Get creative. Try different apps or programs if writing down calories does not work for you.  What foods should I eat?    · Eat nutritious foods. It is better to have a nutritious, high-calorie food, such as an avocado, than a food with few nutrients, such as a bag of potato chips.  · Use your calories on foods and drinks that will fill you up and will not leave you hungry soon after eating.  ? Examples of foods that fill you up are nuts and nut butters, vegetables, lean proteins, and high-fiber foods such as whole grains. High-fiber foods are foods with more than 5 g of fiber per serving.  · Pay attention to calories in drinks. Low-calorie drinks include water and unsweetened drinks.  The items listed above may not be a complete list of foods and beverages you can eat. Contact a dietitian for more information.  What foods should I limit?  Limit foods or drinks that are not good sources of vitamins, minerals, or protein or that are high in unhealthy fats. These include:  · Candy.  · Other sweets.  · Sodas, specialty coffee drinks, alcohol, and juice.  The items listed above may not be a complete list of foods and beverages you should avoid. Contact a dietitian for more information.  How do I count calories when eating out?  · Pay attention to portions. Often, portions are much larger when eating out. Try these tips to keep portions smaller:  ? Consider sharing a meal instead of getting your own.  ? If you get your own meal, eat only half of it. Before you start eating, ask for a container and put half of your meal into it.  ? When available, consider ordering smaller portions from the menu instead of full portions.  · Pay attention to your food and drink choices. Knowing the way food is cooked and what is included with the meal can help you eat fewer calories.  ? If calories are listed on  the menu, choose the lower-calorie options.  ? Choose dishes that include vegetables, fruits, whole grains, low-fat dairy products, and lean proteins.  ? Choose items that are boiled, broiled, grilled, or steamed. Avoid items that are buttered, battered, fried, or served with cream sauce. Items labeled as crispy are usually fried, unless stated otherwise.  ? Choose water, low-fat milk, unsweetened iced tea, or other drinks without added sugar. If you want an alcoholic beverage, choose a lower-calorie option, such as a glass of wine or light beer.  ? Ask for dressings, sauces, and syrups on the side. These are usually high in calories, so you should limit the amount you eat.  ? If you want a salad, choose a garden salad and ask for grilled meats. Avoid extra toppings such as bergeron, cheese, or fried items. Ask for the dressing on the side, or ask for olive oil and vinegar or lemon to use as dressing.  · Estimate how many servings of a food you are given. Knowing serving sizes will help you be aware of how much food you are eating at restaurants.  Where to find more information  · Centers for Disease Control and Prevention: www.cdc.gov  · [place]. Department of Agriculture: myplate.gov  Summary  · Calorie counting means keeping track of how many calories you eat and drink each day. If you eat fewer calories than your body needs, you should lose weight.  · A healthy amount of weight to lose per week is usually 1-2 lb (0.5-0.9 kg). This usually means reducing your daily calorie intake by 500-750 calories.  · The number of calories in a food can be found on a Nutrition Facts label. If a food does not have a Nutrition Facts label, try to look up the calories online or ask your dietitian for help.  · Use smaller plates, glasses, and bowls for smaller portions and to prevent overeating.  · Use your calories on foods and drinks that will fill you up and not leave you hungry shortly after a meal.  This information is not intended  to replace advice given to you by your health care provider. Make sure you discuss any questions you have with your health care provider.  Document Revised: 01/28/2021 Document Reviewed: 01/28/2021  Elsevier Patient Education © 2021 [employer] Inc.

## 2021-07-21 NOTE — PROGRESS NOTES
CHIEF COMPLAINT:   Chief Complaint   Patient presents with   • Follow-up      Removal of cysts from back       HPI: This patient presents for a post-operative visit after undergoing excision of skin lesions from back per Dr. Quiñonez. Doing well- no cellulitis, wound separation, or significant pain.     PATHOLOGY:         Physical Exam  Vitals reviewed.   Constitutional:       General: He is not in acute distress.     Appearance: Normal appearance. He is not ill-appearing, toxic-appearing or diaphoretic.   Skin:         Neurological:      Mental Status: He is alert.   Psychiatric:         Mood and Affect: Mood normal.         Behavior: Behavior normal.         Thought Content: Thought content normal.         Judgment: Judgment normal.       Patient's Body mass index is 26.86 kg/m². indicating that he is overweight (BMI 25-29.9). Obesity-related health conditions include the following: hypertension and dyslipidemias. Obesity is newly identified. BMI is is above average; BMI management plan is completed. Information added to AVS.    ASSESSMENT:    Diagnoses and all orders for this visit:    1. Sebaceous cyst (Primary)        PLAN:  1. The patient will follow-up as needed  2. May shower.   3. May return to normal activity without restrictions.                    This document has been electronically signed by ERIN Cisse on July 21, 2021 16:21 CDT

## 2021-07-26 ENCOUNTER — TELEPHONE (OUTPATIENT)
Dept: SURGERY | Facility: CLINIC | Age: 66
End: 2021-07-26

## 2021-07-26 NOTE — TELEPHONE ENCOUNTER
TERRE[facility] ANGEL[facility] SAW PT LAST WED AND REMOVED SUTURES FROM HIS BACK.      ON Friday, PT HAD TO GO TO [facility]  IN [place] BECAUSE WOUND HAD OPENED.     THEY GAVE HIM SULPHA DRUGS AND AN ANTIBIOTIC OINTMENT.     HE IS ASKING IF HE NEEDS TO FOLLOW UP WITH YOU OR HIS GP ?       PLEASE CALL PT # [phone]

## 2021-07-27 ENCOUNTER — OFFICE VISIT (OUTPATIENT)
Dept: SURGERY | Facility: CLINIC | Age: 66
End: 2021-07-27

## 2021-07-27 VITALS
TEMPERATURE: 97.2 F | WEIGHT: 186.8 LBS | HEIGHT: 70 IN | HEART RATE: 77 BPM | BODY MASS INDEX: 26.74 KG/M2 | DIASTOLIC BLOOD PRESSURE: 70 MMHG | SYSTOLIC BLOOD PRESSURE: 118 MMHG

## 2021-07-27 DIAGNOSIS — Z09 FOLLOW UP: Primary | ICD-10-CM

## 2021-07-27 PROCEDURE — 99024 POSTOP FOLLOW-UP VISIT: CPT | Performed by: SURGERY

## 2021-07-28 NOTE — PROGRESS NOTES
CHIEF COMPLAINT:    Chief Complaint   Patient presents with   • Follow-up     Recheck Incision on back       HISTORY OF PRESENT ILLNESS:    Sergio Sandoval is a 66 y.o. male who underwent excision of 2 sebaceous cyst on his back previously.  His sutures were removed last week by the nurse practitioner.  Unfortunately, he did have skin dehiscence and drainage from the right sided incision site.  He notes no pain in the area.  He was seen elsewhere and was started on antibiotics for this.    EXAM:  Vitals:    07/27/21 1510   BP: 118/70   Pulse: 77   Temp: 97.2 °F (36.2 °C)         Well-healed left sided mid back incision site    Open skin wound at site of excision on the right mid back with healthy-appearing tissue present, no erythema, no purulence.  Clean bandage was placed over the site today    ASSESSMENT:    Skin dehiscence at site of sebaceous cyst removal on back    PLAN:    Appropriate care of the wound was discussed with the patient.  We will see him back next week to assess continued healing of the site.          This document has been electronically signed by Gabriel Quiñonez MD on July 28, 2021 14:47 CDT

## 2021-08-03 ENCOUNTER — OFFICE VISIT (OUTPATIENT)
Dept: SURGERY | Facility: CLINIC | Age: 66
End: 2021-08-03

## 2021-08-03 VITALS
OXYGEN SATURATION: 95 % | HEART RATE: 69 BPM | SYSTOLIC BLOOD PRESSURE: 102 MMHG | WEIGHT: 183.6 LBS | BODY MASS INDEX: 26.28 KG/M2 | HEIGHT: 70 IN | DIASTOLIC BLOOD PRESSURE: 72 MMHG | TEMPERATURE: 97.6 F

## 2021-08-03 DIAGNOSIS — Z09 FOLLOW UP: Primary | ICD-10-CM

## 2021-08-03 PROCEDURE — 99024 POSTOP FOLLOW-UP VISIT: CPT | Performed by: SURGERY

## 2021-08-03 NOTE — PATIENT INSTRUCTIONS
"BMI for Adults  What is BMI?  Body mass index (BMI) is a number that is calculated from a person's weight and height. BMI can help estimate how much of a person's weight is composed of fat. BMI does not measure body fat directly. Rather, it is an alternative to procedures that directly measure body fat, which can be difficult and expensive.  BMI can help identify people who may be at higher risk for certain medical problems.  What are BMI measurements used for?  BMI is used as a screening tool to identify possible weight problems. It helps determine whether a person is obese, overweight, a healthy weight, or underweight.  BMI is useful for:  · Identifying a weight problem that may be related to a medical condition or may increase the risk for medical problems.  · Promoting changes, such as changes in diet and exercise, to help reach a healthy weight. BMI screening can be repeated to see if these changes are working.  How is BMI calculated?  BMI involves measuring your weight in relation to your height. Both height and weight are measured, and the BMI is calculated from those numbers. This can be done either in English ([place] or metric measurements. Note that charts and online BMI calculators are available to help you find your BMI quickly and easily without having to do these calculations yourself.  To calculate your BMI in English ([place] measurements:    1. Measure your weight in pounds (lb).  2. Multiply the number of pounds by 703.  ? For example, for a person who weighs 180 lb, multiply that number by 703, which equals 126,540.  3. Measure your height in inches. Then multiply that number by itself to get a measurement called \"inches squared.\"  ? For example, for a person who is 70 inches tall, the \"inches squared\" measurement is 70 inches x 70 inches, which equals 4,900 inches squared.  4. Divide the total from step 2 (number of lb x 703) by the total from step 3 (inches squared): 126,540 ÷ 4,900 = 25.8. This is " "your BMI.  To calculate your BMI in metric measurements:  1. Measure your weight in kilograms (kg).  2. Measure your height in meters (m). Then multiply that number by itself to get a measurement called \"meters squared.\"  ? For example, for a person who is 1.75 m tall, the \"meters squared\" measurement is 1.75 m x 1.75 m, which is equal to 3.1 meters squared.  3. Divide the number of kilograms (your weight) by the meters squared number. In this example: 70 ÷ 3.1 = 22.6. This is your BMI.  What do the results mean?  BMI charts are used to identify whether you are underweight, normal weight, overweight, or obese. The following guidelines will be used:  · Underweight: BMI less than 18.5.  · Normal weight: BMI between 18.5 and 24.9.  · Overweight: BMI between 25 and 29.9.  · Obese: BMI of 30 or above.  Keep these notes in mind:  · Weight includes both fat and muscle, so someone with a muscular build, such as an athlete, may have a BMI that is higher than 24.9. In cases like these, BMI is not an accurate measure of body fat.  · To determine if excess body fat is the cause of a BMI of 25 or higher, further assessments may need to be done by a health care provider.  · BMI is usually interpreted in the same way for men and women.  Where to find more information  For more information about BMI, including tools to quickly calculate your BMI, go to these websites:  · Centers for Disease Control and Prevention: www.cdc.gov  · American Heart Association: www.heart.org  · National Heart, Lung, and [employer] [place]: www.nhlbi.nih.gov  Summary  · Body mass index (BMI) is a number that is calculated from a person's weight and height.  · BMI may help estimate how much of a person's weight is composed of fat. BMI can help identify those who may be at higher risk for certain medical problems.  · BMI can be measured using English measurements or metric measurements.  · BMI charts are used to identify whether you are underweight, normal " weight, overweight, or obese.  This information is not intended to replace advice given to you by your health care provider. Make sure you discuss any questions you have with your health care provider.  Document Revised: 09/09/2020 Document Reviewed: 07/17/2020  Elsevier Patient Education © 2021 [employer]

## 2021-08-03 NOTE — PROGRESS NOTES
CHIEF COMPLAINT:    Chief Complaint   Patient presents with   • Follow-up     7/9 removal of cysts on back       HISTORY OF PRESENT ILLNESS:    Sergio Sandoval is a 66 y.o. male who underwent removal of cyst from his back previously.  He returns today for additional follow-up after skin dehiscence at one of the sites.  He reports no pain or drainage in the area.  He has been keeping the area clean with soap and water.    EXAM:  Vitals:    08/03/21 0858   BP: 102/72   Pulse: 69   Temp: 97.6 °F (36.4 °C)   SpO2: 95%         1 well-healed incision on the back, wound healing site of skin dehiscence on the back.  Clean Band-Aid placed today    ASSESSMENT:    Status post excision of cysts on the back    PLAN:    Overall appears to be healing appropriately at this point.  Continue local wound care to the site.  See me as needed.          This document has been electronically signed by Gabriel Quiñonez MD on August 3, 2021 09:30 CDT

## 2021-08-31 ENCOUNTER — TELEPHONE (OUTPATIENT)
Dept: FAMILY MEDICINE CLINIC | Facility: CLINIC | Age: 66
End: 2021-08-31

## 2021-08-31 NOTE — TELEPHONE ENCOUNTER
Called patient to reschedule appointment with provider. Left detailed voicemail and told patient to please call our office

## 2021-09-07 ENCOUNTER — OFFICE VISIT (OUTPATIENT)
Dept: CARDIOLOGY | Facility: CLINIC | Age: 66
End: 2021-09-07

## 2021-09-07 VITALS
OXYGEN SATURATION: 99 % | WEIGHT: 189 LBS | SYSTOLIC BLOOD PRESSURE: 120 MMHG | BODY MASS INDEX: 27.06 KG/M2 | TEMPERATURE: 96.1 F | HEART RATE: 67 BPM | HEIGHT: 70 IN | DIASTOLIC BLOOD PRESSURE: 72 MMHG

## 2021-09-07 DIAGNOSIS — I48.91 ATRIAL FIBRILLATION, CONTROLLED (HCC): ICD-10-CM

## 2021-09-07 DIAGNOSIS — R06.09 DYSPNEA ON EXERTION: ICD-10-CM

## 2021-09-07 DIAGNOSIS — I10 ESSENTIAL HYPERTENSION: Primary | ICD-10-CM

## 2021-09-07 DIAGNOSIS — E78.2 MIXED HYPERLIPIDEMIA: ICD-10-CM

## 2021-09-07 PROCEDURE — 99204 OFFICE O/P NEW MOD 45 MIN: CPT | Performed by: INTERNAL MEDICINE

## 2021-09-07 PROCEDURE — 93000 ELECTROCARDIOGRAM COMPLETE: CPT | Performed by: INTERNAL MEDICINE

## 2021-09-07 NOTE — PROGRESS NOTES
Sergio Sandoval  66 y.o. male    09/07/2021  1. Essential hypertension    2. Atrial fibrillation, controlled (CMS/HCC)    3. Mixed hyperlipidemia    4. Dyspnea on exertion        History of Present Illness  Sergio Sandoval is a 66-year-old male who is being seen by me for the first time.  He was referred by  for evaluation of his cardiac status.  He has a long and complex cardiac history.  It appears that he was diagnosed to have supraventricular tachycardia when he was stationed in [place] in the late 1990s.  He was treated with beta-blockers and later in 1998 while in [place] appears to have undergone ablation for either SVT or atrial fibrillation.  Subsequently he was noted to have atrial fibrillation and underwent cryoablation in 2012.  An MRI report from 2012 was available for review and the results are as follows:     1. Atrial fibrillation, status post pulmonary vein isolation.      2. Normal left ventricular chamber size and systolic function. LVEF      56%.      3. Normal right ventricular chamber size and systolic function.      4. No significant valvular abnormalities.      5. Normal pulmonary vein anatomy; left superior 14 mm, left inferior 15      mm, right superior 8 mm, right inferior 21 mm (including small      accessory vein).      It appears that he has not followed up with cardiology since then and has been maintained on both antiarrhythmic therapy with flecainide and carvedilol.  He has been on aspirin but not on anticoagulation.  He indicated that he will not take Coumadin.  His other medical issues include back pain, degenerative joint disease, hypertension, borderline diabetes/prediabetes, depression.  He has no previous documented coronary artery disease or valvular heart disease.    EKG today showed atrial fibrillation with heart rate of 67 bpm poor R wave progression V1 V2.  Nonspecific ST-T changes.    Clinical exam today showed normal heart rate and blood pressure and no signs of  congestive heart failure was noted.    No Known Allergies      Past Medical History:   Diagnosis Date   • Atrial fibrillation (CMS/HCC)    • DDD (degenerative disc disease), thoracic    • Hyperlipidemia    • Hypertension    • Lipoma of skin          Past Surgical History:   Procedure Laterality Date   • CARDIAC ABLATION     • COLONOSCOPY     • SHOULDER SURGERY     • UPPER GASTROINTESTINAL ENDOSCOPY      with esophageal dilation   • WISDOM TOOTH EXTRACTION           Family History   Problem Relation Age of Onset   • Arthritis Mother    • Heart disease Mother    • Rheumatic fever Mother    • Hyperlipidemia Father    • Hypertension Father    • Diabetes Father    • Stroke Father    • Prostate cancer Father    • Skin cancer Father    • Cancer Sister    • Alcohol abuse Brother    • No Known Problems Daughter    • No Known Problems Son    • Prostate cancer Paternal Grandfather    • No Known Problems Sister          Social History     Socioeconomic History   • Marital status:      Spouse name: Not on file   • Number of children: Not on file   • Years of education: Not on file   • Highest education level: Not on file   Tobacco Use   • Smoking status: Never Smoker   • Smokeless tobacco: Never Used   Vaping Use   • Vaping Use: Never used   Substance and Sexual Activity   • Alcohol use: Not Currently     Comment: none for 20 years   • Drug use: Never   • Sexual activity: Defer         Current Outpatient Medications   Medication Sig Dispense Refill   • Acetaminophen (TYLENOL 8 HOUR PO) Take 1,000 mg by mouth 3 (Three) Times a Day.     • calcium carbonate (OS-MAXI) 600 MG tablet Take 600 mg by mouth Daily.     • carvedilol (COREG) 6.25 MG tablet Take 1 tablet by mouth 2 (Two) Times a Day With Meals. 60 tablet 1   • celecoxib (CeleBREX) 200 MG capsule Take 1 capsule by mouth Daily. 30 capsule 1   • fluticasone (FLONASE) 50 MCG/ACT nasal spray 2 sprays into the nostril(s) as directed by provider Daily.     •  "Glucosamine-Chondroit-Vit C-Mn (GLUCOSAMINE 1500 COMPLEX PO) Take 1,500 mg by mouth 2 (two) times a day.     • Magnesium 100 MG tablet Take  by mouth.     • Multiple Vitamins-Minerals (MULTIVITAMIN WITH MINERALS) tablet tablet Take 1 tablet by mouth Daily.     • omeprazole (priLOSEC) 20 MG capsule Take 1 capsule by mouth Daily. 30 capsule 1   • Potassium 99 MG tablet Take  by mouth.     • Saw Palmetto 450 MG capsule Take  by mouth 2 (two) times a day.     • simvastatin (ZOCOR) 80 MG tablet Take 1 tablet by mouth Every Night. 30 tablet 1   • tamsulosin (Flomax) 0.4 MG capsule 24 hr capsule Take 1 capsule by mouth Daily. 30 capsule 3   • telmisartan-hydrochlorothiazide (MICARDIS HCT) 40-12.5 MG per tablet Take 1 tablet by mouth Daily. 30 tablet 1   • VITAMIN D, ERGOCALCIFEROL, PO Take  by mouth.     • Zinc 50 MG capsule Take  by mouth.     • apixaban (ELIQUIS) 5 MG tablet tablet Take 1 tablet by mouth Every 12 (Twelve) Hours. 180 tablet 3     No current facility-administered medications for this visit.         OBJECTIVE    /72 (BP Location: Left arm, Patient Position: Sitting, Cuff Size: Adult)   Pulse 67   Temp 96.1 °F (35.6 °C)   Ht 177.8 cm (70\")   Wt 85.7 kg (189 lb)   SpO2 99%   BMI 27.12 kg/m²         Review of Systems     Constitutional:  Denies recent weight loss, weight gain, fever or chills     HENT:  Denies any hearing loss, epistaxis, hoarseness, or difficulty speaking.     Eyes: Wears eyeglasses or contact lenses     Respiratory:  Dyspnea with exertion, no cough, wheezing, or hemoptysis.     Cardiovascular: See HPI.  NYHA class II dyspnea on exertion.  No chest pain.  Occasional palpitation.    Gastrointestinal:  Denies change in bowel habits, dyspepsia, ulcer disease, hematochezia, or melena.     Endocrine: Negative for cold intolerance, heat intolerance, polydipsia, polyphagia and polyuria. Denies any history of weight change, heat/cold intolerance, polydipsia, polyuria     Genitourinary: " Negative.      Musculoskeletal: DJD.  Chronic back pain    Skin:  Denies any change in hair or nails, rashes, or skin lesions.     Allergic/Immunologic: Negative.  Negative for environmental allergies, food allergies and/or immunocompromised state.     Neurological:  Denies any history of recurrent headaches, strokes, TIA, or seizure disorder.     Hematological: Denies any food allergies, seasonal allergies, bleeding disorders, or lymphadenopathy.  History of thrombocytopenia    Psychiatric/Behavioral: Denies any history of depression, substance abuse, or change in cognitive function.         Physical Exam     Constitutional: Cooperative, alert and oriented, well-developed, well-nourished, in no acute distress.     HENT:   Head: Normocephalic, normal hair patterns, no masses or tenderness.  Ears, Nose, and Throat: No gross abnormalities. No pallor or cyanosis. Dentition good.   Eyes: EOMS intact, PERRL, conjunctivae and lids unremarkable. Fundoscopic exam and visual fields not performed.   Neck: No palpable masses or adenopathy, no thyromegaly, no JVD, carotid pulses are full and equal bilaterally and without bruit.     Cardiovascular: irregular rhythm, S1 variable, S2 normal, no S3 or S4.  No murmurs, gallops, or rubs detected.     Pulmonary/Chest: Chest: normal symmetry, normal respiratory excursion, no intercostal retraction, no use of accessory muscles.     Pulmonary: Normal breath sounds. No rales or rhonchi.    Abdominal: Abdomen soft, bowel sounds normoactive, no masses, no hepatosplenomegaly, nontender, no bruit.     Musculoskeletal: No deformities, clubbing, cyanosis, erythema, or edema observed.     Neurological: No gross motor or sensory deficits noted, affect appropriate, oriented to time, person, place.     Skin: Warm and dry to the touch, no apparent skin lesion or mass noted.     Psychiatric: He has a normal mood and affect. His behavior is normal. Judgment and thought content normal.          Procedures      Lab Results   Component Value Date    WBC 7.02 06/30/2021    HGB 15.2 06/30/2021    HCT 44.6 06/30/2021    MCV 90.3 06/30/2021     06/30/2021     Lab Results   Component Value Date    GLUCOSE 108 (H) 06/30/2021    BUN 17 06/30/2021    CREATININE 1.39 (H) 06/30/2021    EGFRIFNONA 51 (L) 06/30/2021    BCR 12.2 06/30/2021    CO2 27.2 06/30/2021    CALCIUM 9.3 06/30/2021    ALBUMIN 4.40 06/30/2021    AST 24 06/30/2021    ALT 18 06/30/2021     Lab Results   Component Value Date    CHOL 138 06/30/2021    CHOL 135 08/31/2020     Lab Results   Component Value Date    TRIG 51 06/30/2021    TRIG 55 08/31/2020     Lab Results   Component Value Date    HDL 54 06/30/2021    HDL 56 08/31/2020     No components found for: LDLCALC  Lab Results   Component Value Date    LDL 73 06/30/2021    LDL 68 08/31/2020     No results found for: HDLLDLRATIO  No components found for: CHOLHDL  Lab Results   Component Value Date    HGBA1C 5.92 (H) 06/30/2021     Lab Results   Component Value Date    TSH 0.518 08/31/2020           ASSESSMENT AND PLAN  Sergio Sandoval is a 66-year-old male with history of hypertension, hyperlipidemia, degenerative joint disease, cardiac arrhythmias including supraventricular tachycardia in the remote past and currently atrial fibrillation which appears to be persistent and present at least since 1998.  He has failed 2 previous ablations for atrial fibrillation.  His not followed up with cardiology now for several years.  He has been on flecainide for several years.   I explained the diagnosis to him in great detail and went over his risk of increased embolic cerebrovascular events.  His CHADSVASC score is 2-3 and hence I believe that he would benefit from long-term anticoagulation with Eliquis and this has been initiated at 5 mg twice a day.  I do not see any advantage to continuing flecainide since it appears that he has persistent atrial fibrillation.  This has been discontinued.  His  current antihypertensive therapy with carvedilol and telmisartan HCTZ have been continued.  He is adequately rate controlled at the present time.  To reassess left ventricular and valvular function and left atrial size and echocardiogram is being arranged and a Lexiscan Cardiolite stress test to assess myocardial perfusion is being arranged.  He has no history of tobacco or alcohol use.  He had several questions with regards to his cardiac status and these were explained to him.  His thyroid function studies are within normal limits.    Diagnoses and all orders for this visit:    1. Essential hypertension (Primary)  -     ECG 12 Lead  -     Adult Transthoracic Echo Complete w/ Color, Spectral and Contrast if Necessary Per Protocol; Future  -     Stress Test With Myocardial Perfusion One Day; Future    2. Atrial fibrillation, controlled (CMS/HCC)  -     Adult Transthoracic Echo Complete w/ Color, Spectral and Contrast if Necessary Per Protocol; Future  -     Stress Test With Myocardial Perfusion One Day; Future    3. Mixed hyperlipidemia  -     Adult Transthoracic Echo Complete w/ Color, Spectral and Contrast if Necessary Per Protocol; Future  -     Stress Test With Myocardial Perfusion One Day; Future    4. Dyspnea on exertion  -     Adult Transthoracic Echo Complete w/ Color, Spectral and Contrast if Necessary Per Protocol; Future  -     Stress Test With Myocardial Perfusion One Day; Future    Other orders  -     apixaban (ELIQUIS) 5 MG tablet tablet; Take 1 tablet by mouth Every 12 (Twelve) Hours.  Dispense: 180 tablet; Refill: 3        Patient's Body mass index is 27.12 kg/m². indicating that he is overweight (BMI 25-29.9). Obesity-related health conditions include the following: hypertension, dyslipidemias and osteoarthritis. Obesity is unchanged. BMI is is above average; BMI management plan is completed. We discussed portion control and increasing exercise..      Sergio Pritchardoscar  reports that he has never smoked.  He has never used smokeless tobacco.      Rojas Higuera MD  9/7/2021  08:43 CDT

## 2021-09-13 LAB
QT INTERVAL: 376 MS
QTC INTERVAL: 397 MS

## 2021-09-16 ENCOUNTER — HOSPITAL ENCOUNTER (OUTPATIENT)
Dept: NUCLEAR MEDICINE | Facility: HOSPITAL | Age: 66
Discharge: HOME | End: 2021-09-16

## 2021-09-16 ENCOUNTER — HOSPITAL ENCOUNTER (OUTPATIENT)
Dept: CARDIOLOGY | Facility: HOSPITAL | Age: 66
Discharge: HOME | End: 2021-09-16

## 2021-09-16 DIAGNOSIS — I48.91 ATRIAL FIBRILLATION, CONTROLLED (HCC): ICD-10-CM

## 2021-09-16 DIAGNOSIS — E78.2 MIXED HYPERLIPIDEMIA: ICD-10-CM

## 2021-09-16 DIAGNOSIS — I10 ESSENTIAL HYPERTENSION: ICD-10-CM

## 2021-09-16 DIAGNOSIS — R06.09 DYSPNEA ON EXERTION: ICD-10-CM

## 2021-09-16 LAB
BH CV REST NUCLEAR ISOTOPE DOSE: 10.8 MCI
BH CV STRESS BP STAGE 1: NORMAL
BH CV STRESS COMMENTS STAGE 1: NORMAL
BH CV STRESS DOSE REGADENOSON STAGE 1: 0.4
BH CV STRESS DURATION MIN STAGE 1: 0
BH CV STRESS DURATION SEC STAGE 1: 10
BH CV STRESS HR STAGE 1: 92
BH CV STRESS NUCLEAR ISOTOPE DOSE: 35 MCI
BH CV STRESS PROTOCOL 1: NORMAL
BH CV STRESS RECOVERY BP: NORMAL MMHG
BH CV STRESS RECOVERY HR: 85 BPM
BH CV STRESS STAGE 1: 1
LV EF NUC BP: 67 %
MAXIMAL PREDICTED HEART RATE: 155 BPM
PERCENT MAX PREDICTED HR: 64.52 %
STRESS BASELINE BP: NORMAL MMHG
STRESS BASELINE HR: 100 BPM
STRESS PERCENT HR: 76 %
STRESS POST ESTIMATED WORKLOAD: 1 METS
STRESS POST PEAK BP: NORMAL MMHG
STRESS POST PEAK HR: 100 BPM
STRESS TARGET HR: 132 BPM

## 2021-09-16 PROCEDURE — A9500 TC99M SESTAMIBI: HCPCS | Performed by: INTERNAL MEDICINE

## 2021-09-16 PROCEDURE — 78452 HT MUSCLE IMAGE SPECT MULT: CPT | Performed by: INTERNAL MEDICINE

## 2021-09-16 PROCEDURE — 0 TECHNETIUM SESTAMIBI: Performed by: INTERNAL MEDICINE

## 2021-09-16 PROCEDURE — 78452 HT MUSCLE IMAGE SPECT MULT: CPT

## 2021-09-16 PROCEDURE — 93018 CV STRESS TEST I&R ONLY: CPT | Performed by: INTERNAL MEDICINE

## 2021-09-16 PROCEDURE — 25010000002 REGADENOSON 0.4 MG/5ML SOLUTION: Performed by: INTERNAL MEDICINE

## 2021-09-16 PROCEDURE — 93017 CV STRESS TEST TRACING ONLY: CPT

## 2021-09-16 PROCEDURE — 93016 CV STRESS TEST SUPVJ ONLY: CPT | Performed by: INTERNAL MEDICINE

## 2021-09-16 RX ORDER — SODIUM CHLORIDE 0.9 % (FLUSH) 0.9 %
10 SYRINGE (ML) INJECTION ONCE
Status: COMPLETED | OUTPATIENT
Start: 2021-09-16 | End: 2021-09-16

## 2021-09-16 RX ADMIN — TECHNETIUM TC 99M SESTAMIBI 1 DOSE: 1 INJECTION INTRAVENOUS at 07:52

## 2021-09-16 RX ADMIN — SODIUM CHLORIDE, PRESERVATIVE FREE 10 ML: 5 INJECTION INTRAVENOUS at 08:49

## 2021-09-16 RX ADMIN — TECHNETIUM TC 99M SESTAMIBI 1 DOSE: 1 INJECTION INTRAVENOUS at 08:50

## 2021-09-16 RX ADMIN — REGADENOSON 0.4 MG: 0.08 INJECTION, SOLUTION INTRAVENOUS at 08:49

## 2021-09-17 ENCOUNTER — TELEPHONE (OUTPATIENT)
Dept: CARDIOLOGY | Facility: CLINIC | Age: 66
End: 2021-09-17

## 2021-09-19 NOTE — PROGRESS NOTES
Chief Complaint  Follow-up (3 month)    Subjective          Sergio Sandoval presents to [facility] [place]     Pt is 66 yo male with management of, GERD, HTN, HLP,  CAD, sp shoulder surgery, SVT, prediabetes, thrombocytopenia     6/28/21 in office visit for recheck on pt's above medical issues. Pt has yet to get labwork ordered on 9/15/20 . Pt continues to take his medications for HTN, HLP, CAD, SVT, GERD. He [facility] and he has yet to see one but now he has Medicare. Pt has history of SVT diagnosed in 1992 while serving in  . He had radiofrequency ablation for his SVT 1993. Done  In [place] at [facility]. He had a cyrogenic ablation in 2013 for his atrial fibrillation. After medications failed to control it.  He has been in NSR since then.  Pt also has a cyst on his middle back that has been present for years. It is getting bigger and it can get irritated at times. Currently there is no drainage     9/28/21 in office visit for recheck on pt's above medical issues. Pt saw Cadriology on 9/7/21 for his SVT and heart issues. Pt had stress test done on 9/16/21 that showed myocardial perfusion study with no evidence of ischemia . Impressions are consistent with low risk study. Pt continues to take his medications for CAD, HTN , allergic rhinitis, SVT, GERD, HTN, BPH. His flecanide was discontinued and pt is to continue coreg and losartan-HCTZ. BP and HR is stable today. Pt states that he stopped celebrex as well and since stopping it he noticed pain in his wrist mostly on right hand and both knees. He denies any family history of rheumatoid arthritis or connective tissue disorders. He is wearing a right wrist brace which does help. He is only taking Tylenol PRN for pain. He has tried neurontin       Wrist Pain   The pain is present in the left wrist and right hand. This is a chronic problem. The current episode started  more than 1 month ago. The problem occurs constantly. The problem has been unchanged. The quality of the pain is described as aching. The pain is at a severity of 0/10. The patient is experiencing no pain. Pertinent negatives include no fever, inability to bear weight, itching, joint locking, joint swelling or limited range of motion. The symptoms are aggravated by activity. He has tried NSAIDS for the symptoms. The treatment provided no relief. Family history does not include gout or rheumatoid arthritis. There is no history of diabetes, gout or rheumatoid arthritis.   Knee Pain   The incident occurred more than 1 week ago. The incident occurred at home. There was no injury mechanism. The pain is present in the right knee and left knee. The quality of the pain is described as aching. Pertinent negatives include no inability to bear weight. He reports no foreign bodies present. The symptoms are aggravated by movement and palpation. He has tried NSAIDs for the symptoms.    Heart Problem  This is a chronic problem. The current episode started more than 1 year ago. The problem occurs constantly. The problem has been unchanged. Associated symptoms include arthralgias and fatigue. Pertinent negatives include no abdominal pain, anorexia, change in bowel habit, chest pain, chills, congestion, coughing, diaphoresis, fever, headaches, joint swelling, myalgias, nausea, neck pain, numbness, sore throat, swollen glands, urinary symptoms, vertigo, visual change, vomiting or weakness. The symptoms are aggravated by bending. Treatments tried: flecanaide  The treatment provided significant relief.   Hypertension   This is a chronic problem. The current episode started more than 1 month ago. The problem is unchanged. The problem is uncontrolled. Pertinent negatives include no anxiety, blurred vision, chest pain, headaches, malaise/fatigue, neck pain, orthopnea, palpitations, peripheral edema, PND, shortness of breath or sweats. Risk  factors for coronary artery disease include male gender and dyslipidemia. Past treatments include beta blockers, angiotensin blockers and diuretics. Current antihypertension treatment includes beta blockers, angiotensin blockers and diuretics. The current treatment provides moderate improvement. There are no compliance problems.  There is no history of angina, kidney disease, CAD/MI, CVA, heart failure, left ventricular hypertrophy, PVD or retinopathy. There is no history of chronic renal disease, coarctation of the aorta, hyperaldosteronism, hypercortisolism, hyperparathyroidism, a hypertension causing med, pheochromocytoma, renovascular disease, sleep apnea or a thyroid problem.   Hyperlipidemia   This is a chronic problem. The current episode started more than 1 year ago. He has no history of chronic renal disease. Pertinent negatives include no chest pain or shortness of breath. The current treatment provides mild improvement of lipids. There are no compliance problems.       Male  Problem   The patient's pertinent negatives include no genital injury, genital itching, genital lesions, pelvic pain, penile discharge, penile pain, priapism, scrotal swelling or testicular pain. Primary symptoms comment: difficulty urinating. This is a new problem. The problem occurs constantly. The problem has been improving  Pertinent negatives include no abdominal pain, anorexia, chest pain, chills, constipation, coughing, diarrhea, discolored urine, dysuria, fever, flank pain, frequency, headaches, hematuria, hesitancy, joint pain, joint swelling, nausea, painful intercourse, rash, shortness of breath, urgency, urinary retention or vomiting. Nothing aggravates the symptoms. He has tried nothing for the symptoms. The treatment provided no relief. He is sexually active. There is no history of chlamydia, cryptorchidism, erectile aid use, erectile dysfunction, a femoral hernia, gonorrhea, herpes simplex, HIV, an inguinal  "hernia, kidney stones, prostatitis, sickle cell disease, syphilis or varicocele. Has tried flomax and had significant relief     Objective   Vital Signs:   /74 (BP Location: Left arm, Patient Position: Sitting, Cuff Size: Adult)   Pulse 68   Temp 97.3 °F (36.3 °C) (Temporal)   Resp 20   Ht 177.8 cm (70\")   Wt 83.5 kg (184 lb 1 oz)   SpO2 99%   BMI 26.41 kg/m²       Physical Exam  Vitals and nursing note reviewed.   Constitutional:       Appearance: He is well-developed. He is not diaphoretic.   HENT:      Head: Normocephalic and atraumatic.      Right Ear: External ear normal.   Eyes:      Conjunctiva/sclera: Conjunctivae normal.      Pupils: Pupils are equal, round, and reactive to light.   Cardiovascular:      Rate and Rhythm: Normal rate and regular rhythm.      Heart sounds: Normal heart sounds. No murmur heard.     Pulmonary:      Effort: Pulmonary effort is normal. No respiratory distress.      Breath sounds: Normal breath sounds.   Abdominal:      General: Bowel sounds are normal. There is no distension.      Palpations: Abdomen is soft.      Tenderness: There is no abdominal tenderness.   Musculoskeletal:         General: Tenderness present. No deformity.      Right wrist: Tenderness and bony tenderness present. Decreased range of motion.      Left wrist: Tenderness and bony tenderness present. Decreased range of motion.      Cervical back: Normal range of motion and neck supple.      Right knee: Bony tenderness present. Decreased range of motion. Tenderness present.      Left knee: Bony tenderness present. Decreased range of motion. Tenderness present.   Skin:     General: Skin is warm.      Coloration: Skin is not pale.      Findings: No erythema or rash.   Neurological:      Mental Status: He is alert and oriented to person, place, and time.      Cranial Nerves: No cranial nerve deficit.   Psychiatric:         Behavior: Behavior normal.        Result Review :                 Assessment and " Plan    Diagnoses and all orders for this visit:    1. Bilateral wrist pain (Primary)  -     XR Wrist 3+ View Bilateral; Future    2. Mixed hyperlipidemia  -     CBC Auto Differential; Future  -     Comprehensive Metabolic Panel; Future  -     Hemoglobin A1c; Future  -     Lipid Panel; Future  -     Vitamin D 25 Hydroxy; Future  -     Vitamin B12; Future  -     POP; Future  -     Rheumatoid Factor; Future  -     Sedimentation Rate; Future  -     C-reactive Protein; Future  -     POP Comprehensive Panel; Future  -     Uric Acid; Future  -     HLA-B27 Antigen; Future  -     Cyclic Citrul Peptide Antibody, IgG / IgA; Future    3. Gastroesophageal reflux disease, unspecified whether esophagitis present  -     CBC Auto Differential; Future  -     Comprehensive Metabolic Panel; Future  -     Hemoglobin A1c; Future  -     Lipid Panel; Future  -     Vitamin D 25 Hydroxy; Future  -     Vitamin B12; Future  -     POP; Future  -     Rheumatoid Factor; Future  -     Sedimentation Rate; Future  -     C-reactive Protein; Future  -     POP Comprehensive Panel; Future  -     Uric Acid; Future  -     HLA-B27 Antigen; Future  -     Cyclic Citrul Peptide Antibody, IgG / IgA; Future    4. Essential hypertension  -     CBC Auto Differential; Future  -     Comprehensive Metabolic Panel; Future  -     Hemoglobin A1c; Future  -     Lipid Panel; Future  -     Vitamin D 25 Hydroxy; Future  -     Vitamin B12; Future  -     POP; Future  -     Rheumatoid Factor; Future  -     Sedimentation Rate; Future  -     C-reactive Protein; Future  -     POP Comprehensive Panel; Future  -     Uric Acid; Future  -     HLA-B27 Antigen; Future  -     Cyclic Citrul Peptide Antibody, IgG / IgA; Future    5. Thrombocytopenia (CMS/HCC)  -     CBC Auto Differential; Future  -     Comprehensive Metabolic Panel; Future  -     Hemoglobin A1c; Future  -     Lipid Panel; Future  -     Vitamin D 25 Hydroxy; Future  -     Vitamin B12; Future  -     POP; Future  -      Rheumatoid Factor; Future  -     Sedimentation Rate; Future  -     C-reactive Protein; Future  -     POP Comprehensive Panel; Future  -     Uric Acid; Future  -     HLA-B27 Antigen; Future  -     Cyclic Citrul Peptide Antibody, IgG / IgA; Future    6. Vitamin D deficiency  -     CBC Auto Differential; Future  -     Comprehensive Metabolic Panel; Future  -     Hemoglobin A1c; Future  -     Lipid Panel; Future  -     Vitamin D 25 Hydroxy; Future  -     Vitamin B12; Future  -     POP; Future  -     Rheumatoid Factor; Future  -     Sedimentation Rate; Future  -     C-reactive Protein; Future  -     POP Comprehensive Panel; Future  -     Uric Acid; Future  -     HLA-B27 Antigen; Future  -     Cyclic Citrul Peptide Antibody, IgG / IgA; Future    7. Prediabetes  -     CBC Auto Differential; Future  -     Comprehensive Metabolic Panel; Future  -     Hemoglobin A1c; Future  -     Lipid Panel; Future  -     Vitamin D 25 Hydroxy; Future  -     Vitamin B12; Future  -     POP; Future  -     Rheumatoid Factor; Future  -     Sedimentation Rate; Future  -     C-reactive Protein; Future  -     POP Comprehensive Panel; Future  -     Uric Acid; Future  -     HLA-B27 Antigen; Future  -     Cyclic Citrul Peptide Antibody, IgG / IgA; Future    8. Atrial fibrillation, unspecified type (CMS/HCC)  -     POP; Future  -     Rheumatoid Factor; Future  -     Sedimentation Rate; Future  -     C-reactive Protein; Future  -     POP Comprehensive Panel; Future  -     Uric Acid; Future  -     HLA-B27 Antigen; Future  -     Cyclic Citrul Peptide Antibody, IgG / IgA; Future    9. Multiple joint pain  -     POP; Future  -     Rheumatoid Factor; Future  -     Sedimentation Rate; Future  -     C-reactive Protein; Future  -     POP Comprehensive Panel; Future  -     Uric Acid; Future  -     HLA-B27 Antigen; Future  -     Cyclic Citrul Peptide Antibody, IgG / IgA; Future    10. Pain in both knees, unspecified chronicity  -     XR Knee 1 or 2 View  Bilateral; Future  -     XR Wrist 3+ View Bilateral; Future         -recommend labowrk  -recommend COVID-19 vaccination  -recommend shingles/pneumonia vaccination   -influenza vaccination today   -recommend tdap vaccination  -bilateral knee and wrist pain - will get x-ray. Consider ORthopedic referral. Continue on tylenol PRN. Will get rheumatoid panel. Consider MRI of knee or wrist   -dizziness -currently stable   -overweight -BMI at 26.41   -BPH - reviewed recent US of prostate.- on flomax 0.4 mg PO qhs.    -prediabetes - prediabetes meal plan information provided  -thrombocytopenia - continue to monitor. Pt reports no active bleeding or bruising. Consider Hematology referral   -family history prostate cancer/difficulty urinating/BPH - recent PSA normal .recent US of prostate shows enlarged prostate . Start on flomax 0.4 m PO qhs   -SVT/atrial fibrillation  -was on flecanide 50 mg PO BID stopped  Now on coreg BID and losartan-HCTZ daily.   Cardiology following. Recent stress test was low risk study   -HLP - on simvastatin 80 mg PO qhs recommend heart healthy diet   -HTN  - on micadix 40-12.5 mg PO q daily,  Coreg 6.25 mg PO BID,    -GERD - on prilosec 20 mg PO q daily  -vitamin D defiicency -vitamin D once a week   -advised pt to be safe and call with questions and concerns  -advised pt to go to ER or call 911 if symptoms worrisome or severe  -advised pt to followup with specialist and referrals  -advised pt to be safe during COVID-19 pandemic  I spent 30  minutes caring for Sergio on this date of service. This time includes time spent by me in the following activities: preparing for the visit, reviewing tests, obtaining and/or reviewing a separately obtained history, performing a medically appropriate examination and/or evaluation, counseling and educating the patient/family/caregiver, ordering medications, tests, or procedures, referring and communicating with other health care professionals, documenting  information in the medical record, independently interpreting results and communicating that information with the patient/family/caregiver and care coordination  -recheck in 1 month         This document has been electronically signed by Oneil Jorgensen MD on September 28, 2021 15:35 CDT        Follow Up   Return in about 1 month (around 10/28/2021).  Patient was given instructions and counseling regarding his condition or for health maintenance advice. Please see specific information pulled into the AVS if appropriate.

## 2021-09-27 ENCOUNTER — TELEPHONE (OUTPATIENT)
Dept: FAMILY MEDICINE CLINIC | Facility: CLINIC | Age: 66
End: 2021-09-27

## 2021-09-28 ENCOUNTER — OFFICE VISIT (OUTPATIENT)
Dept: FAMILY MEDICINE CLINIC | Facility: CLINIC | Age: 66
End: 2021-09-28

## 2021-09-28 VITALS
OXYGEN SATURATION: 99 % | DIASTOLIC BLOOD PRESSURE: 74 MMHG | TEMPERATURE: 97.3 F | HEART RATE: 68 BPM | WEIGHT: 184.06 LBS | SYSTOLIC BLOOD PRESSURE: 100 MMHG | BODY MASS INDEX: 26.35 KG/M2 | HEIGHT: 70 IN | RESPIRATION RATE: 20 BRPM

## 2021-09-28 DIAGNOSIS — E55.9 VITAMIN D DEFICIENCY: ICD-10-CM

## 2021-09-28 DIAGNOSIS — M25.531 BILATERAL WRIST PAIN: Primary | ICD-10-CM

## 2021-09-28 DIAGNOSIS — M25.50 MULTIPLE JOINT PAIN: ICD-10-CM

## 2021-09-28 DIAGNOSIS — E78.2 MIXED HYPERLIPIDEMIA: ICD-10-CM

## 2021-09-28 DIAGNOSIS — D69.6 THROMBOCYTOPENIA (HCC): ICD-10-CM

## 2021-09-28 DIAGNOSIS — K21.9 GASTROESOPHAGEAL REFLUX DISEASE, UNSPECIFIED WHETHER ESOPHAGITIS PRESENT: ICD-10-CM

## 2021-09-28 DIAGNOSIS — I48.91 ATRIAL FIBRILLATION, UNSPECIFIED TYPE (HCC): ICD-10-CM

## 2021-09-28 DIAGNOSIS — R73.03 PREDIABETES: ICD-10-CM

## 2021-09-28 DIAGNOSIS — M25.562 PAIN IN BOTH KNEES, UNSPECIFIED CHRONICITY: ICD-10-CM

## 2021-09-28 DIAGNOSIS — I10 ESSENTIAL HYPERTENSION: ICD-10-CM

## 2021-09-28 DIAGNOSIS — M25.561 PAIN IN BOTH KNEES, UNSPECIFIED CHRONICITY: ICD-10-CM

## 2021-09-28 DIAGNOSIS — M25.532 BILATERAL WRIST PAIN: Primary | ICD-10-CM

## 2021-09-28 PROCEDURE — 99214 OFFICE O/P EST MOD 30 MIN: CPT | Performed by: FAMILY MEDICINE

## 2021-09-29 ENCOUNTER — TELEPHONE (OUTPATIENT)
Dept: FAMILY MEDICINE CLINIC | Facility: CLINIC | Age: 66
End: 2021-09-29

## 2021-09-29 ENCOUNTER — LAB (OUTPATIENT)
Dept: LAB | Facility: HOSPITAL | Age: 66
End: 2021-09-29

## 2021-09-29 DIAGNOSIS — E78.2 MIXED HYPERLIPIDEMIA: ICD-10-CM

## 2021-09-29 DIAGNOSIS — I48.91 ATRIAL FIBRILLATION, UNSPECIFIED TYPE (HCC): ICD-10-CM

## 2021-09-29 DIAGNOSIS — R73.03 PREDIABETES: ICD-10-CM

## 2021-09-29 DIAGNOSIS — E55.9 VITAMIN D DEFICIENCY: ICD-10-CM

## 2021-09-29 DIAGNOSIS — M25.50 MULTIPLE JOINT PAIN: ICD-10-CM

## 2021-09-29 DIAGNOSIS — I10 ESSENTIAL HYPERTENSION: ICD-10-CM

## 2021-09-29 DIAGNOSIS — D69.6 THROMBOCYTOPENIA (HCC): ICD-10-CM

## 2021-09-29 DIAGNOSIS — N17.9 STAGE 2 ACUTE KIDNEY INJURY (HCC): ICD-10-CM

## 2021-09-29 DIAGNOSIS — K21.9 GASTROESOPHAGEAL REFLUX DISEASE, UNSPECIFIED WHETHER ESOPHAGITIS PRESENT: ICD-10-CM

## 2021-09-29 LAB
25(OH)D3 SERPL-MCNC: 53.6 NG/ML
ALBUMIN SERPL-MCNC: 4.6 G/DL (ref 3.5–5.2)
ALBUMIN/GLOB SERPL: 2.1 G/DL
ALP SERPL-CCNC: 49 U/L (ref 39–117)
ALT SERPL W P-5'-P-CCNC: 19 U/L (ref 1–41)
ANION GAP SERPL CALCULATED.3IONS-SCNC: 10.2 MMOL/L (ref 5–15)
AST SERPL-CCNC: 23 U/L (ref 1–40)
BASOPHILS # BLD AUTO: 0.01 10*3/MM3 (ref 0–0.2)
BASOPHILS NFR BLD AUTO: 0.1 % (ref 0–1.5)
BILIRUB SERPL-MCNC: 0.8 MG/DL (ref 0–1.2)
BUN SERPL-MCNC: 18 MG/DL (ref 8–23)
BUN/CREAT SERPL: 14.6 (ref 7–25)
CALCIUM SPEC-SCNC: 9.7 MG/DL (ref 8.6–10.5)
CHLORIDE SERPL-SCNC: 105 MMOL/L (ref 98–107)
CHOLEST SERPL-MCNC: 135 MG/DL (ref 0–200)
CHROMATIN AB SERPL-ACNC: <10 IU/ML (ref 0–14)
CO2 SERPL-SCNC: 27.8 MMOL/L (ref 22–29)
CREAT SERPL-MCNC: 1.23 MG/DL (ref 0.76–1.27)
CRP SERPL-MCNC: <0.3 MG/DL (ref 0–0.5)
DEPRECATED RDW RBC AUTO: 43 FL (ref 37–54)
EOSINOPHIL # BLD AUTO: 0.03 10*3/MM3 (ref 0–0.4)
EOSINOPHIL NFR BLD AUTO: 0.4 % (ref 0.3–6.2)
ERYTHROCYTE [DISTWIDTH] IN BLOOD BY AUTOMATED COUNT: 11.9 % (ref 12.3–15.4)
ERYTHROCYTE [SEDIMENTATION RATE] IN BLOOD: 4 MM/HR (ref 0–20)
GFR SERPL CREATININE-BSD FRML MDRD: 59 ML/MIN/1.73
GLOBULIN UR ELPH-MCNC: 2.2 GM/DL
GLUCOSE SERPL-MCNC: 101 MG/DL (ref 65–99)
HBA1C MFR BLD: 5.5 % (ref 4.8–5.6)
HCT VFR BLD AUTO: 46.1 % (ref 37.5–51)
HDLC SERPL-MCNC: 57 MG/DL (ref 40–60)
HGB BLD-MCNC: 14.8 G/DL (ref 13–17.7)
LDLC SERPL CALC-MCNC: 66 MG/DL (ref 0–100)
LDLC/HDLC SERPL: 1.19 {RATIO}
LYMPHOCYTES # BLD AUTO: 1.08 10*3/MM3 (ref 0.7–3.1)
LYMPHOCYTES NFR BLD AUTO: 13.8 % (ref 19.6–45.3)
MCH RBC QN AUTO: 31 PG (ref 26.6–33)
MCHC RBC AUTO-ENTMCNC: 32.1 G/DL (ref 31.5–35.7)
MCV RBC AUTO: 96.6 FL (ref 79–97)
MONOCYTES # BLD AUTO: 0.51 10*3/MM3 (ref 0.1–0.9)
MONOCYTES NFR BLD AUTO: 6.5 % (ref 5–12)
NEUTROPHILS NFR BLD AUTO: 6.2 10*3/MM3 (ref 1.7–7)
NEUTROPHILS NFR BLD AUTO: 79.1 % (ref 42.7–76)
PHOSPHATE SERPL-MCNC: 3.2 MG/DL (ref 2.5–4.5)
PLATELET # BLD AUTO: 161 10*3/MM3 (ref 140–450)
PMV BLD AUTO: 11.1 FL (ref 6–12)
POTASSIUM SERPL-SCNC: 4.2 MMOL/L (ref 3.5–5.2)
PROT SERPL-MCNC: 6.8 G/DL (ref 6–8.5)
RBC # BLD AUTO: 4.77 10*6/MM3 (ref 4.14–5.8)
SODIUM SERPL-SCNC: 143 MMOL/L (ref 136–145)
TRIGL SERPL-MCNC: 52 MG/DL (ref 0–150)
URATE SERPL-MCNC: 6.3 MG/DL (ref 3.4–7)
VIT B12 BLD-MCNC: 623 PG/ML (ref 211–946)
VLDLC SERPL-MCNC: 12 MG/DL (ref 5–40)
WBC # BLD AUTO: 7.84 10*3/MM3 (ref 3.4–10.8)

## 2021-09-29 PROCEDURE — 86235 NUCLEAR ANTIGEN ANTIBODY: CPT

## 2021-09-29 PROCEDURE — 82607 VITAMIN B-12: CPT

## 2021-09-29 PROCEDURE — 84550 ASSAY OF BLOOD/URIC ACID: CPT

## 2021-09-29 PROCEDURE — 82306 VITAMIN D 25 HYDROXY: CPT

## 2021-09-29 PROCEDURE — 86140 C-REACTIVE PROTEIN: CPT

## 2021-09-29 PROCEDURE — 85025 COMPLETE CBC W/AUTO DIFF WBC: CPT

## 2021-09-29 PROCEDURE — 86431 RHEUMATOID FACTOR QUANT: CPT

## 2021-09-29 PROCEDURE — 80053 COMPREHEN METABOLIC PANEL: CPT

## 2021-09-29 PROCEDURE — 86200 CCP ANTIBODY: CPT

## 2021-09-29 PROCEDURE — 86225 DNA ANTIBODY NATIVE: CPT

## 2021-09-29 PROCEDURE — 80061 LIPID PANEL: CPT

## 2021-09-29 PROCEDURE — 90662 IIV NO PRSV INCREASED AG IM: CPT | Performed by: FAMILY MEDICINE

## 2021-09-29 PROCEDURE — 86038 ANTINUCLEAR ANTIBODIES: CPT

## 2021-09-29 PROCEDURE — 85652 RBC SED RATE AUTOMATED: CPT

## 2021-09-29 PROCEDURE — 84100 ASSAY OF PHOSPHORUS: CPT

## 2021-09-29 PROCEDURE — 83036 HEMOGLOBIN GLYCOSYLATED A1C: CPT

## 2021-09-29 PROCEDURE — 90471 IMMUNIZATION ADMIN: CPT | Performed by: FAMILY MEDICINE

## 2021-09-30 LAB
ANA SER QL: NEGATIVE
CCP IGA+IGG SERPL IA-ACNC: 5 UNITS (ref 0–19)
CENTROMERE B AB SER-ACNC: <0.2 AI (ref 0–0.9)
CHROMATIN AB SERPL-ACNC: <0.2 AI (ref 0–0.9)
DSDNA AB SER-ACNC: 8 IU/ML (ref 0–9)
ENA JO1 AB SER-ACNC: <0.2 AI (ref 0–0.9)
ENA RNP AB SER-ACNC: 0.2 AI (ref 0–0.9)
ENA SCL70 AB SER-ACNC: <0.2 AI (ref 0–0.9)
ENA SM AB SER-ACNC: <0.2 AI (ref 0–0.9)
ENA SS-A AB SER-ACNC: <0.2 AI (ref 0–0.9)
ENA SS-B AB SER-ACNC: <0.2 AI (ref 0–0.9)
Lab: NORMAL

## 2021-09-30 RX ORDER — TAMSULOSIN HYDROCHLORIDE 0.4 MG/1
1 CAPSULE ORAL DAILY
Qty: 30 CAPSULE | Refills: 3 | Status: SHIPPED | OUTPATIENT
Start: 2021-09-30 | End: 2022-03-04 | Stop reason: SDUPTHER

## 2021-10-01 NOTE — TELEPHONE ENCOUNTER
[facility] called as patient turned in a script dated June of this year for Simvastatin 80 mg.  Pharmacist was verifying the increase of dosage as the last  provider had written it for 20 mg.  Pharmacist stated that they are going to give him a 30 day supply of the 20 mg and when the patient comes in for his appointment the end of the month if provider still wants to increase it to 80 mg send in a new RX

## 2021-10-04 ENCOUNTER — TELEPHONE (OUTPATIENT)
Dept: FAMILY MEDICINE CLINIC | Facility: CLINIC | Age: 66
End: 2021-10-04

## 2021-10-04 NOTE — TELEPHONE ENCOUNTER
----- Message from Oneil Jorgensen MD sent at 10/3/2021  8:48 PM CDT -----  Please let pt know that all labwork stable except on CMP kidney function shows GFR at 59 from 51. Recommend Nephrology referral. Needs more water intake. Likely due to uncontrolled HTN and NSAID use. If pt agreeable let me know and I will put in referral     Pt currently not prediabetic.     Recheck on next visit thanks

## 2021-10-06 LAB — HLA-B27 QL NAA+PROBE: NEGATIVE

## 2021-10-08 RX ORDER — SIMVASTATIN 20 MG
20 TABLET ORAL NIGHTLY
Qty: 30 TABLET | Refills: 3 | Status: SHIPPED | OUTPATIENT
Start: 2021-10-08 | End: 2022-03-04 | Stop reason: SDUPTHER

## 2021-10-11 DIAGNOSIS — I10 UNCONTROLLED HYPERTENSION: ICD-10-CM

## 2021-10-11 DIAGNOSIS — N18.31 STAGE 3A CHRONIC KIDNEY DISEASE (HCC): Primary | ICD-10-CM

## 2021-10-11 RX ORDER — CARVEDILOL 6.25 MG/1
6.25 TABLET ORAL 2 TIMES DAILY WITH MEALS
Qty: 60 TABLET | Refills: 0 | Status: SHIPPED | OUTPATIENT
Start: 2021-10-11 | End: 2021-11-16 | Stop reason: SDUPTHER

## 2021-10-25 NOTE — PROGRESS NOTES
Chief Complaint  Hyperlipidemia, Hypertension, Heartburn, Vitamin D Deficiency, and Atrial Fibrillation    Subjective          Sergio Kevin Sandoval presents to [facility] [place]       Pt is 67 yo male with management of, GERD, HTN, HLP,  CAD, sp shoulder surgery, SVT, prediabetes, thrombocytopenia, atrial fibrillation      9/28/21 in office visit for recheck on pt's above medical issues. Pt saw Cadriology on 9/7/21 for his SVT and heart issues. Pt had stress test done on 9/16/21 that showed myocardial perfusion study with no evidence of ischemia . Impressions are consistent with low risk study. Pt continues to take his medications for CAD, HTN , allergic rhinitis, SVT, GERD, HTN, BPH. His flecanide was discontinued and pt is to continue coreg and losartan-HCTZ. BP and HR is stable today. Pt states that he stopped celebrex as well and since stopping it he noticed pain in his wrist mostly on right hand and both knees. He denies any family history of rheumatoid arthritis or connective tissue disorders. He is wearing a right wrist brace which does help. He is only taking Tylenol PRN for pain. He has tried neurontin     10/28/21 in office visit for recheck on pts' above medical issues. Pt had labwork done on 9/29/21 that showed negative rheumatoid panel hga1c is normal CBC showed GFR at 59 from 51.  CBC showed stable hemoglobin and WBC. Pt continues to take his medications for GERD, HTN, HLP,atrial fibrilation  he had x-rays of both knees that showed very mild degenerative arthritis. X-ray of wrist bilaterally were normal .  Pt state his wrist is better. Knees still having some pain.  He stopped taking his NSAIDS and ha upcoming appt with Nephrology soon.          Chronic Kidney Disease  This is a chronic problem. The current episode started more than 1 year ago. The problem occurs constantly. The problem has been unchanged. Associated symptoms include arthralgias. Pertinent  negatives include no abdominal pain, anorexia, change in bowel habit, chest pain, chills, congestion, coughing, fatigue, fever, headaches, joint swelling, myalgias, nausea, neck pain, numbness, sore throat, swollen glands, urinary symptoms, vertigo, visual change, vomiting or weakness. Nothing aggravates the symptoms. He has tried nothing for the symptoms. The treatment provided no relief.   Wrist Pain   The pain is present in the left wrist and right hand. This is a chronic problem. The current episode started more than 1 month ago. The problem occurs constantly. The problem has been unchanged. The quality of the pain is described as aching. The pain is at a severity of 0/10. The patient is experiencing no pain. Pertinent negatives include no fever, inability to bear weight, itching, joint locking, joint swelling or limited range of motion. The symptoms are aggravated by activity. He has tried NSAIDS for the symptoms. The treatment provided no relief. Family history does not include gout or rheumatoid arthritis. There is no history of diabetes, gout or rheumatoid arthritis.   Knee Pain   The incident occurred more than 1 week ago. The incident occurred at home. There was no injury mechanism. The pain is present in the right knee and left knee. The quality of the pain is described as aching. Pertinent negatives include no inability to bear weight. He reports no foreign bodies present. The symptoms are aggravated by movement and palpation. He has tried NSAIDs for the symptoms.   Heart Problem  This is a chronic problem. The current episode started more than 1 year ago. The problem occurs constantly. The problem has been unchanged. Associated symptoms include arthralgias and fatigue. Pertinent negatives include no abdominal pain, anorexia, change in bowel habit, chest pain, chills, congestion, coughing, diaphoresis, fever, headaches, joint swelling, myalgias, nausea, neck pain, numbness, sore throat, swollen  glands, urinary symptoms, vertigo, visual change, vomiting or weakness. The symptoms are aggravated by bending. Treatments tried: flecanaide  The treatment provided significant relief.   Hypertension   This is a chronic problem. The current episode started more than 1 month ago. The problem is unchanged. The problem is uncontrolled. Pertinent negatives include no anxiety, blurred vision, chest pain, headaches, malaise/fatigue, neck pain, orthopnea, palpitations, peripheral edema, PND, shortness of breath or sweats. Risk factors for coronary artery disease include male gender and dyslipidemia. Past treatments include beta blockers, angiotensin blockers and diuretics. Current antihypertension treatment includes beta blockers, angiotensin blockers and diuretics. The current treatment provides moderate improvement. There are no compliance problems.  There is no history of angina, kidney disease, CAD/MI, CVA, heart failure, left ventricular hypertrophy, PVD or retinopathy. There is no history of chronic renal disease, coarctation of the aorta, hyperaldosteronism, hypercortisolism, hyperparathyroidism, a hypertension causing med, pheochromocytoma, renovascular disease, sleep apnea or a thyroid problem.   Hyperlipidemia   This is a chronic problem. The current episode started more than 1 year ago. He has no history of chronic renal disease. Pertinent negatives include no chest pain or shortness of breath. The current treatment provides mild improvement of lipids. There are no compliance problems.     Male  Problem   The patient's pertinent negatives include no genital injury, genital itching, genital lesions, pelvic pain, penile discharge, penile pain, priapism, scrotal swelling or testicular pain. Primary symptoms comment: difficulty urinating. This is a new problem. The problem occurs constantly. The problem has been improving  Pertinent negatives include no abdominal pain, anorexia, chest  "pain, chills, constipation, coughing, diarrhea, discolored urine, dysuria, fever, flank pain, frequency, headaches, hematuria, hesitancy, joint pain, joint swelling, nausea, painful intercourse, rash, shortness of breath, urgency, urinary retention or vomiting. Nothing aggravates the symptoms. He has tried nothing for the symptoms. The treatment provided no relief. He is sexually active. There is no history of chlamydia, cryptorchidism, erectile aid use, erectile dysfunction, a femoral hernia, gonorrhea, herpes simplex, HIV, an inguinal hernia, kidney stones, prostatitis, sickle cell disease, syphilis or varicocele. Has tried flomax and had significant relief      Objective   Vital Signs:   /62 (BP Location: Right arm, Patient Position: Sitting, Cuff Size: Adult)   Pulse 88   Temp 97.8 °F (36.6 °C)   Ht 177.8 cm (70\")   Wt 85.2 kg (187 lb 12.8 oz)   SpO2 98%   BMI 26.95 kg/m²     Physical Exam  Vitals and nursing note reviewed.   Constitutional:       Appearance: He is well-developed. He is not diaphoretic.   HENT:      Head: Normocephalic and atraumatic.      Right Ear: External ear normal.   Eyes:      Conjunctiva/sclera: Conjunctivae normal.      Pupils: Pupils are equal, round, and reactive to light.   Cardiovascular:      Rate and Rhythm: Normal rate. Rhythm irregular.      Heart sounds: Normal heart sounds. No murmur heard.      Pulmonary:      Effort: Pulmonary effort is normal. No respiratory distress.      Breath sounds: Normal breath sounds.   Abdominal:      General: Bowel sounds are normal. There is no distension.      Palpations: Abdomen is soft.      Tenderness: There is no abdominal tenderness.   Musculoskeletal:         General: Tenderness present. No deformity.      Right wrist: Tenderness and bony tenderness present. Decreased range of motion.      Left wrist: Tenderness and bony tenderness present. Decreased range of motion.      Cervical back: Normal range of motion and neck supple. "      Right knee: Bony tenderness present. Decreased range of motion. Tenderness present.      Left knee: Bony tenderness present. Decreased range of motion. Tenderness present.   Skin:     General: Skin is warm.      Coloration: Skin is not pale.      Findings: No erythema or rash.   Neurological:      Mental Status: He is alert and oriented to person, place, and time.      Cranial Nerves: No cranial nerve deficit.   Psychiatric:         Behavior: Behavior normal.        Result Review :                 Assessment and Plan    Diagnoses and all orders for this visit:    1. Mixed hyperlipidemia (Primary)    2. Gastroesophageal reflux disease, unspecified whether esophagitis present    3. Vitamin D deficiency    4. History of prediabetes    5. History of thrombocytopenia    6. Need for pneumococcal vaccine  -     Pneumococcal polysaccharide vaccine 23-valent >= 3yo subcutaneous/IM (PPSV23)    7. Longstanding persistent atrial fibrillation (HCC)    8. Multiple joint pain    Other orders  -     famotidine (Pepcid) 20 MG tablet; Take 1 tablet by mouth 2 (Two) Times a Day.  Dispense: 60 tablet; Refill: 3            -recommend labowrk  -recommend shingles/pneumonia vaccination  - pneumonia vaccination today   -recommend influenza vaccination   -recommend tdap vaccination  -bilateral knee and wrist pain/arthritis of both knees/multiple joint pain  - reviewed x-ray of knees. He will hold off Orthopedic referral for now. Off NSAIDS currently. Recommend cymbalta in event pt needs medication for pain   -dizziness -currently stable   -overweight -BMI at 26.41   -BPH - reviewed recent US of prostate.- on flomax 0.4 mg PO qhs.    -history of prediabetes - prediabetes meal plan information provided  -history pf thrombocytopenia - continue to monitor. Pt reports no active bleeding or bruising. Consider Hematology referral   -CKD stage 3a -referred to Nephrology.     -family history prostate cancer/difficulty urinating/BPH - recent PSA  normal .recent US of prostate shows enlarged prostate . Start on flomax 0.4 m PO qhs   -SVT/atrial fibrillation  -was on flecanide 50 mg PO BID stopped  Now on coreg BID and losartan-HCTZ daily.   Cardiology following. Recent stress test was low risk study. Pt will need to call for an appt   -HLP - on simvastatin 80 mg PO qhs recommend heart healthy diet   -HTN  - on micadix 40-12.5 mg PO q daily,  Coreg 6.25 mg PO BID,    -GERD - stop prilosec start on pepcid 20 mg PO BID   -vitamin D defiicency -vitamin D once a week   -advised pt to be safe and call with questions and concerns  -advised pt to go to ER or call 911 if symptoms worrisome or severe  -advised pt to followup with specialist and referrals  -advised pt to be safe during COVID-19 pandemic  I spent 30  minutes caring for Sergio on this date of service. This time includes time spent by me in the following activities: preparing for the visit, reviewing tests, obtaining and/or reviewing a separately obtained history, performing a medically appropriate examination and/or evaluation, counseling and educating the patient/family/caregiver, ordering medications, tests, or procedures, referring and communicating with other health care professionals, documenting information in the medical record, independently interpreting results and communicating that information with the patient/family/caregiver and care coordination  -recheck in 2 months         This document has been electronically signed by Oneil Jorgensen MD on October 28, 2021 11:01 CDT        Follow Up   Return in about 2 months (around 12/28/2021).  Patient was given instructions and counseling regarding his condition or for health maintenance advice. Please see specific information pulled into the AVS if appropriate.

## 2021-10-27 ENCOUNTER — TELEPHONE (OUTPATIENT)
Dept: FAMILY MEDICINE CLINIC | Facility: CLINIC | Age: 66
End: 2021-10-27

## 2021-10-28 ENCOUNTER — OFFICE VISIT (OUTPATIENT)
Dept: FAMILY MEDICINE CLINIC | Facility: CLINIC | Age: 66
End: 2021-10-28

## 2021-10-28 VITALS
OXYGEN SATURATION: 98 % | WEIGHT: 187.8 LBS | BODY MASS INDEX: 26.88 KG/M2 | DIASTOLIC BLOOD PRESSURE: 62 MMHG | TEMPERATURE: 97.8 F | HEART RATE: 88 BPM | HEIGHT: 70 IN | SYSTOLIC BLOOD PRESSURE: 116 MMHG

## 2021-10-28 DIAGNOSIS — K21.9 GASTROESOPHAGEAL REFLUX DISEASE, UNSPECIFIED WHETHER ESOPHAGITIS PRESENT: ICD-10-CM

## 2021-10-28 DIAGNOSIS — Z23 NEED FOR PNEUMOCOCCAL VACCINE: ICD-10-CM

## 2021-10-28 DIAGNOSIS — Z86.2 HISTORY OF THROMBOCYTOPENIA: ICD-10-CM

## 2021-10-28 DIAGNOSIS — E55.9 VITAMIN D DEFICIENCY: ICD-10-CM

## 2021-10-28 DIAGNOSIS — Z87.898 HISTORY OF PREDIABETES: ICD-10-CM

## 2021-10-28 DIAGNOSIS — E78.2 MIXED HYPERLIPIDEMIA: Primary | ICD-10-CM

## 2021-10-28 DIAGNOSIS — I48.11 LONGSTANDING PERSISTENT ATRIAL FIBRILLATION (HCC): ICD-10-CM

## 2021-10-28 DIAGNOSIS — M25.50 MULTIPLE JOINT PAIN: ICD-10-CM

## 2021-10-28 PROCEDURE — 90732 PPSV23 VACC 2 YRS+ SUBQ/IM: CPT | Performed by: FAMILY MEDICINE

## 2021-10-28 PROCEDURE — G0009 ADMIN PNEUMOCOCCAL VACCINE: HCPCS | Performed by: FAMILY MEDICINE

## 2021-10-28 PROCEDURE — 99214 OFFICE O/P EST MOD 30 MIN: CPT | Performed by: FAMILY MEDICINE

## 2021-10-28 RX ORDER — FAMOTIDINE 20 MG/1
20 TABLET, FILM COATED ORAL 2 TIMES DAILY
Qty: 60 TABLET | Refills: 3 | Status: SHIPPED | OUTPATIENT
Start: 2021-10-28 | End: 2022-03-04 | Stop reason: SDUPTHER

## 2021-10-28 NOTE — PATIENT INSTRUCTIONS
Call and make an appt with sooner appt with Dr Ryan regarding A fib     Stop prilosec start on pepcid 20 mg PO BID Duloxetine delayed-release capsules  What is this medicine?  DULOXETINE (doo LOX e teen) is used to treat depression, anxiety, and different types of chronic pain.  This medicine may be used for other purposes; ask your health care provider or pharmacist if you have questions.  COMMON BRAND NAME(S): Cymbalta, Jon, Cristian  What should I tell my health care provider before I take this medicine?  They need to know if you have any of these conditions:  · bipolar disorder  · glaucoma  · high blood pressure  · kidney disease  · liver disease  · seizures  · suicidal thoughts, plans or attempt; a previous suicide attempt by you or a family member  · take medicines that treat or prevent blood clots  · taken medicines called MAOIs like Carbex, Eldepryl, Marplan, Nardil, and Parnate within 14 days  · trouble passing urine  · an unusual reaction to duloxetine, other medicines, foods, dyes, or preservatives  · pregnant or trying to get pregnant  · breast-feeding  How should I use this medicine?  Take this medicine by mouth with a glass of water. Follow the directions on the prescription label. Do not crush, cut or chew some capsules of this medicine. Some capsules may be opened and sprinkled on applesauce. Check with your doctor or pharmacist if you are not sure. You can take this medicine with or without food. Take your medicine at regular intervals. Do not take your medicine more often than directed. Do not stop taking this medicine suddenly except upon the advice of your doctor. Stopping this medicine too quickly may cause serious side effects or your condition may worsen.  A special MedGuide will be given to you by the pharmacist with each prescription and refill. Be sure to read this information carefully each time.  Talk to your pediatrician regarding the use of this medicine in children. While this drug  may be prescribed for children as young as 7 years of age for selected conditions, precautions do apply.  Overdosage: If you think you have taken too much of this medicine contact a poison control center or emergency room at once.  NOTE: This medicine is only for you. Do not share this medicine with others.  What if I miss a dose?  If you miss a dose, take it as soon as you can. If it is almost time for your next dose, take only that dose. Do not take double or extra doses.  What may interact with this medicine?  Do not take this medicine with any of the following medications:  · desvenlafaxine  · levomilnacipran  · linezolid  · MAOIs like Carbex, Eldepryl, Emsam, Marplan, Nardil, and Parnate  · methylene blue (injected into a vein)  · milnacipran  · safinamide  · thioridazine  · venlafaxine  · viloxazine  This medicine may also interact with the following medications:  · alcohol  · amphetamines  · aspirin and aspirin-like medicines  · certain antibiotics like ciprofloxacin and enoxacin  · certain medicines for blood pressure, heart disease, irregular heart beat  · certain medicines for depression, anxiety, or psychotic disturbances  · certain medicines for migraine headache like almotriptan, eletriptan, frovatriptan, naratriptan, rizatriptan, sumatriptan, zolmitriptan  · certain medicines that treat or prevent blood clots like warfarin, enoxaparin, and dalteparin  · cimetidine  · fentanyl  · lithium  · NSAIDS, medicines for pain and inflammation, like ibuprofen or naproxen  · phentermine  · procarbazine  · rasagiline  · sibutramine  · Nowata's wort  · theophylline  · tramadol  · tryptophan  This list may not describe all possible interactions. Give your health care provider a list of all the medicines, herbs, non-prescription drugs, or dietary supplements you use. Also tell them if you smoke, drink alcohol, or use illegal drugs. Some items may interact with your medicine.  What should I watch for while using this  medicine?  Tell your doctor if your symptoms do not get better or if they get worse. Visit your doctor or healthcare provider for regular checks on your progress. Because it may take several weeks to see the full effects of this medicine, it is important to continue your treatment as prescribed by your doctor.  This medicine may cause serious skin reactions. They can happen weeks to months after starting the medicine. Contact your healthcare provider right away if you notice fevers or flu-like symptoms with a rash. The rash may be red or purple and then turn into blisters or peeling of the skin. Or, you might notice a red rash with swelling of the face, lips, or lymph nodes in your neck or under your arms.  Patients and their families should watch out for new or worsening thoughts of suicide or depression. Also watch out for sudden changes in feelings such as feeling anxious, agitated, panicky, irritable, hostile, aggressive, impulsive, severely restless, overly excited and hyperactive, or not being able to sleep. If this happens, especially at the beginning of treatment or after a change in dose, call your healthcare provider.  You may get drowsy or dizzy. Do not drive, use machinery, or do anything that needs mental alertness until you know how this medicine affects you. Do not stand or sit up quickly, especially if you are an older patient. This reduces the risk of dizzy or fainting spells. Alcohol may interfere with the effect of this medicine. Avoid alcoholic drinks.  This medicine can cause an increase in blood pressure. This medicine can also cause a sudden drop in your blood pressure, which may make you feel faint and increase the chance of a fall. These effects are most common when you first start the medicine or when the dose is increased, or during use of other medicines that can cause a sudden drop in blood pressure. Check with your doctor for instructions on monitoring your blood pressure while taking  this medicine.  Your mouth may get dry. Chewing sugarless gum or sucking hard candy, and drinking plenty of water, may help. Contact your doctor if the problem does not go away or is severe.  What side effects may I notice from receiving this medicine?  Side effects that you should report to your doctor or health care professional as soon as possible:  · allergic reactions like skin rash, itching or hives, swelling of the face, lips, or tongue  · anxious  · breathing problems  · confusion  · changes in vision  · chest pain  · confusion  · elevated mood, decreased need for sleep, racing thoughts, impulsive behavior  · eye pain  · fast, irregular heartbeat  · feeling faint or lightheaded, falls  · feeling agitated, angry, or irritable  · hallucination, loss of contact with reality  · high blood pressure  · loss of balance or coordination  · palpitations  · redness, blistering, peeling or loosening of the skin, including inside the mouth  · restlessness, pacing, inability to keep still  · seizures  · stiff muscles  · suicidal thoughts or other mood changes  · trouble passing urine or change in the amount of urine  · trouble sleeping  · unusual bleeding or bruising  · unusually weak or tired  · vomiting  · yellowing of the eyes or skin  Side effects that usually do not require medical attention (report to your doctor or health care professional if they continue or are bothersome):  · change in sex drive or performance  · change in appetite or weight  · constipation  · dizziness  · dry mouth  · headache  · increased sweating  · nausea  · tired  This list may not describe all possible side effects. Call your doctor for medical advice about side effects. You may report side effects to FDA at 5-650-ZKA-5349.  Where should I keep my medicine?  Keep out of the reach of children and pets.  Store at room temperature between 15 and 30 degrees C (59 to 86 degrees F). Get rid of any unused medicine after the expiration date.  To  get rid of medicines that are no longer needed or have :  · Take the medicine to a medicine take-back program. Check with your pharmacy or law enforcement to find a location.  · If you cannot return the medicine, check the label or package insert to see if the medicine should be thrown out in the garbage or flushed down the toilet. If you are not sure, ask your health care provider. If it is safe to put it in the trash, take the medicine out of the container. Mix the medicine with cat litter, dirt, coffee grounds, or other unwanted substance. Seal the mixture in a bag or container. Put it in the trash.  NOTE: This sheet is a summary. It may not cover all possible information. If you have questions about this medicine, talk to your doctor, pharmacist, or health care provider.  ©  [employer] (2021 13:14:18)

## 2021-10-28 NOTE — PROGRESS NOTES
Injection  Injection performed in Left Deltoid by Catalina Ni MA. Patient tolerated the procedure well without complications.  10/28/21   Catalina Ni MA

## 2021-11-16 RX ORDER — CARVEDILOL 6.25 MG/1
6.25 TABLET ORAL 2 TIMES DAILY WITH MEALS
Qty: 60 TABLET | Refills: 0 | Status: SHIPPED | OUTPATIENT
Start: 2021-11-16 | End: 2021-12-19 | Stop reason: SDUPTHER

## 2021-11-16 NOTE — TELEPHONE ENCOUNTER
----- Message from Sergio Sandoval sent at 11/16/2021  7:30 AM CST -----  Regarding: Prescription renewal   I have to re-schedule that appointment as I will now be out of town next week

## 2021-12-20 RX ORDER — CARVEDILOL 6.25 MG/1
6.25 TABLET ORAL 2 TIMES DAILY WITH MEALS
Qty: 60 TABLET | Refills: 0 | Status: SHIPPED | OUTPATIENT
Start: 2021-12-20 | End: 2022-01-31 | Stop reason: SDUPTHER

## 2022-01-12 ENCOUNTER — OFFICE VISIT (OUTPATIENT)
Dept: FAMILY MEDICINE CLINIC | Facility: CLINIC | Age: 67
End: 2022-01-12

## 2022-01-12 VITALS
BODY MASS INDEX: 26.05 KG/M2 | RESPIRATION RATE: 18 BRPM | OXYGEN SATURATION: 98 % | TEMPERATURE: 98.3 F | SYSTOLIC BLOOD PRESSURE: 130 MMHG | WEIGHT: 182 LBS | HEIGHT: 70 IN | DIASTOLIC BLOOD PRESSURE: 78 MMHG | HEART RATE: 92 BPM

## 2022-01-12 DIAGNOSIS — M54.42 CHRONIC BILATERAL LOW BACK PAIN WITH BILATERAL SCIATICA: Chronic | ICD-10-CM

## 2022-01-12 DIAGNOSIS — M25.50 CHRONIC JOINT PAIN: Chronic | ICD-10-CM

## 2022-01-12 DIAGNOSIS — G89.29 CHRONIC JOINT PAIN: Chronic | ICD-10-CM

## 2022-01-12 DIAGNOSIS — G89.29 CHRONIC NECK PAIN: Primary | Chronic | ICD-10-CM

## 2022-01-12 DIAGNOSIS — G89.29 CHRONIC BILATERAL LOW BACK PAIN WITH BILATERAL SCIATICA: Chronic | ICD-10-CM

## 2022-01-12 DIAGNOSIS — M54.41 CHRONIC BILATERAL LOW BACK PAIN WITH BILATERAL SCIATICA: Chronic | ICD-10-CM

## 2022-01-12 DIAGNOSIS — M54.2 CHRONIC NECK PAIN: Primary | Chronic | ICD-10-CM

## 2022-01-12 PROCEDURE — 99215 OFFICE O/P EST HI 40 MIN: CPT | Performed by: NURSE PRACTITIONER

## 2022-01-12 RX ORDER — PREDNISONE 10 MG/1
TABLET ORAL
Qty: 10 TABLET | Refills: 0 | Status: SHIPPED | OUTPATIENT
Start: 2022-01-12 | End: 2022-03-04

## 2022-01-12 RX ORDER — DULOXETIN HYDROCHLORIDE 30 MG/1
30 CAPSULE, DELAYED RELEASE ORAL DAILY
Qty: 30 CAPSULE | Refills: 1 | Status: SHIPPED | OUTPATIENT
Start: 2022-01-12 | End: 2022-03-04 | Stop reason: SDUPTHER

## 2022-01-12 RX ORDER — GABAPENTIN 100 MG/1
100 CAPSULE ORAL NIGHTLY PRN
Qty: 30 CAPSULE | Refills: 1 | Status: SHIPPED | OUTPATIENT
Start: 2022-01-12 | End: 2022-03-04 | Stop reason: SDUPTHER

## 2022-01-12 NOTE — PROGRESS NOTES
"Chief Complaint  Neck Pain and Back Pain    Subjective          Sergio Sandoval presents to [facility] PRIMARY CARE - [facility]    PCP: Dr. Jorgensen    CC: \"neck pain, back pain\"    This is ongoing problem with neck and back pain for years.  Was seen for similar years ago in .  Hx of reported compression fracture in 2004 in lumbar spine.  Was previously taking NSAIDs that seemed to help, but has recently been taken off NSAIDS due to starting Eliquis for afib and also noted to have CKD.  Has been taking Tylenol, but not controlling pain.  Has previously spoken to PCP regarding ongoing pain, had autoimmune labs done which were negative.  Has missed some appointments due to having to reschedule or provider having to reschedule.  PCP currently out of office for the next several weeks.  Review of previous notes refills PCP plan to possibly start Cymbalta for chronic pain.  Patient also mentioned that they had discussed Gabapentin as he had taken this previously.  Had some increasing joint pain in wrists and knees with xrays that showed arthritis.  No recent neck/back xrays have been noted.  Has limited ROM of neck due to pain. Has pain in lumbar spine to bilateral hips down posterior legs to knees.  Has noted more weakness in legs and is causing him to stumble at times.  Has also noted that arms seems to be shaking more since pain has increased.  Denies numbness/tingling in legs.  Does report it is even difficult to sit on toilet due pain in back and legs.      Neck Pain   This is a chronic problem. The current episode started more than 1 year ago. The problem occurs daily. The problem has been gradually worsening. The pain is present in the midline. The pain is at a severity of 8/10. The symptoms are aggravated by position and bending. The pain is same all the time. Stiffness is present all day. Associated symptoms include headaches, leg pain and " "weakness. Pertinent negatives include no fever, numbness, pain with swallowing, paresis or tingling. He has tried acetaminophen (was taken off NSAIDs, currently on Acetaminophen only) for the symptoms. The treatment provided mild (taking multiple 650 mg tylenol per day with minimal relief) relief.   Back Pain  This is a chronic problem. The current episode started more than 1 year ago. The problem occurs daily. The problem has been gradually worsening since onset. The pain is present in the lumbar spine and sacro-iliac. The pain radiates to the left knee and right knee. The pain is at a severity of 8/10. The pain is the same all the time. The symptoms are aggravated by bending, position and sitting. Stiffness is present all day. Associated symptoms include headaches, leg pain and weakness. Pertinent negatives include no bladder incontinence, bowel incontinence, dysuria, fever, numbness, paresis, paresthesias or tingling. Treatments tried: currently tylenol only. The treatment provided mild relief.       Review of Systems   Constitutional: Negative for appetite change, fatigue, fever and unexpected weight change.   Respiratory: Negative.    Cardiovascular: Negative.    Gastrointestinal: Negative.  Negative for bowel incontinence.   Genitourinary: Positive for frequency ( on flomax). Negative for bladder incontinence, dysuria and flank pain.   Musculoskeletal: Positive for arthralgias (knees, wrists), back pain, neck pain and neck stiffness.   Skin: Negative.    Neurological: Positive for tremors (notcing that arms are somewhat shaky now), weakness and headaches. Negative for dizziness, tingling, numbness and paresthesias.        Objective   Vital Signs:   /78   Pulse 92   Temp 98.3 °F (36.8 °C)   Resp 18   Ht 177.8 cm (70\")   Wt 82.6 kg (182 lb)   SpO2 98%   BMI 26.11 kg/m²       Physical Exam  Vitals and nursing note reviewed.   Constitutional:       General: He is in acute distress (in noted " discomfort).      Appearance: Normal appearance. He is not ill-appearing.   HENT:      Head: Normocephalic and atraumatic.   Cardiovascular:      Rate and Rhythm: Normal rate. Rhythm irregular.   Pulmonary:      Effort: Pulmonary effort is normal. No respiratory distress.      Breath sounds: Normal breath sounds. No wheezing, rhonchi or rales.   Musculoskeletal:         General: Tenderness present.      Cervical back: Neck supple. Tenderness present. Pain with movement present. Decreased range of motion.      Lumbar back: Tenderness present. Decreased range of motion.   Skin:     General: Skin is warm and dry.   Neurological:      Mental Status: He is alert and oriented to person, place, and time.      Motor: Weakness ( generalized, but equal bilaterally in UE and LE) present.      Gait: Gait abnormal (slight shuffle noted with walking).   Psychiatric:         Mood and Affect: Mood normal.         Thought Content: Thought content normal.          Result Review :                   Recent Results (from the past 8736 hour(s))   CBC Auto Differential    Collection Time: 06/30/21  8:02 AM    Specimen: Blood   Result Value Ref Range    WBC 7.02 3.40 - 10.80 10*3/mm3    RBC 4.94 4.14 - 5.80 10*6/mm3    Hemoglobin 15.2 13.0 - 17.7 g/dL    Hematocrit 44.6 37.5 - 51.0 %    MCV 90.3 79.0 - 97.0 fL    MCH 30.8 26.6 - 33.0 pg    MCHC 34.1 31.5 - 35.7 g/dL    RDW 11.8 (L) 12.3 - 15.4 %    RDW-SD 38.2 37.0 - 54.0 fl    MPV 10.6 6.0 - 12.0 fL    Platelets 161 140 - 450 10*3/mm3    Neutrophil % 72.0 42.7 - 76.0 %    Lymphocyte % 18.5 (L) 19.6 - 45.3 %    Monocyte % 8.0 5.0 - 12.0 %    Eosinophil % 0.9 0.3 - 6.2 %    Basophil % 0.3 0.0 - 1.5 %    Immature Grans % 0.3 0.0 - 0.5 %    Neutrophils, Absolute 5.06 1.70 - 7.00 10*3/mm3    Lymphocytes, Absolute 1.30 0.70 - 3.10 10*3/mm3    Monocytes, Absolute 0.56 0.10 - 0.90 10*3/mm3    Eosinophils, Absolute 0.06 0.00 - 0.40 10*3/mm3    Basophils, Absolute 0.02 0.00 - 0.20 10*3/mm3     Immature Grans, Absolute 0.02 0.00 - 0.05 10*3/mm3    nRBC 0.0 0.0 - 0.2 /100 WBC   Comprehensive Metabolic Panel    Collection Time: 06/30/21  8:02 AM    Specimen: Blood   Result Value Ref Range    Glucose 108 (H) 65 - 99 mg/dL    BUN 17 8 - 23 mg/dL    Creatinine 1.39 (H) 0.76 - 1.27 mg/dL    Sodium 139 136 - 145 mmol/L    Potassium 4.3 3.5 - 5.2 mmol/L    Chloride 101 98 - 107 mmol/L    CO2 27.2 22.0 - 29.0 mmol/L    Calcium 9.3 8.6 - 10.5 mg/dL    Total Protein 6.9 6.0 - 8.5 g/dL    Albumin 4.40 3.50 - 5.20 g/dL    ALT (SGPT) 18 1 - 41 U/L    AST (SGOT) 24 1 - 40 U/L    Alkaline Phosphatase 63 39 - 117 U/L    Total Bilirubin 0.4 0.0 - 1.2 mg/dL    eGFR Non African Amer 51 (L) >60 mL/min/1.73    Globulin 2.5 gm/dL    A/G Ratio 1.8 g/dL    BUN/Creatinine Ratio 12.2 7.0 - 25.0    Anion Gap 10.8 5.0 - 15.0 mmol/L   Hemoglobin A1c    Collection Time: 06/30/21  8:02 AM    Specimen: Blood   Result Value Ref Range    Hemoglobin A1C 5.92 (H) 4.80 - 5.60 %   Lipid panel    Collection Time: 06/30/21  8:02 AM    Specimen: Blood   Result Value Ref Range    Total Cholesterol 138 0 - 200 mg/dL    Triglycerides 51 0 - 150 mg/dL    HDL Cholesterol 54 40 - 60 mg/dL    LDL Cholesterol  73 0 - 100 mg/dL    VLDL Cholesterol 11 5 - 40 mg/dL    LDL/HDL Ratio 1.37    Vitamin D 25 hydroxy    Collection Time: 06/30/21  8:02 AM    Specimen: Blood   Result Value Ref Range    25 Hydroxy, Vitamin D 64.0 ng/ml   Tissue Pathology Exam    Collection Time: 07/09/21 10:05 AM    Specimen: A: Back, Middle; Tissue    B: back2; Tissue   Result Value Ref Range    Case Report       Surgical Pathology Report                         Case: [id number]                                  Authorizing Provider:  Gabriel Quiñonez,   Collected:           07/09/2021 10:05 AM                                 MD                                                                           Ordering Location:     [facility]     Received:             07/12/2021 06:49 AM                                 [facility] GENERAL SURGERY                                                        Pathologist:           Murali Coreas MD                                                          Specimens:   1) - Back, Middle, cyst                                                                             2) - back2, right cyst                                                                     Final Diagnosis       SEE SCANNED REPORT       ECG 12 Lead    Collection Time: 09/07/21  8:00 AM   Result Value Ref Range    QT Interval 376 ms    QTC Interval 397 ms   Stress Test With Myocardial Perfusion One Day    Collection Time: 09/16/21 10:18 AM   Result Value Ref Range    Target HR (85%) 132 bpm    Max. Pred. HR (100%) 155 bpm    BH CV STRESS PROTOCOL 1 Pharmacologic     Stage 1 1     HR Stage 1 92     BP Stage 1 126/78     Duration Min Stage 1 0     Duration Sec Stage 1 10     Stress Dose Regadenoson Stage 1 0.4     Stress Comments Stage 1 10 sec bolus injection     Baseline  bpm    Baseline /91 mmHg    Peak  bpm    Percent Max Pred HR 64.52 %    Percent Target HR 76 %    Peak /91 mmHg    Recovery HR 85 bpm    Recovery /83 mmHg    Estimated workload 1.0 METS    BH CV REST NUCLEAR ISOTOPE DOSE 10.8 mCi    BH CV STRESS NUCLEAR ISOTOPE DOSE 35.0 mCi    Nuc Stress EF 67 %   CBC Auto Differential    Collection Time: 09/29/21  8:15 AM    Specimen: Blood   Result Value Ref Range    WBC 7.84 3.40 - 10.80 10*3/mm3    RBC 4.77 4.14 - 5.80 10*6/mm3    Hemoglobin 14.8 13.0 - 17.7 g/dL    Hematocrit 46.1 37.5 - 51.0 %    MCV 96.6 79.0 - 97.0 fL    MCH 31.0 26.6 - 33.0 pg    MCHC 32.1 31.5 - 35.7 g/dL    RDW 11.9 (L) 12.3 - 15.4 %    RDW-SD 43.0 37.0 - 54.0 fl    MPV 11.1 6.0 - 12.0 fL    Platelets 161 140 - 450 10*3/mm3    Neutrophil % 79.1 (H) 42.7 - 76.0 %    Lymphocyte % 13.8 (L) 19.6 - 45.3 %    Monocyte % 6.5 5.0 - 12.0 %    Eosinophil % 0.4 0.3 - 6.2 %     Basophil % 0.1 0.0 - 1.5 %    Neutrophils, Absolute 6.20 1.70 - 7.00 10*3/mm3    Lymphocytes, Absolute 1.08 0.70 - 3.10 10*3/mm3    Monocytes, Absolute 0.51 0.10 - 0.90 10*3/mm3    Eosinophils, Absolute 0.03 0.00 - 0.40 10*3/mm3    Basophils, Absolute 0.01 0.00 - 0.20 10*3/mm3   Comprehensive Metabolic Panel    Collection Time: 09/29/21  8:15 AM    Specimen: Blood   Result Value Ref Range    Glucose 101 (H) 65 - 99 mg/dL    BUN 18 8 - 23 mg/dL    Creatinine 1.23 0.76 - 1.27 mg/dL    Sodium 143 136 - 145 mmol/L    Potassium 4.2 3.5 - 5.2 mmol/L    Chloride 105 98 - 107 mmol/L    CO2 27.8 22.0 - 29.0 mmol/L    Calcium 9.7 8.6 - 10.5 mg/dL    Total Protein 6.8 6.0 - 8.5 g/dL    Albumin 4.60 3.50 - 5.20 g/dL    ALT (SGPT) 19 1 - 41 U/L    AST (SGOT) 23 1 - 40 U/L    Alkaline Phosphatase 49 39 - 117 U/L    Total Bilirubin 0.8 0.0 - 1.2 mg/dL    eGFR Non African Amer 59 (L) >60 mL/min/1.73    Globulin 2.2 gm/dL    A/G Ratio 2.1 g/dL    BUN/Creatinine Ratio 14.6 7.0 - 25.0    Anion Gap 10.2 5.0 - 15.0 mmol/L   Hemoglobin A1c    Collection Time: 09/29/21  8:15 AM    Specimen: Blood   Result Value Ref Range    Hemoglobin A1C 5.50 4.80 - 5.60 %   Lipid Panel    Collection Time: 09/29/21  8:15 AM    Specimen: Blood   Result Value Ref Range    Total Cholesterol 135 0 - 200 mg/dL    Triglycerides 52 0 - 150 mg/dL    HDL Cholesterol 57 40 - 60 mg/dL    LDL Cholesterol  66 0 - 100 mg/dL    VLDL Cholesterol 12 5 - 40 mg/dL    LDL/HDL Ratio 1.19    Vitamin D 25 Hydroxy    Collection Time: 09/29/21  8:15 AM    Specimen: Blood   Result Value Ref Range    25 Hydroxy, Vitamin D 53.6 ng/ml   Vitamin B12    Collection Time: 09/29/21  8:15 AM    Specimen: Blood   Result Value Ref Range    Vitamin B-12 623 211 - 946 pg/mL   POP    Collection Time: 09/29/21  8:15 AM    Specimen: Blood   Result Value Ref Range    POP Direct Negative Negative   Rheumatoid Factor    Collection Time: 09/29/21  8:15 AM    Specimen: Blood   Result Value Ref Range     Rheumatoid Factor Quantitative <10.0 0.0 - 14.0 IU/mL   Sedimentation Rate    Collection Time: 09/29/21  8:15 AM    Specimen: Blood   Result Value Ref Range    Sed Rate 4 0 - 20 mm/hr   C-reactive Protein    Collection Time: 09/29/21  8:15 AM    Specimen: Blood   Result Value Ref Range    C-Reactive Protein <0.30 0.00 - 0.50 mg/dL   POP Comprehensive Panel    Collection Time: 09/29/21  8:15 AM    Specimen: Blood   Result Value Ref Range    Anti-DNA (DS) Ab Qn 8 0 - 9 IU/mL    RNP Antibodies 0.2 0.0 - 0.9 AI    Mcgowan Antibodies <0.2 0.0 - 0.9 AI    Antiscleroderma-70 Antibodies <0.2 0.0 - 0.9 AI    GLORIA SSA (RO) Ab <0.2 0.0 - 0.9 AI    GLORIA SSB (LA) Ab <0.2 0.0 - 0.9 AI    Antichromatin Antibodies <0.2 0.0 - 0.9 AI    DEJA-1 IgG <0.2 0.0 - 0.9 AI    Anti-Centromere B Antibodies <0.2 0.0 - 0.9 AI    See below: Comment    Uric Acid    Collection Time: 09/29/21  8:15 AM    Specimen: Blood   Result Value Ref Range    Uric Acid 6.3 3.4 - 7.0 mg/dL   HLA-B27 Antigen    Collection Time: 09/29/21  8:15 AM    Specimen: Blood   Result Value Ref Range    HLA B27 Negative    Cyclic Citrul Peptide Antibody, IgG / IgA    Collection Time: 09/29/21  8:15 AM    Specimen: Blood   Result Value Ref Range    CCP Antibodies IgG/IgA 5 0 - 19 units   Phosphorus    Collection Time: 09/29/21  8:15 AM    Specimen: Blood   Result Value Ref Range    Phosphorus 3.2 2.5 - 4.5 mg/dL   Adult Transthoracic Echo Complete w/ Color, Spectral and Contrast if Necessary Per Protocol    Collection Time: 10/01/21  8:42 AM   Result Value Ref Range    BSA 2.0 m^2    RVIDd 2.9 cm    IVSd 1.5 cm    LVIDd 5.6 cm    LVIDs 3.9 cm    LVPWd 1.5 cm    IVS/LVPW 0.95     FS 30.8 %    EDV(Teich) 154.9 ml    ESV(Teich) 65.5 ml    EF(Teich) 57.7 %    EDV(cubed) 177.5 ml    ESV(cubed) 58.9 ml    EF(cubed) 66.8 %    LV mass(C)d 385.8 grams    LV mass(C)dI 191.5 grams/m^2    SV(Teich) 89.4 ml    SI(Teich) 44.4 ml/m^2    SV(cubed) 118.6 ml    SI(cubed) 58.9 ml/m^2    EPSS 0.5  cm    Ao root diam 3.2 cm    Ao root area 8.0 cm^2    ACS 2.4 cm    LA dimension 3.3 cm    asc Aorta Diam 3.8 cm    LA/Ao 1.0     LVOT diam 2.3 cm    LVOT area 4.2 cm^2    LVOT area(traced) 4.2 cm^2    LVLd ap4 7.6 cm    EDV(MOD-sp4) 74.7 ml    LVLs ap4 6.5 cm    ESV(MOD-sp4) 33.6 ml    EF(MOD-sp4) 55.0 %    LVLd ap2 7.1 cm    EDV(MOD-sp2) 66.6 ml    LVLs ap2 6.5 cm    ESV(MOD-sp2) 33.0 ml    EF(MOD-sp2) 50.5 %    SV(MOD-sp4) 41.1 ml    SI(MOD-sp4) 20.4 ml/m^2    SV(MOD-sp2) 33.6 ml    SI(MOD-sp2) 16.7 ml/m^2    Ao root area (BSA corrected) 1.6     LV Gibson Vol (BSA corrected) 37.1 ml/m^2    LV Sys Vol (BSA corrected) 16.7 ml/m^2    MV E max denisse 91.7 cm/sec    MV A max denisse 28.1 cm/sec    MV E/A 3.3     MV V2 max 85.6 cm/sec    MV max PG 2.9 mmHg    MV V2 mean 47.5 cm/sec    MV mean PG 1.0 mmHg    MV V2 VTI 16.0 cm    MVA(VTI) 3.3 cm^2    MV P1/2t max denisse 89.3 cm/sec    MV P1/2t 41.3 msec    MVA(P1/2t) 5.3 cm^2    MV dec slope 633.0 cm/sec^2    Ao pk denisse 103.0 cm/sec    Ao max PG 4.2 mmHg    Ao max PG (full) 2.0 mmHg    Ao V2 mean 68.3 cm/sec    Ao mean PG 2.0 mmHg    Ao mean PG (full) 1.0 mmHg    Ao V2 VTI 18.2 cm    DAJUAN(I,A) 2.9 cm^2    DAJUAN(I,D) 2.9 cm^2    DAJUAN(V,A) 3.0 cm^2    DAJUAN(V,D) 3.0 cm^2    LV V1 max PG 2.2 mmHg    LV V1 mean PG 1.0 mmHg    LV V1 max 74.6 cm/sec    LV V1 mean 53.9 cm/sec    LV V1 VTI 12.7 cm    MR max denisse 271.0 cm/sec    MR max PG 29.4 mmHg    SV(Ao) 146.4 ml    SI(Ao) 72.7 ml/m^2    SV(LVOT) 52.8 ml    SI(LVOT) 26.2 ml/m^2    PA V2 max 87.5 cm/sec    PA max PG 3.1 mmHg    PA max PG (full) 2.2 mmHg    PA V2 mean 58.5 cm/sec    PA mean PG 2.0 mmHg    PA mean PG (full) 2.0 mmHg    PA V2 VTI 15.2 cm    PI end-d denisse 149.0 cm/sec    RV V1 max PG 0.86 mmHg    RV V1 mean PG 0 mmHg    RV V1 max 46.3 cm/sec    RV V1 mean 29.0 cm/sec    RV V1 VTI 10.0 cm    TR max denisse 179.0 cm/sec    RVSP(TR) 15.8 mmHg    RAP systole 3.0 mmHg    MVA P1/2T LCG 2.5 cm^2    BH CV ECHO PADILLA - BZI_BMI 26.4 kilograms/m^2      CV ECHO PADILLA - BSA(HarlingenCOCK) 2.0 m^2     CV ECHO PADILLA - BZI_METRIC_WEIGHT 83.5 kg     CV ECHO PADILLA - BZI_METRIC_HEIGHT 177.8 cm     CV VAS BP LEFT /84 mmHg    Echo EF Estimated 57 %     EXAMINATION:  XR KNEE 4+ VW BILATERAL     CLINICAL HISTORY:  66 years Male,bilateral knee pain, M25.561  Pain in right knee M25.562 Pain in left knee     COMPARISON:  None     FINDINGS:    The right knee is negative for acute fracture. No dislocation.  Joint spaces are largely preserved about the right knee with very  small tricompartmental marginal hypertrophic osteophytes. No  right knee effusion.     The left knee is negative for acute fracture. No dislocation.  Joint spaces are largely preserved about the left knee with very  small tricompartmental marginal hypertrophic osteophytes. No left  knee effusion.     IMPRESSION:  Very mild degenerative arthritis about both knees,  as above, without acute bony abnormality.     Electronically signed by:  David Rocha MD  9/29/2021 1:26 PM [employer]  Workstation: [id number]       EXAMINATION:  XR WRIST 3+ VW BILATERAL     CLINICAL HISTORY:  66 years Male,bilateral wrist pain, M25.561  Pain in right knee M25.562 Pain in left knee M25.531 Pain in  right wrist M25.532 Pain in left wrist     COMPARISON:  None     FINDINGS:    The right wrist is negative for acute fracture. No dislocation. A  small osseous density adjacent to the ulnar solid process appears  well-corticated and suggesting sequelae of remote injury. No  radiopaque foreign body. No significant degenerative change.     The left wrist is negative for acute fracture. No dislocation. No  radiopaque foreign body. No significant degenerative change.     IMPRESSION:  No acute bony abnormality involving either wrist.     Electronically signed by:  David Rocha MD  9/29/2021 1:27 PM [employer]  Workstation: [id number]     Assessment and Plan    Diagnoses and all orders for this visit:    1. Chronic neck pain (Primary)  -      predniSONE (DELTASONE) 10 MG tablet; 2 tabs po daily x 3 days, then 1 tab po daily x 4 days  Dispense: 10 tablet; Refill: 0  -     DULoxetine (Cymbalta) 30 MG capsule; Take 1 capsule by mouth Daily.  Dispense: 30 capsule; Refill: 1  -     XR Spine Cervical Complete 4 or 5 View    2. Chronic joint pain  -     predniSONE (DELTASONE) 10 MG tablet; 2 tabs po daily x 3 days, then 1 tab po daily x 4 days  Dispense: 10 tablet; Refill: 0  -     DULoxetine (Cymbalta) 30 MG capsule; Take 1 capsule by mouth Daily.  Dispense: 30 capsule; Refill: 1    3. Chronic bilateral low back pain with bilateral sciatica  -     predniSONE (DELTASONE) 10 MG tablet; 2 tabs po daily x 3 days, then 1 tab po daily x 4 days  Dispense: 10 tablet; Refill: 0  -     gabapentin (NEURONTIN) 100 MG capsule; Take 1 capsule by mouth At Night As Needed (pain).  Dispense: 30 capsule; Refill: 1  -     DULoxetine (Cymbalta) 30 MG capsule; Take 1 capsule by mouth Daily.  Dispense: 30 capsule; Refill: 1  -     XR Spine Lumbar 4+ View      Cervical and lumbar xrays today.   Will start Cymbalta daily.   Rx for Gabapentin at night PRN.   Continue with Tylenol PRN.   Has f/u with PCP in March.   Will need to continue to monitor shaking, shuffling gait, if no improvement, may need to consider neurology referral.      See PCP or RTC if symptoms persist/worsen  See PCP for routine f/u visit and management of chronic medical conditions      This document has been electronically signed by ERIN Hopkins on January 12, 2022 18:45 CST,.    I spent 42 minutes caring for Sergio on this date of service. This time includes time spent by me in the following activities:preparing for the visit, reviewing tests, performing a medically appropriate examination and/or evaluation , counseling and educating the patient/family/caregiver, ordering medications, tests, or procedures and documenting information in the medical record

## 2022-01-24 ENCOUNTER — TRANSCRIBE ORDERS (OUTPATIENT)
Dept: LAB | Facility: HOSPITAL | Age: 67
End: 2022-01-24

## 2022-01-24 ENCOUNTER — LAB (OUTPATIENT)
Dept: LAB | Facility: HOSPITAL | Age: 67
End: 2022-01-24

## 2022-01-24 DIAGNOSIS — D50.8 OTHER IRON DEFICIENCY ANEMIA: ICD-10-CM

## 2022-01-24 DIAGNOSIS — D50.8 OTHER IRON DEFICIENCY ANEMIA: Primary | ICD-10-CM

## 2022-01-24 LAB
ALBUMIN SERPL-MCNC: 4.1 G/DL (ref 3.5–5.2)
ANION GAP SERPL CALCULATED.3IONS-SCNC: 7.7 MMOL/L (ref 5–15)
BACTERIA UR QL AUTO: NORMAL /HPF
BILIRUB UR QL STRIP: NEGATIVE
BUN SERPL-MCNC: 14 MG/DL (ref 8–23)
BUN/CREAT SERPL: 13.7 (ref 7–25)
CALCIUM SPEC-SCNC: 9.7 MG/DL (ref 8.6–10.5)
CHLORIDE SERPL-SCNC: 100 MMOL/L (ref 98–107)
CLARITY UR: CLEAR
CO2 SERPL-SCNC: 31.3 MMOL/L (ref 22–29)
COLOR UR: YELLOW
CREAT SERPL-MCNC: 1.02 MG/DL (ref 0.76–1.27)
CREAT UR-MCNC: 126.1 MG/DL
DEPRECATED RDW RBC AUTO: 38.9 FL (ref 37–54)
ERYTHROCYTE [DISTWIDTH] IN BLOOD BY AUTOMATED COUNT: 11.9 % (ref 12.3–15.4)
GFR SERPL CREATININE-BSD FRML MDRD: 73 ML/MIN/1.73
GLUCOSE SERPL-MCNC: 116 MG/DL (ref 65–99)
GLUCOSE UR STRIP-MCNC: NEGATIVE MG/DL
HCT VFR BLD AUTO: 45.8 % (ref 37.5–51)
HGB BLD-MCNC: 14.9 G/DL (ref 13–17.7)
HGB UR QL STRIP.AUTO: NEGATIVE
HYALINE CASTS UR QL AUTO: NORMAL /LPF
KETONES UR QL STRIP: NEGATIVE
LEUKOCYTE ESTERASE UR QL STRIP.AUTO: NEGATIVE
MAGNESIUM SERPL-MCNC: 2.4 MG/DL (ref 1.6–2.4)
MCH RBC QN AUTO: 29.6 PG (ref 26.6–33)
MCHC RBC AUTO-ENTMCNC: 32.5 G/DL (ref 31.5–35.7)
MCV RBC AUTO: 91.1 FL (ref 79–97)
NITRITE UR QL STRIP: NEGATIVE
PH UR STRIP.AUTO: 6.5 [PH] (ref 5–8)
PHOSPHATE SERPL-MCNC: 3.6 MG/DL (ref 2.5–4.5)
PLATELET # BLD AUTO: 186 10*3/MM3 (ref 140–450)
PMV BLD AUTO: 10.5 FL (ref 6–12)
POTASSIUM SERPL-SCNC: 5 MMOL/L (ref 3.5–5.2)
PROT ?TM UR-MCNC: 11 MG/DL
PROT UR QL STRIP: NEGATIVE
PROT/CREAT UR: 87.2 MG/G CREA (ref 0–200)
RBC # BLD AUTO: 5.03 10*6/MM3 (ref 4.14–5.8)
RBC # UR STRIP: NORMAL /HPF
REF LAB TEST METHOD: NORMAL
SODIUM SERPL-SCNC: 139 MMOL/L (ref 136–145)
SP GR UR STRIP: 1.02 (ref 1–1.03)
SQUAMOUS #/AREA URNS HPF: NORMAL /HPF
URATE SERPL-MCNC: 6.5 MG/DL (ref 3.4–7)
UROBILINOGEN UR QL STRIP: NORMAL
WBC # UR STRIP: NORMAL /HPF
WBC NRBC COR # BLD: 12.63 10*3/MM3 (ref 3.4–10.8)

## 2022-01-24 PROCEDURE — 81001 URINALYSIS AUTO W/SCOPE: CPT

## 2022-01-24 PROCEDURE — 80069 RENAL FUNCTION PANEL: CPT

## 2022-01-24 PROCEDURE — 83735 ASSAY OF MAGNESIUM: CPT

## 2022-01-24 PROCEDURE — 82570 ASSAY OF URINE CREATININE: CPT

## 2022-01-24 PROCEDURE — 84156 ASSAY OF PROTEIN URINE: CPT

## 2022-01-24 PROCEDURE — 84550 ASSAY OF BLOOD/URIC ACID: CPT

## 2022-01-24 PROCEDURE — 85027 COMPLETE CBC AUTOMATED: CPT

## 2022-01-31 RX ORDER — CARVEDILOL 6.25 MG/1
6.25 TABLET ORAL 2 TIMES DAILY WITH MEALS
Qty: 60 TABLET | Refills: 0 | Status: SHIPPED | OUTPATIENT
Start: 2022-01-31 | End: 2022-03-04 | Stop reason: SDUPTHER

## 2022-01-31 NOTE — TELEPHONE ENCOUNTER
----- Message from Sergio Sandoval sent at 1/29/2022 11:18 PM CST -----  Regarding: prescription request  my carvedilol 6.5 MG is running low and no more refills

## 2022-02-18 ENCOUNTER — TELEPHONE (OUTPATIENT)
Dept: FAMILY MEDICINE CLINIC | Facility: CLINIC | Age: 67
End: 2022-02-18

## 2022-02-22 RX ORDER — TELMISARTAN AND HYDROCHLORTHIAZIDE 40; 12.5 MG/1; MG/1
1 TABLET ORAL DAILY
Qty: 30 TABLET | Refills: 0 | Status: SHIPPED | OUTPATIENT
Start: 2022-02-22 | End: 2022-03-04 | Stop reason: SDUPTHER

## 2022-03-04 ENCOUNTER — OFFICE VISIT (OUTPATIENT)
Dept: FAMILY MEDICINE CLINIC | Facility: CLINIC | Age: 67
End: 2022-03-04

## 2022-03-04 VITALS
SYSTOLIC BLOOD PRESSURE: 120 MMHG | WEIGHT: 185.2 LBS | HEART RATE: 112 BPM | TEMPERATURE: 97.1 F | HEIGHT: 70 IN | DIASTOLIC BLOOD PRESSURE: 82 MMHG | OXYGEN SATURATION: 97 % | BODY MASS INDEX: 26.51 KG/M2

## 2022-03-04 DIAGNOSIS — K21.9 GASTROESOPHAGEAL REFLUX DISEASE, UNSPECIFIED WHETHER ESOPHAGITIS PRESENT: ICD-10-CM

## 2022-03-04 DIAGNOSIS — G89.29 CHRONIC JOINT PAIN: Chronic | ICD-10-CM

## 2022-03-04 DIAGNOSIS — M25.50 CHRONIC JOINT PAIN: Chronic | ICD-10-CM

## 2022-03-04 DIAGNOSIS — E78.2 MIXED HYPERLIPIDEMIA: ICD-10-CM

## 2022-03-04 DIAGNOSIS — G89.29 CHRONIC NECK PAIN: Chronic | ICD-10-CM

## 2022-03-04 DIAGNOSIS — M54.41 CHRONIC BILATERAL LOW BACK PAIN WITH BILATERAL SCIATICA: Chronic | ICD-10-CM

## 2022-03-04 DIAGNOSIS — M54.2 CHRONIC NECK PAIN: Chronic | ICD-10-CM

## 2022-03-04 DIAGNOSIS — M54.42 CHRONIC BILATERAL LOW BACK PAIN WITH BILATERAL SCIATICA: Chronic | ICD-10-CM

## 2022-03-04 DIAGNOSIS — I10 ESSENTIAL HYPERTENSION: Primary | ICD-10-CM

## 2022-03-04 DIAGNOSIS — G89.29 CHRONIC BILATERAL LOW BACK PAIN WITH BILATERAL SCIATICA: Chronic | ICD-10-CM

## 2022-03-04 DIAGNOSIS — N40.1 BENIGN PROSTATIC HYPERPLASIA WITH LOWER URINARY TRACT SYMPTOMS, SYMPTOM DETAILS UNSPECIFIED: ICD-10-CM

## 2022-03-04 PROCEDURE — 99214 OFFICE O/P EST MOD 30 MIN: CPT | Performed by: FAMILY MEDICINE

## 2022-03-04 RX ORDER — TELMISARTAN AND HYDROCHLORTHIAZIDE 40; 12.5 MG/1; MG/1
1 TABLET ORAL DAILY
Qty: 90 TABLET | Refills: 1 | Status: SHIPPED | OUTPATIENT
Start: 2022-03-04 | End: 2022-08-05 | Stop reason: SDUPTHER

## 2022-03-04 RX ORDER — DULOXETIN HYDROCHLORIDE 30 MG/1
30 CAPSULE, DELAYED RELEASE ORAL DAILY
Qty: 30 CAPSULE | Refills: 1 | Status: SHIPPED | OUTPATIENT
Start: 2022-03-04 | End: 2022-03-14 | Stop reason: SDUPTHER

## 2022-03-04 RX ORDER — TAMSULOSIN HYDROCHLORIDE 0.4 MG/1
1 CAPSULE ORAL DAILY
Qty: 90 CAPSULE | Refills: 1 | Status: SHIPPED | OUTPATIENT
Start: 2022-03-04 | End: 2022-06-20 | Stop reason: SDUPTHER

## 2022-03-04 RX ORDER — SIMVASTATIN 20 MG
20 TABLET ORAL NIGHTLY
Qty: 90 TABLET | Refills: 1 | Status: SHIPPED | OUTPATIENT
Start: 2022-03-04 | End: 2022-08-05 | Stop reason: SDUPTHER

## 2022-03-04 RX ORDER — GABAPENTIN 100 MG/1
100 CAPSULE ORAL NIGHTLY PRN
Qty: 90 CAPSULE | Refills: 1 | Status: SHIPPED | OUTPATIENT
Start: 2022-03-04 | End: 2022-08-05 | Stop reason: SDUPTHER

## 2022-03-04 RX ORDER — CARVEDILOL 6.25 MG/1
6.25 TABLET ORAL 2 TIMES DAILY WITH MEALS
Qty: 180 TABLET | Refills: 1 | Status: SHIPPED | OUTPATIENT
Start: 2022-03-04 | End: 2022-03-07

## 2022-03-04 NOTE — PROGRESS NOTES
Answers for HPI/ROS submitted by the patient on 2/26/2022  What is the primary reason for your visit?: Other  Please describe your symptoms.: Renew of perscriptions  Have you had these symptoms before?: Yes  How long have you been having these symptoms?: Greater than 2 weeks

## 2022-03-04 NOTE — PROGRESS NOTES
"Chief Complaint  Hypertension, Hyperlipidemia, Atrial Fibrillation, and Heartburn    Subjective          Review of Systems   Constitutional: Negative for appetite change and diaphoresis.   HENT: Negative.    Eyes: Negative.    Respiratory: Negative.    Cardiovascular: Positive for palpitations.        Exercises routinely   Gastrointestinal: Negative.    Genitourinary:        Bph   Musculoskeletal: Positive for arthralgias.        Bilateral sciatica   Skin: Negative.    Neurological: Positive for tremors.       Sergio Sandoval presents to [facility] - [place]  Hypertension  This is a chronic problem. The current episode started more than 1 year ago. The problem is controlled. Associated symptoms include palpitations. Risk factors for coronary artery disease include dyslipidemia. Past treatments include angiotensin blockers, beta blockers and diuretics. Current antihypertension treatment includes angiotensin blockers, diuretics and beta blockers. The current treatment provides significant improvement.   Hyperlipidemia  This is a chronic problem. The current episode started more than 1 year ago. Current antihyperlipidemic treatment includes statins. The current treatment provides moderate improvement of lipids.   Atrial Fibrillation  Presents for follow-up visit. Symptoms include palpitations. Past medical history includes atrial fibrillation and hyperlipidemia.   Heartburn  He has tried a histamine-2 antagonist for the symptoms. The treatment provided moderate relief.       Objective   Vital Signs:   /82 (BP Location: Right arm, Patient Position: Sitting, Cuff Size: Adult)   Pulse 112   Temp 97.1 °F (36.2 °C) (Temporal)   Ht 177.8 cm (70\")   Wt 84 kg (185 lb 3.2 oz)   SpO2 97%   BMI 26.57 kg/m²     Physical Exam  Vitals and nursing note reviewed.   Constitutional:       Appearance: He is well-developed. He is not diaphoretic.   HENT:      Head: Normocephalic " and atraumatic.      Right Ear: Tympanic membrane, ear canal and external ear normal.      Left Ear: Tympanic membrane, ear canal and external ear normal.   Eyes:      Extraocular Movements: Extraocular movements intact.      Conjunctiva/sclera: Conjunctivae normal.      Pupils: Pupils are equal, round, and reactive to light.   Cardiovascular:      Rate and Rhythm: Normal rate. Rhythm irregular.      Heart sounds: Normal heart sounds. No murmur heard.  Pulmonary:      Effort: Pulmonary effort is normal. No respiratory distress.      Breath sounds: Normal breath sounds.   Abdominal:      General: Bowel sounds are normal. There is no distension.      Palpations: Abdomen is soft.      Tenderness: There is no abdominal tenderness. There is no right CVA tenderness or left CVA tenderness.   Musculoskeletal:         General: Tenderness present. No deformity.      Right wrist: Tenderness and bony tenderness present. Decreased range of motion.      Left wrist: Tenderness and bony tenderness present. Decreased range of motion.      Cervical back: Normal range of motion and neck supple.      Right knee: Bony tenderness present. Decreased range of motion. Tenderness present.      Left knee: Bony tenderness present. Decreased range of motion. Tenderness present.   Skin:     General: Skin is warm.      Capillary Refill: Capillary refill takes 2 to 3 seconds.      Coloration: Skin is not jaundiced or pale.      Findings: No bruising, erythema, lesion or rash.   Neurological:      Mental Status: He is alert and oriented to person, place, and time.      Cranial Nerves: No cranial nerve deficit.   Psychiatric:         Mood and Affect: Mood normal.         Behavior: Behavior normal.         Thought Content: Thought content normal.         Judgment: Judgment normal.        Result Review :                 Assessment and Plan    Diagnoses and all orders for this visit:    1. Essential hypertension (Primary)  -     carvedilol (COREG) 6.25  MG tablet; Take 1 tablet by mouth 2 (Two) Times a Day With Meals.  Dispense: 180 tablet; Refill: 1  -     telmisartan-hydrochlorothiazide (MICARDIS HCT) 40-12.5 MG per tablet; Take 1 tablet by mouth Daily.  Dispense: 90 tablet; Refill: 1    2. Chronic bilateral low back pain with bilateral sciatica  -     gabapentin (NEURONTIN) 100 MG capsule; Take 1 capsule by mouth At Night As Needed (pain) for up to 90 days.  Dispense: 90 capsule; Refill: 1  -     DULoxetine (Cymbalta) 30 MG capsule; Take 1 capsule by mouth Daily.  Dispense: 30 capsule; Refill: 1    3. Chronic neck pain  -     gabapentin (NEURONTIN) 100 MG capsule; Take 1 capsule by mouth At Night As Needed (pain) for up to 90 days.  Dispense: 90 capsule; Refill: 1  -     DULoxetine (Cymbalta) 30 MG capsule; Take 1 capsule by mouth Daily.  Dispense: 30 capsule; Refill: 1    4. Chronic joint pain  -     DULoxetine (Cymbalta) 30 MG capsule; Take 1 capsule by mouth Daily.  Dispense: 30 capsule; Refill: 1    5. Benign prostatic hyperplasia with lower urinary tract symptoms, symptom details unspecified  -     tamsulosin (Flomax) 0.4 MG capsule 24 hr capsule; Take 1 capsule by mouth Daily.  Dispense: 90 capsule; Refill: 1    6. Gastroesophageal reflux disease, unspecified whether esophagitis present  -     famotidine (Pepcid) 20 MG tablet; Take 1 tablet by mouth 2 (Two) Times a Day.  Dispense: 180 tablet; Refill: 0    7. Mixed hyperlipidemia  -     simvastatin (Zocor) 20 MG tablet; Take 1 tablet by mouth Every Night.  Dispense: 90 tablet; Refill: 1        Follow Up   Return in about 3 months (around 6/4/2022).  Patient was given instructions and counseling regarding his condition or for health maintenance advice. Please see specific information pulled into the AVS if appropriate.       Answers for HPI/ROS submitted by the patient on 2/26/2022  What is the primary reason for your visit?: Other  Please describe your symptoms.: Renew of perscriptions  Have you had these  symptoms before?: Yes  How long have you been having these symptoms?: Greater than 2 weeks        This document has been electronically signed by Klaus Gracia MD

## 2022-03-05 RX ORDER — FAMOTIDINE 20 MG/1
20 TABLET, FILM COATED ORAL 2 TIMES DAILY
Qty: 180 TABLET | Refills: 0 | Status: SHIPPED | OUTPATIENT
Start: 2022-03-05 | End: 2022-06-10 | Stop reason: SDUPTHER

## 2022-03-07 ENCOUNTER — OFFICE VISIT (OUTPATIENT)
Dept: CARDIOLOGY | Facility: CLINIC | Age: 67
End: 2022-03-07

## 2022-03-07 VITALS
HEIGHT: 70 IN | OXYGEN SATURATION: 97 % | TEMPERATURE: 98.4 F | HEART RATE: 95 BPM | SYSTOLIC BLOOD PRESSURE: 124 MMHG | DIASTOLIC BLOOD PRESSURE: 78 MMHG | WEIGHT: 186 LBS | BODY MASS INDEX: 26.63 KG/M2

## 2022-03-07 DIAGNOSIS — I10 ESSENTIAL HYPERTENSION: ICD-10-CM

## 2022-03-07 DIAGNOSIS — E78.2 MIXED HYPERLIPIDEMIA: ICD-10-CM

## 2022-03-07 DIAGNOSIS — I48.91 ATRIAL FIBRILLATION, CONTROLLED: Primary | ICD-10-CM

## 2022-03-07 DIAGNOSIS — D69.6 THROMBOCYTOPENIA: ICD-10-CM

## 2022-03-07 PROCEDURE — 99214 OFFICE O/P EST MOD 30 MIN: CPT | Performed by: INTERNAL MEDICINE

## 2022-03-07 RX ORDER — METOPROLOL SUCCINATE 50 MG/1
50 TABLET, EXTENDED RELEASE ORAL DAILY
Qty: 90 TABLET | Refills: 3 | Status: SHIPPED | OUTPATIENT
Start: 2022-03-07 | End: 2022-08-05 | Stop reason: SDUPTHER

## 2022-03-07 NOTE — PROGRESS NOTES
Sergio Sandoval  66 y.o. male      1. Atrial fibrillation, controlled (HCC)    2. Essential hypertension    3. Mixed hyperlipidemia    4. Thrombocytopenia (HCC)        History of Present Illness  Sergio Sandoval is a 66-year-old male who was seen by me for the first time back in September 2021.  He has a long and complex cardiac history.  It appears that he was diagnosed to have supraventricular tachycardia when he was stationed in [place] in the late 1990s.  He was treated with beta-blockers and later in 1998 while in [place] appears to have undergone ablation for either SVT or atrial fibrillation.  Subsequently he was noted to have atrial fibrillation and underwent cryoablation in 2012.  An MRI report from 2012 was available for review and the results are as follows:     1. Atrial fibrillation, status post pulmonary vein isolation.      2. Normal left ventricular chamber size and systolic function. LVEF      56%.      3. Normal right ventricular chamber size and systolic function.      4. No significant valvular abnormalities.      5. Normal pulmonary vein anatomy; left superior 14 mm, left inferior 15      mm, right superior 8 mm, right inferior 21 mm (including small      accessory vein).        His other medical issues include back pain, degenerative joint disease, hypertension, borderline diabetes/prediabetes, depression.  He has no previous documented coronary artery disease or valvular heart disease.    Lexiscan Cardiolite stress test in September 2021 showed:  · Findings consistent with an equivocal ECG stress test.  · Left ventricular ejection fraction is normal. (Calculated EF = 67%).  · Myocardial perfusion imaging indicates a normal myocardial perfusion study with no evidence of ischemia.  · Impressions are consistent with a low risk study.    Echocardiogram in October 2021 showed:  · Left ventricular wall thickness is consistent with moderate concentric hypertrophy.  · Estimated left ventricular EF =  57% Left ventricular ejection fraction appears to be 56 - 60%. Left ventricular systolic function is normal.  · Estimated right ventricular systolic pressure from tricuspid regurgitation is normal (<35 mmHg).    The patient denied any cardiac symptoms such as chest pain, shortness of breath or palpitation but has noted that his heart rate is a little elevated and closer to 90 since we discontinued flecainide the last time.  He was maintained on rate control with carvedilol and anticoagulation with Eliquis along with antihypertensive therapy.  His predominant symptoms relate to arthritis.  He was diagnosed to have osteoarthritis and not rheumatoid.    No Known Allergies      Past Medical History:   Diagnosis Date   • Atrial fibrillation (HCC)    • DDD (degenerative disc disease), thoracic    • Hyperlipidemia    • Hypertension    • Lipoma of skin          Past Surgical History:   Procedure Laterality Date   • CARDIAC ABLATION     • COLONOSCOPY     • SHOULDER SURGERY     • UPPER GASTROINTESTINAL ENDOSCOPY      with esophageal dilation   • WISDOM TOOTH EXTRACTION           Family History   Problem Relation Age of Onset   • Arthritis Mother    • Heart disease Mother    • Rheumatic fever Mother    • Hyperlipidemia Father    • Hypertension Father    • Diabetes Father    • Stroke Father    • Prostate cancer Father    • Skin cancer Father    • Cancer Sister    • Alcohol abuse Brother    • No Known Problems Daughter    • No Known Problems Son    • Prostate cancer Paternal Grandfather    • No Known Problems Sister          Social History     Socioeconomic History   • Marital status:    Tobacco Use   • Smoking status: Never Smoker   • Smokeless tobacco: Never Used   Vaping Use   • Vaping Use: Never used   Substance and Sexual Activity   • Alcohol use: Not Currently     Comment: none for 20 years   • Drug use: Never   • Sexual activity: Defer         Current Outpatient Medications   Medication Sig Dispense Refill   •  "Acetaminophen (TYLENOL 8 HOUR PO) Take 1,000 mg by mouth 3 (Three) Times a Day.     • apixaban (ELIQUIS) 5 MG tablet tablet Take 1 tablet by mouth Every 12 (Twelve) Hours. 180 tablet 3   • DULoxetine (Cymbalta) 30 MG capsule Take 1 capsule by mouth Daily. 30 capsule 1   • famotidine (Pepcid) 20 MG tablet Take 1 tablet by mouth 2 (Two) Times a Day. 180 tablet 0   • fluticasone (FLONASE) 50 MCG/ACT nasal spray 2 sprays into the nostril(s) as directed by provider Daily.     • gabapentin (NEURONTIN) 100 MG capsule Take 1 capsule by mouth At Night As Needed (pain) for up to 90 days. 90 capsule 1   • Glucosamine-Chondroit-Vit C-Mn (GLUCOSAMINE 1500 COMPLEX PO) Take 1,500 mg by mouth 2 (two) times a day.     • Multiple Vitamins-Minerals (MULTIVITAMIN WITH MINERALS) tablet tablet Take 1 tablet by mouth Daily. GETS OTC     • Saw Palmetto 450 MG capsule Take  by mouth 2 (two) times a day. GETS OTC     • simvastatin (Zocor) 20 MG tablet Take 1 tablet by mouth Every Night. 90 tablet 1   • tamsulosin (Flomax) 0.4 MG capsule 24 hr capsule Take 1 capsule by mouth Daily. 90 capsule 1   • telmisartan-hydrochlorothiazide (MICARDIS HCT) 40-12.5 MG per tablet Take 1 tablet by mouth Daily. 90 tablet 1   • VITAMIN D, ERGOCALCIFEROL, PO Take  by mouth. GETS OTC     • metoprolol succinate XL (TOPROL-XL) 50 MG 24 hr tablet Take 1 tablet by mouth Daily. 90 tablet 3     No current facility-administered medications for this visit.         OBJECTIVE    /78 (BP Location: Left arm, Patient Position: Sitting, Cuff Size: Adult)   Pulse 95   Temp 98.4 °F (36.9 °C)   Ht 177.8 cm (70\")   Wt 84.4 kg (186 lb)   SpO2 97%   BMI 26.69 kg/m²         Review of Systems: The following systems were reviewed and no changes noted    Constitutional:  Denies recent weight loss, weight gain, fever or chills     HENT:  Denies any hearing loss, epistaxis, hoarseness, or difficulty speaking.     Eyes: Wears eyeglasses or contact lenses     Respiratory:  " Dyspnea with exertion, no cough, wheezing, or hemoptysis.     Cardiovascular: See HPI.  NYHA class II dyspnea on exertion.  No chest pain.  Occasional palpitation.    Gastrointestinal:  Denies change in bowel habits, dyspepsia, ulcer disease, hematochezia, or melena.     Endocrine: Negative for cold intolerance, heat intolerance, polydipsia, polyphagia and polyuria.     Genitourinary: Negative.      Musculoskeletal: DJD.  Chronic back pain    Neurological:  Denies any history of recurrent headaches, strokes, TIA, or seizure disorder.     Hematological: Denies any food allergies, seasonal allergies, bleeding disorders, or lymphadenopathy.  History of thrombocytopenia    Psychiatric/Behavioral: Denies any history of depression, substance abuse, or change in cognitive function.       Physical Exam: The following systems are reviewed and no changes noted    Constitutional: Cooperative, alert and oriented, well-developed, well-nourished, in no acute distress.     HENT:   Head: Normocephalic, normal hair patterns, no masses or tenderness.  Ears, Nose, and Throat: No gross abnormalities. No pallor or cyanosis. Dentition good.   Eyes: EOMS intact, PERRL, conjunctivae and lids unremarkable. Fundoscopic exam and visual fields not performed.   Neck: No palpable masses or adenopathy, no thyromegaly, no JVD, carotid pulses are full and equal bilaterally and without bruit.     Cardiovascular: irregular rhythm, S1 variable, S2 normal, no S3 or S4.  No murmurs, gallops, or rubs detected.     Pulmonary/Chest: Chest: normal symmetry, normal respiratory excursion, no intercostal retraction, no use of accessory muscles.     Pulmonary: Normal breath sounds. No rales or rhonchi.    Abdominal: Abdomen soft, bowel sounds normoactive, no masses, no hepatosplenomegaly, nontender, no bruit.     Musculoskeletal: No deformities, clubbing, cyanosis, erythema, or edema observed.     Neurological: No gross motor or sensory deficits noted, affect  appropriate, oriented to time, person, place.     Skin: Warm and dry to the touch, no apparent skin lesion or mass noted.     Psychiatric: He has a normal mood and affect. His behavior is normal. Judgment and thought content normal.         Procedures      Lab Results   Component Value Date    WBC 12.63 (H) 01/24/2022    HGB 14.9 01/24/2022    HCT 45.8 01/24/2022    MCV 91.1 01/24/2022     01/24/2022     Lab Results   Component Value Date    GLUCOSE 116 (H) 01/24/2022    BUN 14 01/24/2022    CREATININE 1.02 01/24/2022    EGFRIFNONA 73 01/24/2022    BCR 13.7 01/24/2022    CO2 31.3 (H) 01/24/2022    CALCIUM 9.7 01/24/2022    ALBUMIN 4.10 01/24/2022    AST 23 09/29/2021    ALT 19 09/29/2021     Lab Results   Component Value Date    CHOL 135 09/29/2021    CHOL 138 06/30/2021    CHOL 135 08/31/2020     Lab Results   Component Value Date    TRIG 52 09/29/2021    TRIG 51 06/30/2021    TRIG 55 08/31/2020     Lab Results   Component Value Date    HDL 57 09/29/2021    HDL 54 06/30/2021    HDL 56 08/31/2020     No components found for: LDLCALC  Lab Results   Component Value Date    LDL 66 09/29/2021    LDL 73 06/30/2021    LDL 68 08/31/2020     No results found for: HDLLDLRATIO  No components found for: CHOLHDL  Lab Results   Component Value Date    HGBA1C 5.50 09/29/2021     Lab Results   Component Value Date    TSH 0.518 08/31/2020           ASSESSMENT AND PLAN  Sergio Sandoval is a 66-year-old male with history of hypertension, hyperlipidemia, degenerative joint disease, cardiac arrhythmias including supraventricular tachycardia in the remote past and currently atrial fibrillation which appears to be persistent and present at least since 1998.  He has failed 2 previous ablations for atrial fibrillation.     He is stable from a cardiac standpoint and his predominant symptoms relates to arthritis.  For optimization of heart rate I have discontinued carvedilol and started him on metoprolol succinate 50 mg daily.  The dose  could be titrated if his heart rate is not under control.  Have continued anticoagulation with Eliquis, lipid-lowering therapy with simvastatin and antihypertensive therapy with telmisartan HCTZ.  His lipid profiles were in the normal range when checked in September 2021 with an LDL of 66 and all his lab results from January 2022 were reviewed in detail.    Diagnoses and all orders for this visit:    1. Atrial fibrillation, controlled (HCC) (Primary)  -     ECG 12 Lead    2. Essential hypertension    3. Mixed hyperlipidemia    4. Thrombocytopenia (HCC)    Other orders  -     metoprolol succinate XL (TOPROL-XL) 50 MG 24 hr tablet; Take 1 tablet by mouth Daily.  Dispense: 90 tablet; Refill: 3        Patient's Body mass index is 26.69 kg/m². indicating that he is overweight (BMI 25-29.9). Obesity-related health conditions include the following: hypertension, dyslipidemias and osteoarthritis. Obesity is unchanged. BMI is is above average; BMI management plan is completed. We discussed portion control and increasing exercise..      Sergio Sandoval  reports that he has never smoked. He has never used smokeless tobacco.      Rojas Higuera MD  3/7/2022  08:13 CST

## 2022-03-13 ENCOUNTER — PATIENT MESSAGE (OUTPATIENT)
Dept: FAMILY MEDICINE CLINIC | Facility: CLINIC | Age: 67
End: 2022-03-13

## 2022-03-13 DIAGNOSIS — M54.42 CHRONIC BILATERAL LOW BACK PAIN WITH BILATERAL SCIATICA: Chronic | ICD-10-CM

## 2022-03-13 DIAGNOSIS — M54.2 CHRONIC NECK PAIN: Chronic | ICD-10-CM

## 2022-03-13 DIAGNOSIS — M25.50 CHRONIC JOINT PAIN: Chronic | ICD-10-CM

## 2022-03-13 DIAGNOSIS — G89.29 CHRONIC NECK PAIN: Chronic | ICD-10-CM

## 2022-03-13 DIAGNOSIS — G89.29 CHRONIC JOINT PAIN: Chronic | ICD-10-CM

## 2022-03-13 DIAGNOSIS — M54.41 CHRONIC BILATERAL LOW BACK PAIN WITH BILATERAL SCIATICA: Chronic | ICD-10-CM

## 2022-03-13 DIAGNOSIS — G89.29 CHRONIC BILATERAL LOW BACK PAIN WITH BILATERAL SCIATICA: Chronic | ICD-10-CM

## 2022-03-14 RX ORDER — DULOXETIN HYDROCHLORIDE 30 MG/1
30 CAPSULE, DELAYED RELEASE ORAL DAILY
Qty: 30 CAPSULE | Refills: 1 | Status: SHIPPED | OUTPATIENT
Start: 2022-03-14 | End: 2022-05-06 | Stop reason: SDUPTHER

## 2022-03-14 NOTE — TELEPHONE ENCOUNTER
From: Sergio Sandoval  To: Klaus Gracia MD  Sent: 3/13/2022 10:29 AM CDT  Subject: Renewed scripts    One of my pescriptions was not filled.   DULoxetine 30 MG oral CPDR. 1 pill each morning

## 2022-03-16 DIAGNOSIS — M25.50 CHRONIC JOINT PAIN: Chronic | ICD-10-CM

## 2022-03-16 DIAGNOSIS — G89.29 CHRONIC BILATERAL LOW BACK PAIN WITH BILATERAL SCIATICA: Chronic | ICD-10-CM

## 2022-03-16 DIAGNOSIS — G89.29 CHRONIC NECK PAIN: Chronic | ICD-10-CM

## 2022-03-16 DIAGNOSIS — G89.29 CHRONIC JOINT PAIN: Chronic | ICD-10-CM

## 2022-03-16 DIAGNOSIS — M54.41 CHRONIC BILATERAL LOW BACK PAIN WITH BILATERAL SCIATICA: Chronic | ICD-10-CM

## 2022-03-16 DIAGNOSIS — M54.2 CHRONIC NECK PAIN: Chronic | ICD-10-CM

## 2022-03-16 DIAGNOSIS — M54.42 CHRONIC BILATERAL LOW BACK PAIN WITH BILATERAL SCIATICA: Chronic | ICD-10-CM

## 2022-03-16 RX ORDER — DULOXETIN HYDROCHLORIDE 30 MG/1
30 CAPSULE, DELAYED RELEASE ORAL DAILY
Qty: 30 CAPSULE | Refills: 1 | OUTPATIENT
Start: 2022-03-16

## 2022-03-16 NOTE — TELEPHONE ENCOUNTER
Rx Refill Note  Requested Prescriptions     Refused Prescriptions Disp Refills   • DULoxetine (Cymbalta) 30 MG capsule 30 capsule 1     Sig: Take 1 capsule by mouth Daily.      Last office visit with prescribing clinician: 10/28/2021      Next office visit with prescribing clinician: 6/10/2022   {TIP  Encounters:  RX SENT ON 3/14/22      {TIP  Please add Last Relevant Lab Date if appropriate  {TIP  Is Refill Pharmacy correct? YES  Maximo Mcadams LPN  03/16/22, 09:51 CDT

## 2022-05-06 ENCOUNTER — PATIENT MESSAGE (OUTPATIENT)
Dept: FAMILY MEDICINE CLINIC | Facility: CLINIC | Age: 67
End: 2022-05-06

## 2022-05-06 DIAGNOSIS — M54.41 CHRONIC BILATERAL LOW BACK PAIN WITH BILATERAL SCIATICA: Chronic | ICD-10-CM

## 2022-05-06 DIAGNOSIS — G89.29 CHRONIC NECK PAIN: Chronic | ICD-10-CM

## 2022-05-06 DIAGNOSIS — G89.29 CHRONIC BILATERAL LOW BACK PAIN WITH BILATERAL SCIATICA: Chronic | ICD-10-CM

## 2022-05-06 DIAGNOSIS — G89.29 CHRONIC JOINT PAIN: Chronic | ICD-10-CM

## 2022-05-06 DIAGNOSIS — M25.50 CHRONIC JOINT PAIN: Chronic | ICD-10-CM

## 2022-05-06 DIAGNOSIS — M54.42 CHRONIC BILATERAL LOW BACK PAIN WITH BILATERAL SCIATICA: Chronic | ICD-10-CM

## 2022-05-06 DIAGNOSIS — M54.2 CHRONIC NECK PAIN: Chronic | ICD-10-CM

## 2022-05-06 NOTE — TELEPHONE ENCOUNTER
From: Sergio Sandoval  To: Klaus Gracia MD  Sent: 5/6/2022 9:54 AM CDT  Subject: prescriptions    my DULoxetine 30 MG capsule is down to a weeks' supply, and my next appointment isn't until [facility] 10. may I have a refill to see me through until then?

## 2022-05-09 RX ORDER — DULOXETIN HYDROCHLORIDE 30 MG/1
30 CAPSULE, DELAYED RELEASE ORAL DAILY
Qty: 30 CAPSULE | Refills: 1 | Status: SHIPPED | OUTPATIENT
Start: 2022-05-09 | End: 2022-06-10 | Stop reason: SDUPTHER

## 2022-06-03 NOTE — PROGRESS NOTES
Subjective:  Sergio Sandoval is a 67 y.o. male who presents for       Patient Active Problem List   Diagnosis   • Mixed hyperlipidemia   • Essential hypertension   • Vitamin D deficiency   • Gastroesophageal reflux disease   • Benign prostatic hyperplasia with lower urinary tract symptoms   • Benign prostatic hyperplasia with weak urinary stream   • Prediabetes   • Dizziness   • Thrombocytopenia (HCC)   • Atrial fibrillation, controlled (HCC)   • Dyspnea on exertion   • History of thrombocytopenia   • History of prediabetes   • Longstanding persistent atrial fibrillation (HCC)   • Need for pneumococcal vaccine   • Multiple joint pain           Current Outpatient Medications:   •  Acetaminophen (TYLENOL 8 HOUR PO), Take 1,000 mg by mouth 3 (Three) Times a Day., Disp: , Rfl:   •  apixaban (ELIQUIS) 5 MG tablet tablet, Take 1 tablet by mouth Every 12 (Twelve) Hours., Disp: 180 tablet, Rfl: 3  •  famotidine (Pepcid) 20 MG tablet, Take 1 tablet by mouth 2 (Two) Times a Day., Disp: 180 tablet, Rfl: 0  •  fluticasone (FLONASE) 50 MCG/ACT nasal spray, 2 sprays into the nostril(s) as directed by provider Daily., Disp: , Rfl:   •  Glucosamine-Chondroit-Vit C-Mn (GLUCOSAMINE 1500 COMPLEX PO), Take 1,500 mg by mouth 2 (two) times a day., Disp: , Rfl:   •  metoprolol succinate XL (TOPROL-XL) 50 MG 24 hr tablet, Take 1 tablet by mouth Daily., Disp: 90 tablet, Rfl: 3  •  Multiple Vitamins-Minerals (MULTIVITAMIN WITH MINERALS) tablet tablet, Take 1 tablet by mouth Daily. GETS OTC, Disp: , Rfl:   •  Saw Palmetto 450 MG capsule, Take  by mouth 2 (two) times a day. GETS OTC, Disp: , Rfl:   •  simvastatin (Zocor) 20 MG tablet, Take 1 tablet by mouth Every Night., Disp: 90 tablet, Rfl: 1  •  tamsulosin (Flomax) 0.4 MG capsule 24 hr capsule, Take 1 capsule by mouth Daily., Disp: 90 capsule, Rfl: 1  •  telmisartan-hydrochlorothiazide (MICARDIS HCT) 40-12.5 MG per tablet, Take 1 tablet by mouth Daily., Disp: 90 tablet, Rfl: 1  •   VITAMIN D, ERGOCALCIFEROL, PO, Take  by mouth. GETS OTC, Disp: , Rfl:   •  DULoxetine (Cymbalta) 60 MG capsule, Take 1 capsule by mouth 2 (Two) Times a Day., Disp: 60 capsule, Rfl: 3  •  gabapentin (NEURONTIN) 100 MG capsule, Take 1 capsule by mouth At Night As Needed (pain) for up to 90 days., Disp: 90 capsule, Rfl: 1    Pt is 66 yo male with management of, GERD, HTN, HLP,  CAD, sp bilateral shoulder surgery, SVT, history of prediabetes, thrombocytopenia, atrial fibrillation sp ablation x 2 , CKD stage 2. History of COVID-19 infection     10/28/21 in office visit for recheck on pts' above medical issues. Pt had labwork done on 9/29/21 that showed negative rheumatoid panel hga1c is normal CBC showed GFR at 59 from 51.  CBC showed stable hemoglobin and WBC. Pt continues to take his medications for GERD, HTN, HLP,atrial fibrilation  he had x-rays of both knees that showed very mild degenerative arthritis. X-ray of wrist bilaterally were normal .  Pt state his wrist is better. Knees still having some pain.  He stopped taking his NSAIDS and ha upcoming appt with Nephrology soon.     6/10/22 in office visit for recheck. Pt is due for new labwork. Pt had labwork per Nephrology earlier this year and GFR wa at 73. CR at 1.02  He continues to see Cardiologist and last appt was on 3/7/22.  His coreg was switched to toprol XL. No chest pain no palpitations. HR stable. His main concern is lower back pain. He had COVID in December 2021 and his back pain started to hurt again.  He went to walk in clinic in January 2022  And had x-ray that showed severe spondylosis of L4-L5 and L5-S1. He also has spondylsosis of cervical spine. He also has pain on posterior left shoulder for several years. He has left shoulder surgery in 2006 in which he had separation of collar bone and had several anchors surgically placed pain is mainly around left scapula area.  It hurts to move left shoulder and has point tenderness on lower scapula. It also  hurts when he lifts weights. He has tried PT/OT on left shoulder after surgery for a couple of months. He did PT/OT after right shoulder surgery for a year. He tried PT/OT for his lower back for a month with no relief. He does lift weights      Atrial Fibrillation  Presents for follow-up visit. Symptoms include weakness. Symptoms are negative for an AICD problem, bradycardia, chest pain, dizziness, hypertension, hypotension, pacemaker problem, palpitations, shortness of breath, syncope and tachycardia. The symptoms have been stable. Past medical history includes atrial fibrillation.   Back Pain  This is a chronic problem. The current episode started more than 1 month ago. The problem occurs constantly. The pain is present in the gluteal and lumbar spine. The quality of the pain is described as aching. The pain is at a severity of 5/10. The symptoms are aggravated by bending and position. Stiffness is present all day. Associated symptoms include dysuria, numbness and weakness. Pertinent negatives include no abdominal pain, bladder incontinence, bowel incontinence, chest pain, fever, headaches, leg pain, paresis, paresthesias, pelvic pain, perianal numbness, tingling or weight loss. He has tried NSAIDs (neurontin cymbalta ) for the symptoms. The treatment provided moderate relief.   Shoulder Injury   The incident occurred at home. The left shoulder is affected. The incident occurred more than 1 week ago. There was no injury mechanism. The quality of the pain is described as shooting. Associated symptoms include numbness. Pertinent negatives include no chest pain or tingling. The symptoms are aggravated by movement, overhead lifting and palpation. Treatments tried: neurontin. The treatment provided no relief.   Chronic Kidney Disease  This is a chronic problem. The current episode started more than 1 year ago. The problem occurs constantly. The problem has been unchanged. Associated symptoms include arthralgias.  Pertinent negatives include no abdominal pain, anorexia, change in bowel habit, chest pain, chills, congestion, coughing, fatigue, fever, headaches, joint swelling, myalgias, nausea, neck pain, numbness, sore throat, swollen glands, urinary symptoms, vertigo, visual change, vomiting or weakness. Nothing aggravates the symptoms. He has tried nothing for the symptoms. The treatment provided no relief.   Heart Problem  This is a chronic problem. The current episode started more than 1 year ago. The problem occurs constantly. The problem has been unchanged. Associated symptoms include arthralgias and fatigue. Pertinent negatives include no abdominal pain, anorexia, change in bowel habit, chest pain, chills, congestion, coughing, diaphoresis, fever, headaches, joint swelling, myalgias, nausea, neck pain, numbness, sore throat, swollen glands, urinary symptoms, vertigo, visual change, vomiting or weakness. The symptoms are aggravated by bending. Treatments tried: flecanaide  The treatment provided significant relief.   Hypertension   This is a chronic problem. The current episode started more than 1 month ago. The problem is unchanged. The problem is uncontrolled. Pertinent negatives include no anxiety, blurred vision, chest pain, headaches, malaise/fatigue, neck pain, orthopnea, palpitations, peripheral edema, PND, shortness of breath or sweats. Risk factors for coronary artery disease include male gender and dyslipidemia. Past treatments include beta blockers, angiotensin blockers and diuretics. Current antihypertension treatment includes beta blockers, angiotensin blockers and diuretics. The current treatment provides moderate improvement. There are no compliance problems.  There is no history of angina, kidney disease, CAD/MI, CVA, heart failure, left ventricular hypertrophy, PVD or retinopathy. There is no history of chronic renal disease, coarctation of the  aorta, hyperaldosteronism, hypercortisolism, hyperparathyroidism, a hypertension causing med, pheochromocytoma, renovascular disease, sleep apnea or a thyroid problem.   Hyperlipidemia   This is a chronic problem. The current episode started more than 1 year ago. He has no history of chronic renal disease. Pertinent negatives include no chest pain or shortness of breath. The current treatment provides mild improvement of lipids. There are no compliance problems.     Male  Problem   The patient's pertinent negatives include no genital injury, genital itching, genital lesions, pelvic pain, penile discharge, penile pain, priapism, scrotal swelling or testicular pain. Primary symptoms comment: difficulty urinating. This is a new problem. The problem occurs constantly. The problem has been improving  Pertinent negatives include no abdominal pain, anorexia, chest pain, chills, constipation, coughing, diarrhea, discolored urine, dysuria, fever, flank pain, frequency, headaches, hematuria, hesitancy, joint pain, joint swelling, nausea, painful intercourse, rash, shortness of breath, urgency, urinary retention or vomiting. Nothing aggravates the symptoms. He has tried nothing for the symptoms. The treatment provided no relief. He is sexually active. There is no history of chlamydia, cryptorchidism, erectile aid use, erectile dysfunction, a femoral hernia, gonorrhea, herpes simplex, HIV, an inguinal hernia, kidney stones, prostatitis, sickle cell disease, syphilis or varicocele. Has tried flomax and had significant relief     Review of Systems  Review of Systems   Constitutional: Positive for activity change and fatigue. Negative for appetite change, chills, diaphoresis, fever and weight loss.   HENT: Negative for congestion, postnasal drip, rhinorrhea, sinus pressure, sinus pain, sneezing, sore throat, trouble swallowing and voice change.    Respiratory: Negative for cough, choking, chest tightness, shortness of breath,  wheezing and stridor.    Cardiovascular: Negative for chest pain, palpitations and syncope.   Gastrointestinal: Negative for abdominal pain, bowel incontinence, diarrhea, nausea and vomiting.   Genitourinary: Positive for difficulty urinating and dysuria. Negative for bladder incontinence and pelvic pain.   Musculoskeletal: Positive for back pain.   Neurological: Positive for weakness and numbness. Negative for dizziness, tingling, headaches and paresthesias.       Patient Active Problem List   Diagnosis   • Mixed hyperlipidemia   • Essential hypertension   • Vitamin D deficiency   • Gastroesophageal reflux disease   • Benign prostatic hyperplasia with lower urinary tract symptoms   • Benign prostatic hyperplasia with weak urinary stream   • Prediabetes   • Dizziness   • Thrombocytopenia (HCC)   • Atrial fibrillation, controlled (HCC)   • Dyspnea on exertion   • History of thrombocytopenia   • History of prediabetes   • Longstanding persistent atrial fibrillation (HCC)   • Need for pneumococcal vaccine   • Multiple joint pain     Past Surgical History:   Procedure Laterality Date   • CARDIAC ABLATION     • COLONOSCOPY     • SHOULDER SURGERY     • UPPER GASTROINTESTINAL ENDOSCOPY      with esophageal dilation   • WISDOM TOOTH EXTRACTION       Social History     Socioeconomic History   • Marital status:    Tobacco Use   • Smoking status: Never Smoker   • Smokeless tobacco: Never Used   Vaping Use   • Vaping Use: Never used   Substance and Sexual Activity   • Alcohol use: Not Currently     Comment: none for 20 years   • Drug use: Never   • Sexual activity: Defer     Family History   Problem Relation Age of Onset   • Arthritis Mother    • Heart disease Mother    • Rheumatic fever Mother    • Hyperlipidemia Father    • Hypertension Father    • Diabetes Father    • Stroke Father    • Prostate cancer Father    • Skin cancer Father    • Cancer Sister    • Alcohol abuse Brother    • No Known Problems Daughter    • No  Known Problems Son    • Prostate cancer Paternal Grandfather    • No Known Problems Sister      Lab on 01/24/2022   Component Date Value Ref Range Status   • WBC 01/24/2022 12.63 (A) 3.40 - 10.80 10*3/mm3 Final   • RBC 01/24/2022 5.03  4.14 - 5.80 10*6/mm3 Final   • Hemoglobin 01/24/2022 14.9  13.0 - 17.7 g/dL Final   • Hematocrit 01/24/2022 45.8  37.5 - 51.0 % Final   • MCV 01/24/2022 91.1  79.0 - 97.0 fL Final   • MCH 01/24/2022 29.6  26.6 - 33.0 pg Final   • MCHC 01/24/2022 32.5  31.5 - 35.7 g/dL Final   • RDW 01/24/2022 11.9 (A) 12.3 - 15.4 % Final   • RDW-SD 01/24/2022 38.9  37.0 - 54.0 fl Final   • MPV 01/24/2022 10.5  6.0 - 12.0 fL Final   • Platelets 01/24/2022 186  140 - 450 10*3/mm3 Final   • Glucose 01/24/2022 116 (A) 65 - 99 mg/dL Final   • BUN 01/24/2022 14  8 - 23 mg/dL Final   • Creatinine 01/24/2022 1.02  0.76 - 1.27 mg/dL Final   • Sodium 01/24/2022 139  136 - 145 mmol/L Final   • Potassium 01/24/2022 5.0  3.5 - 5.2 mmol/L Final   • Chloride 01/24/2022 100  98 - 107 mmol/L Final   • CO2 01/24/2022 31.3 (A) 22.0 - 29.0 mmol/L Final   • Calcium 01/24/2022 9.7  8.6 - 10.5 mg/dL Final   • Albumin 01/24/2022 4.10  3.50 - 5.20 g/dL Final   • Phosphorus 01/24/2022 3.6  2.5 - 4.5 mg/dL Final   • Anion Gap 01/24/2022 7.7  5.0 - 15.0 mmol/L Final   • BUN/Creatinine Ratio 01/24/2022 13.7  7.0 - 25.0 Final   • eGFR Non  Amer 01/24/2022 73  >60 mL/min/1.73 Final   • Uric Acid 01/24/2022 6.5  3.4 - 7.0 mg/dL Final   • Protein/Creatinine Ratio, Urine 01/24/2022 87.2  0.0 - 200.0 mg/G Crea Final   • Creatinine, Urine 01/24/2022 126.1  mg/dL Final   • Total Protein, Urine 01/24/2022 11.0  mg/dL Final   • Magnesium 01/24/2022 2.4  1.6 - 2.4 mg/dL Final   • Color, UA 01/24/2022 Yellow  Yellow, Straw Final   • Appearance, UA 01/24/2022 Clear  Clear Final   • pH, UA 01/24/2022 6.5  5.0 - 8.0 Final   • Specific Gravity, UA 01/24/2022 1.021  1.005 - 1.030 Final   • Glucose, UA 01/24/2022 Negative  Negative Final    • Ketones, UA 01/24/2022 Negative  Negative Final   • Bilirubin, UA 01/24/2022 Negative  Negative Final   • Blood, UA 01/24/2022 Negative  Negative Final   • Protein, UA 01/24/2022 Negative  Negative Final   • Leuk Esterase, UA 01/24/2022 Negative  Negative Final   • Nitrite, UA 01/24/2022 Negative  Negative Final   • Urobilinogen, UA 01/24/2022 0.2 E.U./dL  0.2 - 1.0 E.U./dL Final   • RBC, UA 01/24/2022 0-2  None Seen, 0-2 /HPF Final   • WBC, UA 01/24/2022 0-2  None Seen, 0-2 /HPF Final   • Bacteria, UA 01/24/2022 None Seen  None Seen /HPF Final   • Squamous Epithelial Cells, UA 01/24/2022 0-2  None Seen, 0-2 /HPF Final   • Hyaline Casts, UA 01/24/2022 None Seen  None Seen /LPF Final   • Methodology 01/24/2022 Automated Microscopy   Final      MRI cardiac with contrast  Reviewed by: Guillermo Londono on 04/23/2012 09:59;   --------       History: 56-year-old man with atrial fibrillation       Technique: Following acquisition of  images, axial and coronal     HASTE images were obtained through the chest. Two chamber,     four-chamber, and multislice axial cine true FISP images were obtained     from the level of the aortic arch to the diaphragm. Coronal 3-D FSPGR     images were acquired during the intravenous administration of 0.2 mmol     per kilogram of Magnevist contrast. Phase velocity maps of the LVOT and     ascending aorta were obtained using velocity encoding of 150 and 250     cm/s, respectively. Ten minutes following the intravenous     administration of Magnevist contrast, short axis single shot inversion     recovery true FISP and phase-sensitive inversion recovery true FISP     images were obtained using optimized inversion time. Two chamber,     four-chamber, and LVOT phase-sensitive inversion recovery gradient     echoes were acquired. During the study, the patient was in atrial     fibrillation with a heart rate of  beats per minute. The blood     pressure was 146/98 mmHg.        FINDINGS:       Morphology: The left ventricular cavity is small with a maximal     end-diastolic dimension of 35 mm and end-diastolic volume of 68 mL. The     interventricular septum measures 14 mm at end diastole. The left     ventricular posterior wall measures 11 mm at end diastole. The right     ventricle is normal in size with an end-diastolic dimension of 30 mm.     the atria are normal in size (right 30 mm, left 29 mm).       Function: Left ventricular systolic function is normal with an LV     ejection fraction of 63 % and a stroke volume of 43 mL per cardiac     cycle by volumetric analysis. There are no regional wall motion     abnormalities. Right ventricular systolic function is preserved.       Myocardial delayed enhancement imaging: There is no myocardial delayed     enhancement.       Valves: No significant valvular stenosis or regurgitation is     visualized. However, true FISP imaging is less sensitive to milder     degrees of turbulent flow.       Great vessels: The aorta is left-sided and normal in size (ascending 33     mm, descending 24 mm). The pulmonary arteries are normal in size (main     23 mm, right 16 mm, left 18 mm). The IVC is normal in size (pars     suprahepatica 20 mm, pars hepatica 9 mm). There are four pulmonary     veins draining into left atrium. Pulmonary venous dimensions are as     follows: Left upper 17 mm, left lower 13 mm, right upper 11 mm, right     lower 22 mm (including a small right middle lobe vein that joins the     right lower lobe vein near the ostium). There is no evidence of     pulmonary vein stenosis.       Other: The pericardium is normal in thickness. There is no pericardial     effusion. There is no intracardiac mass or thrombus. There is a large     hiatal hernia.       IMPRESSION:     1. Normal biventricular systolic function. LV ejection fraction 63 %.     2. Normal pulmonary vein anatomy with common variant of right middle     lobe vein. No evidence of  pulmonary vein stenosis.     3. No significant valvular abnormalities.     4. No prior infarction, myocarditis, or infiltration.       Electronically signed by Jorge Loera on 4/19/2012 4:57 PM       I, Yfn Prakash MD, have reviewed the images and verify the     above interpretation on 4/19/2012 5:52 PM.       Electronically signed by Yfn Prakash MD on 4/19/2012 5:52 PM  MRI cardiac with contrast     [facility]     [address], [address]     [place], [place]     [phone]       CARDIAC MRI WITH CONTRAST     =======================================================================     ==       FINAL IMPRESSIONS:     1. Atrial fibrillation, status post pulmonary vein isolation.     2. Normal left ventricular chamber size and systolic function. LVEF     56%.     3. Normal right ventricular chamber size and systolic function.     4. No significant valvular abnormalities.     5. Normal pulmonary vein anatomy; left superior 14 mm, left inferior 15     mm, right superior 8 mm, right inferior 21 mm (including small     accessory vein).       ---------------------------------------------------------------     PATIENT DATA     ---------------------------------------------------------------     History: Atrial fibrillation, status post pulmonary vein isolation.     Reason for study: Cardiac .31 ATRIAL FIBRILLATION.       Age: 57 years, Gender: Male, Height: 70 in, Weight: 216 lbs, BSA: 2.2     m2     Rhythm: Sinus w PVC, Pulse: 70 BPM, Blood Pressure: 147 / 88 mmHg     Serum Cr: mg/dL     ---------------------------------------------------------------     FINDINGS     ---------------------------------------------------------------     Study Quality: Good.       Left Ventricle: Normal chamber size. Normal systolic function. LVEF     56%.       Left Atrium: Normal       Right Ventricle: Normal chamber size. Normal systolic function.       Right Atrium:  Normal       Aortic Valve: Normal       Mitral Valve: Normal       Tricuspid Valve: Normal       Great Vessels: Left sided aorta, normal.       Pericardium: Normal pericardial thickness. No pericardial effusion.       Other: No intracardiac clot or mass.     -------------------------------------------------------------     MEASUREMENTS     -------------------------------------------------------------     Left ventricle     IVSd 10 mm, LVPWd 5 mm, LVM nc g, LVMi nc g/m2     LVEDD 54 mm, LVEDV 102 mL, LVEDVi 47 mL/m2     LVESD 38 mm, LVESV 45 ml, LVESVi 21 ml/m2     LVEF 56 %, LVSV 56 ml, Transaortic.SV nc ml, CO 3.9 L/min, CI 1.8     L/min/m2       Right ventricle     RVEDD 29 mm, RVEDV nc ml, RVEDVi nc ml/m2     RVEF nc %, RVSV nc ml, Transpulmonic.SV nc ml       Atria     RA 23 mm, LA 22 mm, LA Volume nc ml       Great Vessels     Ao Root 32 mm, Ascending Ao 32 mm, Descending Ao 27 mm     MPA 26 mm, RPA 18 mm, LPA 17 mm     IVC pars hepatica 7 mm, pars suprahepatica 17 mm     --------------------------------------------------------------     WALL MOTION     --------------------------------------------------------------     Left Ventricle     Anteroseptal: normal     Anterior: normal     Anterolateral: normal     Inferolateral: normal     Inferior: normal     Inferoseptal: normal     Apical: normal     -------------------------------------------------------------     MRI SCAN TECHNIQUE     -------------------------------------------------------------     Images were acquired on a Siemens 1.5T Avanto. Following  images,     axial and coronal HASTE and axial static FISP images were acquired     through the chest. Two-chamber, 4-chamber and serial axial cine FISP     images were obtained. Following the intravenous administration of 30 mL     (0.2 mmol/kg) Magnevist contrast, coronal 3D-FSPGR images of the chest     were acquired. Ascending aorta phase velocity map was acquired using a     velocity encoding of 250  "cm/s. In-plane phase contrast images were     acquired in the LVOT orientation.       The [facility] is accredited by the [employer]     Commission for the Accreditation of Magnetic Resonance Laboratories.       End of report       Electronically signed by Yfn Prakash MD on 10/15/2012 5:44 PM    [unfilled]  Immunization History   Administered Date(s) Administered   • COVID-19 (MODERNA) 1st, 2nd, 3rd Dose Only 01/06/2021, 02/03/2021   • FluLaval/Fluarix/Fluzone >6 09/15/2020   • Fluzone High-Dose 65+yrs 09/29/2021   • Pneumococcal Polysaccharide (PPSV23) 10/28/2021       The following portions of the patient's history were reviewed and updated as appropriate: allergies, current medications, past family history, past medical history, past social history, past surgical history and problem list.        Physical Exam  /70 (BP Location: Left arm, Patient Position: Sitting, Cuff Size: Adult)   Pulse 98   Temp 97.8 °F (36.6 °C)   Ht 177.8 cm (70\")   Wt 80.4 kg (177 lb 3.2 oz)   SpO2 97%   BMI 25.43 kg/m²     Physical Exam  Vitals and nursing note reviewed.   Constitutional:       Appearance: He is well-developed. He is not diaphoretic.   HENT:      Head: Normocephalic and atraumatic.      Right Ear: External ear normal.   Eyes:      Conjunctiva/sclera: Conjunctivae normal.      Pupils: Pupils are equal, round, and reactive to light.   Cardiovascular:      Rate and Rhythm: Normal rate and regular rhythm.      Heart sounds: Normal heart sounds. No murmur heard.  Pulmonary:      Effort: Pulmonary effort is normal. No respiratory distress.      Breath sounds: Normal breath sounds.   Abdominal:      General: Bowel sounds are normal. There is no distension.      Palpations: Abdomen is soft.      Tenderness: There is no abdominal tenderness.   Musculoskeletal:         General: Tenderness present. No deformity.      Right shoulder: Tenderness and bony tenderness present. Decreased range of " motion.        Arms:       Cervical back: Normal range of motion and neck supple.      Lumbar back: Spasms, tenderness and bony tenderness present. Decreased range of motion.   Skin:     General: Skin is warm.      Coloration: Skin is not pale.      Findings: No erythema or rash.   Neurological:      Mental Status: He is alert and oriented to person, place, and time.      Cranial Nerves: No cranial nerve deficit.   Psychiatric:         Behavior: Behavior normal.         [unfilled]   Diagnosis Plan   1. Chronic left shoulder pain  XR Shoulder 2+ View Left    MRI Shoulder Left Without Contrast   2. Vitamin D deficiency  CBC Auto Differential    Comprehensive Metabolic Panel    Hemoglobin A1c    Lipid Panel    Vitamin D 25 Hydroxy    Vitamin B12   3. Essential hypertension  CBC Auto Differential    Comprehensive Metabolic Panel    Hemoglobin A1c    Lipid Panel    Vitamin D 25 Hydroxy    Vitamin B12   4. Mixed hyperlipidemia  CBC Auto Differential    Comprehensive Metabolic Panel    Hemoglobin A1c    Lipid Panel    Vitamin D 25 Hydroxy    Vitamin B12   5. Atrial fibrillation, controlled (HCC)  CBC Auto Differential    Comprehensive Metabolic Panel    Hemoglobin A1c    Lipid Panel    Vitamin D 25 Hydroxy    Vitamin B12   6. Benign prostatic hyperplasia with lower urinary tract symptoms, symptom details unspecified  CBC Auto Differential    Comprehensive Metabolic Panel    Hemoglobin A1c    Lipid Panel    Vitamin D 25 Hydroxy    Vitamin B12   7. Chronic bilateral low back pain with bilateral sciatica  MRI Lumbar Spine Without Contrast   8. Chronic neck pain     9. Chronic joint pain     10. Gastroesophageal reflux disease, unspecified whether esophagitis present  famotidine (Pepcid) 20 MG tablet   11. Pain of left scapula  XR Scapula Left    MRI Shoulder Left Without Contrast   12. Lumbar spondylosis  MRI Lumbar Spine Without Contrast   13. History of shoulder surgery  MRI Shoulder Left Without Contrast   14.  History of prediabetes               -recommend labwork   -recommend shingles/ vaccination  -recommend COVID-19 vaccination   -recommend tdap vaccination  -Left shoulder pain/scapula pain - will get x-ray and MRI of left shoulder increase cymbalta from 30  PO daily to BID  -lower back pain/spondylosis of lumbar spine - will get MRI of back. Pt failed PT/OT. Will increase cymbalta from daily to bID  -bilateral knee and wrist pain/arthritis of both knees/multiple joint pain  - reviewed x-ray of knees. He will hold off Orthopedic referral for now. Off NSAIDS currently. Recommend cymbalta in event pt needs medication for pain   -dizziness -currently stable   -overweight -BMI at 26.41   -BPH - on flomax 0.4 mg PO qhs.    -history of prediabetes - prediabetes meal plan information provided  -history pf thrombocytopenia - continue to monitor. Pt reports no active bleeding or bruising. Consider Hematology referral   -CKD stage 2 -Nephrology following   -family history prostate cancer/difficulty urinating/BPH - recent PSA normal .recent US of prostate shows enlarged prostate . Start on flomax 0.4 m PO qhs   -SVT/atrial fibrillation  -was on flecanide 50 mg PO BID stopped  Currently  on toprol XL XR 50 mg daily. and losartan-HCTZ daily.   Cardiology following. Recent stress test was low risk study.  -HLP - on simvastatin 80 mg PO qhs recommend heart healthy diet   -HTN  - on micadis 40-12.5 mg PO q daily,  Coreg 6.25 mg PO BID,    -GERD -on pepcid 20 mg PO BID   -vitamin D defiicency -vitamin D once a week   -advised pt to be safe and call with questions and concerns  -advised pt to go to ER or call 911 if symptoms worrisome or severe  -advised pt to followup with specialist and referrals  -advised pt to be safe during COVID-19 pandemic  I spent 30  minutes caring for Sergio on this date of service. This time includes time spent by me in the following activities: preparing for the visit, reviewing tests, obtaining and/or  reviewing a separately obtained history, performing a medically appropriate examination and/or evaluation, counseling and educating the patient/family/caregiver, ordering medications, tests, or procedures, referring and communicating with other health care professionals, documenting information in the medical record, independently interpreting results and communicating that information with the patient/family/caregiver and care coordination  -recheck in 4 weeks         This document has been electronically signed by Oneil Jorgensen MD on Neeta 10, 2022 15:54 CDT

## 2022-06-10 ENCOUNTER — OFFICE VISIT (OUTPATIENT)
Dept: FAMILY MEDICINE CLINIC | Facility: CLINIC | Age: 67
End: 2022-06-10

## 2022-06-10 VITALS
HEIGHT: 70 IN | HEART RATE: 98 BPM | BODY MASS INDEX: 25.37 KG/M2 | OXYGEN SATURATION: 97 % | TEMPERATURE: 97.8 F | SYSTOLIC BLOOD PRESSURE: 106 MMHG | DIASTOLIC BLOOD PRESSURE: 70 MMHG | WEIGHT: 177.2 LBS

## 2022-06-10 DIAGNOSIS — Z98.890 HISTORY OF SHOULDER SURGERY: ICD-10-CM

## 2022-06-10 DIAGNOSIS — M47.816 LUMBAR SPONDYLOSIS: ICD-10-CM

## 2022-06-10 DIAGNOSIS — G89.29 CHRONIC NECK PAIN: Chronic | ICD-10-CM

## 2022-06-10 DIAGNOSIS — E78.2 MIXED HYPERLIPIDEMIA: ICD-10-CM

## 2022-06-10 DIAGNOSIS — N40.1 BENIGN PROSTATIC HYPERPLASIA WITH LOWER URINARY TRACT SYMPTOMS, SYMPTOM DETAILS UNSPECIFIED: ICD-10-CM

## 2022-06-10 DIAGNOSIS — E55.9 VITAMIN D DEFICIENCY: ICD-10-CM

## 2022-06-10 DIAGNOSIS — I10 ESSENTIAL HYPERTENSION: ICD-10-CM

## 2022-06-10 DIAGNOSIS — G89.29 CHRONIC LEFT SHOULDER PAIN: Primary | ICD-10-CM

## 2022-06-10 DIAGNOSIS — M54.2 CHRONIC NECK PAIN: Chronic | ICD-10-CM

## 2022-06-10 DIAGNOSIS — M54.42 CHRONIC BILATERAL LOW BACK PAIN WITH BILATERAL SCIATICA: Chronic | ICD-10-CM

## 2022-06-10 DIAGNOSIS — M54.41 CHRONIC BILATERAL LOW BACK PAIN WITH BILATERAL SCIATICA: Chronic | ICD-10-CM

## 2022-06-10 DIAGNOSIS — M25.512 CHRONIC LEFT SHOULDER PAIN: Primary | ICD-10-CM

## 2022-06-10 DIAGNOSIS — I48.91 ATRIAL FIBRILLATION, CONTROLLED: ICD-10-CM

## 2022-06-10 DIAGNOSIS — M89.8X1 PAIN OF LEFT SCAPULA: ICD-10-CM

## 2022-06-10 DIAGNOSIS — G89.29 CHRONIC JOINT PAIN: Chronic | ICD-10-CM

## 2022-06-10 DIAGNOSIS — G89.29 CHRONIC BILATERAL LOW BACK PAIN WITH BILATERAL SCIATICA: Chronic | ICD-10-CM

## 2022-06-10 DIAGNOSIS — M25.50 CHRONIC JOINT PAIN: Chronic | ICD-10-CM

## 2022-06-10 DIAGNOSIS — Z87.898 HISTORY OF PREDIABETES: ICD-10-CM

## 2022-06-10 DIAGNOSIS — K21.9 GASTROESOPHAGEAL REFLUX DISEASE, UNSPECIFIED WHETHER ESOPHAGITIS PRESENT: ICD-10-CM

## 2022-06-10 PROCEDURE — 99214 OFFICE O/P EST MOD 30 MIN: CPT | Performed by: FAMILY MEDICINE

## 2022-06-10 RX ORDER — DULOXETIN HYDROCHLORIDE 30 MG/1
30 CAPSULE, DELAYED RELEASE ORAL DAILY
Qty: 30 CAPSULE | Refills: 1 | Status: SHIPPED | OUTPATIENT
Start: 2022-06-10 | End: 2022-06-10

## 2022-06-10 RX ORDER — DULOXETIN HYDROCHLORIDE 60 MG/1
60 CAPSULE, DELAYED RELEASE ORAL 2 TIMES DAILY
Qty: 60 CAPSULE | Refills: 3 | Status: SHIPPED | OUTPATIENT
Start: 2022-06-10 | End: 2022-06-14 | Stop reason: SDUPTHER

## 2022-06-10 RX ORDER — FAMOTIDINE 20 MG/1
20 TABLET, FILM COATED ORAL 2 TIMES DAILY
Qty: 180 TABLET | Refills: 0 | Status: SHIPPED | OUTPATIENT
Start: 2022-06-10 | End: 2022-06-20 | Stop reason: SDUPTHER

## 2022-06-14 RX ORDER — DULOXETIN HYDROCHLORIDE 60 MG/1
60 CAPSULE, DELAYED RELEASE ORAL DAILY
Qty: 60 CAPSULE | Refills: 3 | Status: SHIPPED | OUTPATIENT
Start: 2022-06-14 | End: 2022-08-05 | Stop reason: SDUPTHER

## 2022-06-15 ENCOUNTER — LAB (OUTPATIENT)
Dept: LAB | Facility: HOSPITAL | Age: 67
End: 2022-06-15

## 2022-06-15 DIAGNOSIS — I10 ESSENTIAL HYPERTENSION: ICD-10-CM

## 2022-06-15 DIAGNOSIS — E55.9 VITAMIN D DEFICIENCY: ICD-10-CM

## 2022-06-15 DIAGNOSIS — I48.91 ATRIAL FIBRILLATION, CONTROLLED: ICD-10-CM

## 2022-06-15 DIAGNOSIS — N40.1 BENIGN PROSTATIC HYPERPLASIA WITH LOWER URINARY TRACT SYMPTOMS, SYMPTOM DETAILS UNSPECIFIED: ICD-10-CM

## 2022-06-15 DIAGNOSIS — E78.2 MIXED HYPERLIPIDEMIA: ICD-10-CM

## 2022-06-15 LAB
25(OH)D3 SERPL-MCNC: 51.2 NG/ML (ref 30–100)
ALBUMIN SERPL-MCNC: 4.6 G/DL (ref 3.5–5.2)
ALBUMIN/GLOB SERPL: 2 G/DL
ALP SERPL-CCNC: 54 U/L (ref 39–117)
ALT SERPL W P-5'-P-CCNC: 18 U/L (ref 1–41)
ANION GAP SERPL CALCULATED.3IONS-SCNC: 11.2 MMOL/L (ref 5–15)
AST SERPL-CCNC: 24 U/L (ref 1–40)
BASOPHILS # BLD AUTO: 0.02 10*3/MM3 (ref 0–0.2)
BASOPHILS NFR BLD AUTO: 0.4 % (ref 0–1.5)
BILIRUB SERPL-MCNC: 0.7 MG/DL (ref 0–1.2)
BUN SERPL-MCNC: 13 MG/DL (ref 8–23)
BUN/CREAT SERPL: 12.1 (ref 7–25)
CALCIUM SPEC-SCNC: 9.3 MG/DL (ref 8.6–10.5)
CHLORIDE SERPL-SCNC: 104 MMOL/L (ref 98–107)
CHOLEST SERPL-MCNC: 121 MG/DL (ref 0–200)
CO2 SERPL-SCNC: 24.8 MMOL/L (ref 22–29)
CREAT SERPL-MCNC: 1.07 MG/DL (ref 0.76–1.27)
DEPRECATED RDW RBC AUTO: 39.8 FL (ref 37–54)
EGFRCR SERPLBLD CKD-EPI 2021: 76.5 ML/MIN/1.73
EOSINOPHIL # BLD AUTO: 0.03 10*3/MM3 (ref 0–0.4)
EOSINOPHIL NFR BLD AUTO: 0.6 % (ref 0.3–6.2)
ERYTHROCYTE [DISTWIDTH] IN BLOOD BY AUTOMATED COUNT: 12.5 % (ref 12.3–15.4)
GLOBULIN UR ELPH-MCNC: 2.3 GM/DL
GLUCOSE SERPL-MCNC: 98 MG/DL (ref 65–99)
HBA1C MFR BLD: 6.2 % (ref 4.8–5.6)
HCT VFR BLD AUTO: 42.9 % (ref 37.5–51)
HDLC SERPL-MCNC: 57 MG/DL (ref 40–60)
HGB BLD-MCNC: 14.4 G/DL (ref 13–17.7)
IMM GRANULOCYTES # BLD AUTO: 0.02 10*3/MM3 (ref 0–0.05)
IMM GRANULOCYTES NFR BLD AUTO: 0.4 % (ref 0–0.5)
LDLC SERPL CALC-MCNC: 52 MG/DL (ref 0–100)
LDLC/HDLC SERPL: 0.93 {RATIO}
LYMPHOCYTES # BLD AUTO: 1.41 10*3/MM3 (ref 0.7–3.1)
LYMPHOCYTES NFR BLD AUTO: 27.1 % (ref 19.6–45.3)
MCH RBC QN AUTO: 29.8 PG (ref 26.6–33)
MCHC RBC AUTO-ENTMCNC: 33.6 G/DL (ref 31.5–35.7)
MCV RBC AUTO: 88.8 FL (ref 79–97)
MONOCYTES # BLD AUTO: 0.4 10*3/MM3 (ref 0.1–0.9)
MONOCYTES NFR BLD AUTO: 7.7 % (ref 5–12)
NEUTROPHILS NFR BLD AUTO: 3.32 10*3/MM3 (ref 1.7–7)
NEUTROPHILS NFR BLD AUTO: 63.8 % (ref 42.7–76)
NRBC BLD AUTO-RTO: 0.2 /100 WBC (ref 0–0.2)
PLATELET # BLD AUTO: 179 10*3/MM3 (ref 140–450)
PMV BLD AUTO: 10.7 FL (ref 6–12)
POTASSIUM SERPL-SCNC: 4 MMOL/L (ref 3.5–5.2)
PROT SERPL-MCNC: 6.9 G/DL (ref 6–8.5)
RBC # BLD AUTO: 4.83 10*6/MM3 (ref 4.14–5.8)
SODIUM SERPL-SCNC: 140 MMOL/L (ref 136–145)
TRIGL SERPL-MCNC: 54 MG/DL (ref 0–150)
VIT B12 BLD-MCNC: 544 PG/ML (ref 211–946)
VLDLC SERPL-MCNC: 12 MG/DL (ref 5–40)
WBC NRBC COR # BLD: 5.2 10*3/MM3 (ref 3.4–10.8)

## 2022-06-15 PROCEDURE — 85025 COMPLETE CBC W/AUTO DIFF WBC: CPT

## 2022-06-15 PROCEDURE — 82607 VITAMIN B-12: CPT

## 2022-06-15 PROCEDURE — 80061 LIPID PANEL: CPT

## 2022-06-15 PROCEDURE — 80053 COMPREHEN METABOLIC PANEL: CPT

## 2022-06-15 PROCEDURE — 82306 VITAMIN D 25 HYDROXY: CPT

## 2022-06-15 PROCEDURE — 83036 HEMOGLOBIN GLYCOSYLATED A1C: CPT

## 2022-06-16 ENCOUNTER — TELEPHONE (OUTPATIENT)
Dept: FAMILY MEDICINE CLINIC | Facility: CLINIC | Age: 67
End: 2022-06-16

## 2022-06-16 RX ORDER — METFORMIN HYDROCHLORIDE 500 MG/1
500 TABLET, EXTENDED RELEASE ORAL
Qty: 30 TABLET | Refills: 3 | Status: SHIPPED | OUTPATIENT
Start: 2022-06-16 | End: 2022-08-05 | Stop reason: SDUPTHER

## 2022-06-16 NOTE — TELEPHONE ENCOUNTER
----- Message from Oneil Jorgensen MD sent at 6/16/2022  7:28 AM CDT -----  Please call pt    Hga1c at 6.20. recommend pt watch sugar and carb intake. If hga1c gets over 6.5 it will be diabetes. Recommend pt start on metformin  mg PO q daily give 30 pills and 3 refills may cause GI upset and diarrhea.     On CMP kidney function shows GFR in 70s. Will continue to monitor    Rest of labwork stable    Recheck on next visit thanks

## 2022-06-20 ENCOUNTER — TELEPHONE (OUTPATIENT)
Dept: FAMILY MEDICINE CLINIC | Facility: CLINIC | Age: 67
End: 2022-06-20

## 2022-06-20 DIAGNOSIS — N40.1 BENIGN PROSTATIC HYPERPLASIA WITH LOWER URINARY TRACT SYMPTOMS, SYMPTOM DETAILS UNSPECIFIED: ICD-10-CM

## 2022-06-20 DIAGNOSIS — K21.9 GASTROESOPHAGEAL REFLUX DISEASE, UNSPECIFIED WHETHER ESOPHAGITIS PRESENT: ICD-10-CM

## 2022-06-20 RX ORDER — TAMSULOSIN HYDROCHLORIDE 0.4 MG/1
1 CAPSULE ORAL DAILY
Qty: 90 CAPSULE | Refills: 1 | Status: SHIPPED | OUTPATIENT
Start: 2022-06-20 | End: 2022-08-05

## 2022-06-20 RX ORDER — FAMOTIDINE 20 MG/1
20 TABLET, FILM COATED ORAL 2 TIMES DAILY
Qty: 180 TABLET | Refills: 0 | Status: SHIPPED | OUTPATIENT
Start: 2022-06-20 | End: 2022-08-05 | Stop reason: SDUPTHER

## 2022-06-20 NOTE — TELEPHONE ENCOUNTER
----- Message from Sergio Sandoval sent at 6/19/2022  3:58 PM CDT -----  Regarding: TAMSULOSIN 0.4 MG ORAL CAP  I am out of refills. The label says one more, but the pharmacy says no.   could you renew the script ([facility] a 90 day supply with refills?)

## 2022-06-20 NOTE — TELEPHONE ENCOUNTER
----- Message from Sergio Sandoval sent at 6/20/2022  7:45 AM CDT -----  Regarding: prescription renew  I am out of refills for my Famotidine 20 MG I have enough to finish out the week

## 2022-06-23 ENCOUNTER — TELEPHONE (OUTPATIENT)
Dept: FAMILY MEDICINE CLINIC | Facility: CLINIC | Age: 67
End: 2022-06-23

## 2022-06-24 DIAGNOSIS — M89.8X1 PAIN OF LEFT SCAPULA: ICD-10-CM

## 2022-06-24 DIAGNOSIS — M25.512 CHRONIC LEFT SHOULDER PAIN: ICD-10-CM

## 2022-06-24 DIAGNOSIS — G89.29 CHRONIC BILATERAL LOW BACK PAIN WITH BILATERAL SCIATICA: Chronic | ICD-10-CM

## 2022-06-24 DIAGNOSIS — M54.41 CHRONIC BILATERAL LOW BACK PAIN WITH BILATERAL SCIATICA: Chronic | ICD-10-CM

## 2022-06-24 DIAGNOSIS — G89.29 CHRONIC LEFT SHOULDER PAIN: ICD-10-CM

## 2022-06-24 DIAGNOSIS — M47.816 LUMBAR SPONDYLOSIS: ICD-10-CM

## 2022-06-24 DIAGNOSIS — M54.42 CHRONIC BILATERAL LOW BACK PAIN WITH BILATERAL SCIATICA: Chronic | ICD-10-CM

## 2022-06-24 DIAGNOSIS — Z98.890 HISTORY OF SHOULDER SURGERY: ICD-10-CM

## 2022-07-25 ENCOUNTER — CLINICAL SUPPORT (OUTPATIENT)
Dept: FAMILY MEDICINE CLINIC | Facility: CLINIC | Age: 67
End: 2022-07-25

## 2022-07-25 DIAGNOSIS — Z20.822 EXPOSURE TO COVID-19 VIRUS: ICD-10-CM

## 2022-07-25 LAB
EXPIRATION DATE: NORMAL
FLUAV AG UPPER RESP QL IA.RAPID: NOT DETECTED
FLUBV AG UPPER RESP QL IA.RAPID: NOT DETECTED
INTERNAL CONTROL: NORMAL
Lab: NORMAL
SARS-COV-2 AG UPPER RESP QL IA.RAPID: NOT DETECTED

## 2022-07-25 PROCEDURE — 87428 SARSCOV & INF VIR A&B AG IA: CPT | Performed by: STUDENT IN AN ORGANIZED HEALTH CARE EDUCATION/TRAINING PROGRAM

## 2022-07-25 PROCEDURE — 99211 OFF/OP EST MAY X REQ PHY/QHP: CPT | Performed by: STUDENT IN AN ORGANIZED HEALTH CARE EDUCATION/TRAINING PROGRAM

## 2022-07-25 PROCEDURE — 87635 SARS-COV-2 COVID-19 AMP PRB: CPT | Performed by: STUDENT IN AN ORGANIZED HEALTH CARE EDUCATION/TRAINING PROGRAM

## 2022-07-25 NOTE — PROGRESS NOTES
Patient presented to clinic for covid testing only. Patient directly exposed to brother who is COVID positive. Rapid Covid test completed, patient asked for PCR to confirm negative test.

## 2022-07-26 LAB — SARS-COV-2 N GENE RESP QL NAA+PROBE: NOT DETECTED

## 2022-08-05 ENCOUNTER — OFFICE VISIT (OUTPATIENT)
Dept: FAMILY MEDICINE CLINIC | Facility: CLINIC | Age: 67
End: 2022-08-05

## 2022-08-05 VITALS
BODY MASS INDEX: 25.77 KG/M2 | WEIGHT: 180 LBS | OXYGEN SATURATION: 98 % | HEIGHT: 70 IN | HEART RATE: 98 BPM | SYSTOLIC BLOOD PRESSURE: 110 MMHG | DIASTOLIC BLOOD PRESSURE: 68 MMHG | TEMPERATURE: 97.3 F

## 2022-08-05 DIAGNOSIS — G89.29 CHRONIC MIDLINE LOW BACK PAIN, UNSPECIFIED WHETHER SCIATICA PRESENT: ICD-10-CM

## 2022-08-05 DIAGNOSIS — R39.198 DIFFICULTY URINATING: ICD-10-CM

## 2022-08-05 DIAGNOSIS — M25.512 LEFT SHOULDER PAIN, UNSPECIFIED CHRONICITY: ICD-10-CM

## 2022-08-05 DIAGNOSIS — G89.29 CHRONIC BILATERAL LOW BACK PAIN WITH BILATERAL SCIATICA: Chronic | ICD-10-CM

## 2022-08-05 DIAGNOSIS — R79.9 ABNORMAL FINDING OF BLOOD CHEMISTRY, UNSPECIFIED: ICD-10-CM

## 2022-08-05 DIAGNOSIS — M25.50 MULTIPLE JOINT PAIN: ICD-10-CM

## 2022-08-05 DIAGNOSIS — M54.2 CHRONIC NECK PAIN: Chronic | ICD-10-CM

## 2022-08-05 DIAGNOSIS — E55.9 VITAMIN D DEFICIENCY: ICD-10-CM

## 2022-08-05 DIAGNOSIS — N40.1 BENIGN PROSTATIC HYPERPLASIA WITH LOWER URINARY TRACT SYMPTOMS, SYMPTOM DETAILS UNSPECIFIED: ICD-10-CM

## 2022-08-05 DIAGNOSIS — K21.9 GASTROESOPHAGEAL REFLUX DISEASE, UNSPECIFIED WHETHER ESOPHAGITIS PRESENT: ICD-10-CM

## 2022-08-05 DIAGNOSIS — M89.8X1 PAIN OF LEFT SCAPULA: ICD-10-CM

## 2022-08-05 DIAGNOSIS — M51.36 DDD (DEGENERATIVE DISC DISEASE), LUMBAR: ICD-10-CM

## 2022-08-05 DIAGNOSIS — E78.2 MIXED HYPERLIPIDEMIA: ICD-10-CM

## 2022-08-05 DIAGNOSIS — G89.29 CHRONIC NECK PAIN: Chronic | ICD-10-CM

## 2022-08-05 DIAGNOSIS — M77.8 LEFT SHOULDER TENDONITIS: ICD-10-CM

## 2022-08-05 DIAGNOSIS — M48.061 SPINAL STENOSIS OF LUMBAR REGION, UNSPECIFIED WHETHER NEUROGENIC CLAUDICATION PRESENT: Primary | ICD-10-CM

## 2022-08-05 DIAGNOSIS — I48.91 ATRIAL FIBRILLATION, CONTROLLED: ICD-10-CM

## 2022-08-05 DIAGNOSIS — M54.42 CHRONIC BILATERAL LOW BACK PAIN WITH BILATERAL SCIATICA: Chronic | ICD-10-CM

## 2022-08-05 DIAGNOSIS — M54.41 CHRONIC BILATERAL LOW BACK PAIN WITH BILATERAL SCIATICA: Chronic | ICD-10-CM

## 2022-08-05 DIAGNOSIS — I10 ESSENTIAL HYPERTENSION: ICD-10-CM

## 2022-08-05 DIAGNOSIS — M54.50 CHRONIC MIDLINE LOW BACK PAIN, UNSPECIFIED WHETHER SCIATICA PRESENT: ICD-10-CM

## 2022-08-05 PROCEDURE — 99214 OFFICE O/P EST MOD 30 MIN: CPT | Performed by: FAMILY MEDICINE

## 2022-08-05 RX ORDER — TAMSULOSIN HYDROCHLORIDE 0.4 MG/1
1 CAPSULE ORAL DAILY
Qty: 90 CAPSULE | Refills: 1 | Status: SHIPPED | OUTPATIENT
Start: 2022-08-05 | End: 2022-10-19 | Stop reason: SDUPTHER

## 2022-08-05 RX ORDER — TELMISARTAN AND HYDROCHLORTHIAZIDE 40; 12.5 MG/1; MG/1
1 TABLET ORAL DAILY
Qty: 90 TABLET | Refills: 1 | Status: SHIPPED | OUTPATIENT
Start: 2022-08-05 | End: 2022-10-19

## 2022-08-05 RX ORDER — DULOXETIN HYDROCHLORIDE 60 MG/1
60 CAPSULE, DELAYED RELEASE ORAL DAILY
Qty: 60 CAPSULE | Refills: 3 | Status: SHIPPED | OUTPATIENT
Start: 2022-08-05 | End: 2022-10-04

## 2022-08-05 RX ORDER — METOPROLOL SUCCINATE 50 MG/1
50 TABLET, EXTENDED RELEASE ORAL DAILY
Qty: 90 TABLET | Refills: 3 | Status: SHIPPED | OUTPATIENT
Start: 2022-08-05 | End: 2022-10-19

## 2022-08-05 RX ORDER — SIMVASTATIN 20 MG
20 TABLET ORAL NIGHTLY
Qty: 90 TABLET | Refills: 1 | Status: SHIPPED | OUTPATIENT
Start: 2022-08-05 | End: 2022-10-19

## 2022-08-05 RX ORDER — FAMOTIDINE 20 MG/1
20 TABLET, FILM COATED ORAL 2 TIMES DAILY
Qty: 180 TABLET | Refills: 0 | Status: SHIPPED | OUTPATIENT
Start: 2022-08-05 | End: 2022-10-19 | Stop reason: SDUPTHER

## 2022-08-05 RX ORDER — METFORMIN HYDROCHLORIDE 500 MG/1
500 TABLET, EXTENDED RELEASE ORAL
Qty: 30 TABLET | Refills: 3 | Status: SHIPPED | OUTPATIENT
Start: 2022-08-05 | End: 2022-10-19 | Stop reason: SDUPTHER

## 2022-08-05 RX ORDER — GABAPENTIN 100 MG/1
100 CAPSULE ORAL NIGHTLY PRN
Qty: 90 CAPSULE | Refills: 1 | Status: SHIPPED | OUTPATIENT
Start: 2022-08-05 | End: 2022-08-10 | Stop reason: SDUPTHER

## 2022-08-05 NOTE — PATIENT INSTRUCTIONS
Get copies of MRI of shoulder and back to bring to specialist    Will refer to Dr. Crawford with spine surgery for back pain    Will refer to Orthopedic Jayne KHAN for shoulder pain    Will refer to Urology Dr. Jon for prostate and difficulty urinating    Recheck in 2 months

## 2022-08-09 DIAGNOSIS — G89.29 CHRONIC NECK PAIN: Chronic | ICD-10-CM

## 2022-08-09 DIAGNOSIS — M54.42 CHRONIC BILATERAL LOW BACK PAIN WITH BILATERAL SCIATICA: Chronic | ICD-10-CM

## 2022-08-09 DIAGNOSIS — M54.2 CHRONIC NECK PAIN: Chronic | ICD-10-CM

## 2022-08-09 DIAGNOSIS — M54.41 CHRONIC BILATERAL LOW BACK PAIN WITH BILATERAL SCIATICA: Chronic | ICD-10-CM

## 2022-08-09 DIAGNOSIS — G89.29 CHRONIC BILATERAL LOW BACK PAIN WITH BILATERAL SCIATICA: Chronic | ICD-10-CM

## 2022-08-10 DIAGNOSIS — M54.41 CHRONIC BILATERAL LOW BACK PAIN WITH BILATERAL SCIATICA: Chronic | ICD-10-CM

## 2022-08-10 DIAGNOSIS — M54.42 CHRONIC BILATERAL LOW BACK PAIN WITH BILATERAL SCIATICA: Chronic | ICD-10-CM

## 2022-08-10 DIAGNOSIS — M54.2 CHRONIC NECK PAIN: Chronic | ICD-10-CM

## 2022-08-10 DIAGNOSIS — G89.29 CHRONIC NECK PAIN: Chronic | ICD-10-CM

## 2022-08-10 DIAGNOSIS — G89.29 CHRONIC BILATERAL LOW BACK PAIN WITH BILATERAL SCIATICA: Chronic | ICD-10-CM

## 2022-08-10 RX ORDER — GABAPENTIN 100 MG/1
100 CAPSULE ORAL NIGHTLY PRN
Qty: 90 CAPSULE | Refills: 1 | Status: SHIPPED | OUTPATIENT
Start: 2022-08-10 | End: 2022-10-19

## 2022-08-10 RX ORDER — GABAPENTIN 100 MG/1
100 CAPSULE ORAL NIGHTLY PRN
Qty: 90 CAPSULE | Refills: 1 | Status: SHIPPED | OUTPATIENT
Start: 2022-08-10 | End: 2022-08-10 | Stop reason: SDUPTHER

## 2022-08-16 ENCOUNTER — OFFICE VISIT (OUTPATIENT)
Dept: ORTHOPEDIC SURGERY | Facility: CLINIC | Age: 67
End: 2022-08-16

## 2022-08-16 VITALS — HEIGHT: 70 IN | BODY MASS INDEX: 25.34 KG/M2 | WEIGHT: 177 LBS

## 2022-08-16 DIAGNOSIS — M25.512 CHRONIC LEFT SHOULDER PAIN: Primary | ICD-10-CM

## 2022-08-16 DIAGNOSIS — G89.29 CHRONIC LEFT SHOULDER PAIN: Primary | ICD-10-CM

## 2022-08-16 DIAGNOSIS — M75.52 BURSITIS OF LEFT SHOULDER: ICD-10-CM

## 2022-08-16 DIAGNOSIS — M67.912 TENDINOPATHY OF LEFT ROTATOR CUFF: ICD-10-CM

## 2022-08-16 PROCEDURE — 99213 OFFICE O/P EST LOW 20 MIN: CPT | Performed by: NURSE PRACTITIONER

## 2022-08-16 PROCEDURE — 20610 DRAIN/INJ JOINT/BURSA W/O US: CPT | Performed by: NURSE PRACTITIONER

## 2022-08-16 RX ORDER — LIDOCAINE HYDROCHLORIDE 10 MG/ML
2 INJECTION, SOLUTION INFILTRATION; PERINEURAL
Status: COMPLETED | OUTPATIENT
Start: 2022-08-16 | End: 2022-08-16

## 2022-08-16 RX ORDER — TRIAMCINOLONE ACETONIDE 40 MG/ML
40 INJECTION, SUSPENSION INTRA-ARTICULAR; INTRAMUSCULAR
Status: COMPLETED | OUTPATIENT
Start: 2022-08-16 | End: 2022-08-16

## 2022-08-16 RX ADMIN — LIDOCAINE HYDROCHLORIDE 2 ML: 10 INJECTION, SOLUTION INFILTRATION; PERINEURAL at 14:48

## 2022-08-16 RX ADMIN — TRIAMCINOLONE ACETONIDE 40 MG: 40 INJECTION, SUSPENSION INTRA-ARTICULAR; INTRAMUSCULAR at 14:48

## 2022-08-16 NOTE — PROGRESS NOTES
Sergio Sandoval is a 67 y.o. male   Primary provider:  Oneil Jorgensen MD       Chief Complaint   Patient presents with   • Left Shoulder - Shoulder Pain       HISTORY OF PRESENT ILLNESS:      Mr. Rois is a 67-year-old male who presents today with complaints of left shoulder pain.  Patient reports shoulder pain has been present for about the past 9 months or so.  In 2006 he had a subacromial decompression and Karan procedure of his left shoulder.  He also reports having a rotator cuff repair in 2018 on his right shoulder.  He reports some pain in right shoulder but not like his left.  Left shoulder pain is moderate and intermittent.  It is associated with popping.  Overhead activity and reaching behind his back as well as walking and driving aggravate pain.  He has tried rice therapy with some relief of symptoms.  He cannot take NSAIDs due to current use of blood thinners.  He has an old HEP taught to him by physical therapy for previous shoulder issues, however HEP has recently caused him more pain.  No fever, nausea, vomiting.  No injury or trauma.  Patient reports some burning and tingling, though this is localized to his shoulder and does not in his hand or any other part of his arm.     Arm Pain   The incident occurred more than 1 week ago. There was no injury mechanism. The pain is present in the left shoulder. The quality of the pain is described as aching, stabbing and burning. The pain is at a severity of 4/10. The pain is moderate. The pain has been intermittent since the incident. Associated symptoms comments: CLICKING, POPPING. . Exacerbated by: DRIVING, WALKING.  He has tried ice for the symptoms.        CONCURRENT MEDICAL HISTORY:    Past Medical History:   Diagnosis Date   • Atrial fibrillation (HCC)    • DDD (degenerative disc disease), thoracic    • Hyperlipidemia    • Hypertension    • Lipoma of skin        No Known Allergies      Current Outpatient Medications:   •  Acetaminophen (TYLENOL 8  HOUR PO), Take 1,000 mg by mouth 3 (Three) Times a Day., Disp: , Rfl:   •  apixaban (ELIQUIS) 5 MG tablet tablet, Take 1 tablet by mouth Every 12 (Twelve) Hours., Disp: 180 tablet, Rfl: 3  •  DULoxetine (Cymbalta) 60 MG capsule, Take 1 capsule by mouth Daily., Disp: 60 capsule, Rfl: 3  •  famotidine (Pepcid) 20 MG tablet, Take 1 tablet by mouth 2 (Two) Times a Day., Disp: 180 tablet, Rfl: 0  •  fluticasone (FLONASE) 50 MCG/ACT nasal spray, 2 sprays into the nostril(s) as directed by provider Daily., Disp: , Rfl:   •  gabapentin (NEURONTIN) 100 MG capsule, Take 1 capsule by mouth At Night As Needed (pain) for up to 90 days., Disp: 90 capsule, Rfl: 1  •  Glucosamine-Chondroit-Vit C-Mn (GLUCOSAMINE 1500 COMPLEX PO), Take 1,500 mg by mouth 2 (two) times a day., Disp: , Rfl:   •  metFORMIN ER (GLUCOPHAGE-XR) 500 MG 24 hr tablet, Take 1 tablet by mouth Daily With Breakfast., Disp: 30 tablet, Rfl: 3  •  metoprolol succinate XL (TOPROL-XL) 50 MG 24 hr tablet, Take 1 tablet by mouth Daily., Disp: 90 tablet, Rfl: 3  •  Multiple Vitamins-Minerals (MULTIVITAMIN WITH MINERALS) tablet tablet, Take 1 tablet by mouth Daily. GETS OTC, Disp: , Rfl:   •  Saw Palmetto 450 MG capsule, Take  by mouth 2 (two) times a day. GETS OTC, Disp: , Rfl:   •  simvastatin (Zocor) 20 MG tablet, Take 1 tablet by mouth Every Night., Disp: 90 tablet, Rfl: 1  •  tamsulosin (Flomax) 0.4 MG capsule 24 hr capsule, Take 1 capsule by mouth Daily., Disp: 90 capsule, Rfl: 1  •  telmisartan-hydrochlorothiazide (MICARDIS HCT) 40-12.5 MG per tablet, Take 1 tablet by mouth Daily., Disp: 90 tablet, Rfl: 1  •  VITAMIN D, ERGOCALCIFEROL, PO, Take  by mouth. GETS OTC, Disp: , Rfl:     Past Surgical History:   Procedure Laterality Date   • CARDIAC ABLATION     • COLONOSCOPY     • SHOULDER SURGERY     • UPPER GASTROINTESTINAL ENDOSCOPY      with esophageal dilation   • WISDOM TOOTH EXTRACTION         Family History   Problem Relation Age of Onset   • Arthritis Mother   "  • Heart disease Mother    • Rheumatic fever Mother    • Hyperlipidemia Father    • Hypertension Father    • Diabetes Father    • Stroke Father    • Prostate cancer Father    • Skin cancer Father    • Cancer Sister    • Alcohol abuse Brother    • No Known Problems Daughter    • No Known Problems Son    • Prostate cancer Paternal Grandfather    • No Known Problems Sister         Social History     Socioeconomic History   • Marital status:    Tobacco Use   • Smoking status: Never Smoker   • Smokeless tobacco: Never Used   Vaping Use   • Vaping Use: Never used   Substance and Sexual Activity   • Alcohol use: Not Currently     Comment: none for 20 years   • Drug use: Never   • Sexual activity: Defer        Review of Systems    Left shoulder pain.    PHYSICAL EXAMINATION:       Ht 177.8 cm (70\")   Wt 80.3 kg (177 lb)   BMI 25.40 kg/m²     Physical Exam  Vitals and nursing note reviewed.   Constitutional:       General: He is not in acute distress.     Appearance: He is well-developed. He is not toxic-appearing.   HENT:      Head: Normocephalic.   Pulmonary:      Effort: Pulmonary effort is normal. No respiratory distress.   Skin:     General: Skin is warm and dry.   Neurological:      Mental Status: He is alert and oriented to person, place, and time.   Psychiatric:         Mood and Affect: Mood normal.         Behavior: Behavior normal.         Thought Content: Thought content normal.         Judgment: Judgment normal.         GAIT:     []  Normal  []  Antalgic    Assistive device: []  None  []  Walker     []  Crutches  []  Cane     []  Wheelchair  []  Stretcher    Left Shoulder Exam     Tenderness   The patient is experiencing tenderness in the acromion and acromioclavicular joint.    Range of Motion   Active abduction: 160   Extension: 40   External rotation: 90   Forward flexion: 160   Internal rotation 90 degrees: 80     Tests   Cross arm: negative  Impingement: negative    Other   Erythema: " absent  Sensation: normal  Pulse: present     Comments:  Positive full/empty/liftoff test.  No deformity.  No evidence of infection.  Neurovascular intact.              No results found.    Study Result    Narrative & Impression   Left shoulder three views Neeta 15, 2022     INDICATION: Pain     FINDINGS:  Bones normally mineralized.  Status post distal clavicular resection.  No fracture or dislocation.  No focal bony lesions.  No significant soft tissue abnormality.     IMPRESSION:  Postoperative distal left clavicle.  No acute bony abnormality.     Electronically signed by:  Sancho Hull MD  6/17/2022 7:25 AM  CDT Workstation: [id number]             ASSESSMENT:    Diagnoses and all orders for this visit:    Chronic left shoulder pain    Tendinopathy of left rotator cuff    Bursitis of left shoulder    Other orders  -     Large Joint Arthrocentesis: L subacromial bursa      Large Joint Arthrocentesis: L subacromial bursa  Date/Time: 8/16/2022 2:48 PM  Consent given by: patient  Site marked: site marked  Timeout: Immediately prior to procedure a time out was called to verify the correct patient, procedure, equipment, support staff and site/side marked as required   Supporting Documentation  Indications: pain   Procedure Details  Location: shoulder - L subacromial bursa  Preparation: Patient was prepped and draped in the usual sterile fashion  Needle size: 22 G  Approach: posterior  Medications administered: 40 mg triamcinolone acetonide 40 MG/ML; 2 mL lidocaine 1 %  Patient tolerance: patient tolerated the procedure well with no immediate complications            PLAN    MRI results reviewed with patient.  Patient is unable to take NSAIDs due to blood thinner use.  After discussing risk, benefits, alternatives, patient desires to proceed with subacromial injection today.  Patient tolerated injection well.  Patient instructed to rest arm for the next 3 to 5 days and then restart home exercise program.  Patient  instructed to start low and slow.  Signs and symptoms to report and when to seek care explained to patient.  Patient verbalized understanding and is to return in 4 weeks for recheck.    No follow-ups on file.    EMR Dragon/Transciption Disclaimer: Some of this note may be an electronic transcription/translation of spoken language to printed text.  The electronic translation of spoken language may permit erroneous, or at times, nonsensical words or phrases to be inadvertently transcribed. Although I have reviewed the note for such errors, some may still exist.     Time spent of a minimum of 30 minutes including the face to face evaluation, reviewing of medical history and prior medial records, reviewing of diagnostic studies, ordering additional tests, documentation, patient education and coordination of care.       This document has been electronically signed by Jayne KHAN on August 16, 2022 14:59 CDT

## 2022-09-02 ENCOUNTER — TRANSCRIBE ORDERS (OUTPATIENT)
Dept: LAB | Facility: HOSPITAL | Age: 67
End: 2022-09-02

## 2022-09-02 ENCOUNTER — LAB (OUTPATIENT)
Dept: LAB | Facility: HOSPITAL | Age: 67
End: 2022-09-02

## 2022-09-02 DIAGNOSIS — N40.1 BENIGN PROSTATIC HYPERPLASIA WITH LOWER URINARY TRACT SYMPTOMS, SYMPTOM DETAILS UNSPECIFIED: ICD-10-CM

## 2022-09-02 DIAGNOSIS — N40.1 BENIGN PROSTATIC HYPERPLASIA WITH LOWER URINARY TRACT SYMPTOMS, SYMPTOM DETAILS UNSPECIFIED: Primary | ICD-10-CM

## 2022-09-02 PROCEDURE — 84153 ASSAY OF PSA TOTAL: CPT

## 2022-09-03 LAB — PSA SERPL-MCNC: 1.24 NG/ML (ref 0–4)

## 2022-09-08 ENCOUNTER — OFFICE VISIT (OUTPATIENT)
Dept: CARDIOLOGY | Facility: CLINIC | Age: 67
End: 2022-09-08

## 2022-09-08 VITALS
HEART RATE: 80 BPM | SYSTOLIC BLOOD PRESSURE: 126 MMHG | BODY MASS INDEX: 25.34 KG/M2 | WEIGHT: 177 LBS | DIASTOLIC BLOOD PRESSURE: 76 MMHG | TEMPERATURE: 97.1 F | OXYGEN SATURATION: 98 % | HEIGHT: 70 IN

## 2022-09-08 DIAGNOSIS — I48.11 LONGSTANDING PERSISTENT ATRIAL FIBRILLATION: Primary | ICD-10-CM

## 2022-09-08 DIAGNOSIS — E78.2 MIXED HYPERLIPIDEMIA: ICD-10-CM

## 2022-09-08 DIAGNOSIS — R73.03 PREDIABETES: ICD-10-CM

## 2022-09-08 DIAGNOSIS — I10 ESSENTIAL HYPERTENSION: ICD-10-CM

## 2022-09-08 PROCEDURE — 99214 OFFICE O/P EST MOD 30 MIN: CPT | Performed by: INTERNAL MEDICINE

## 2022-09-08 NOTE — PROGRESS NOTES
Sergio Sandoval  67 y.o. male      1. Longstanding persistent atrial fibrillation (HCC)    2. Essential hypertension    3. Mixed hyperlipidemia    4. Prediabetes        History of Present Illness  Sergoi Sandoval is a 67-year-old male who was seen by me for the first time back in September 2021.  He has a long and complex cardiac history.  It appears that he was diagnosed to have supraventricular tachycardia when he was stationed in [place] in the late 1990s.  He was treated with beta-blockers and later in 1998 while in [place] appears to have undergone ablation for either SVT or atrial fibrillation.  Subsequently he was noted to have atrial fibrillation and underwent cryoablation in 2012.  An MRI report from 2012 was available for review and the results are as follows:     1. Atrial fibrillation, status post pulmonary vein isolation.      2. Normal left ventricular chamber size and systolic function. LVEF      56%.      3. Normal right ventricular chamber size and systolic function.      4. No significant valvular abnormalities.      5. Normal pulmonary vein anatomy; left superior 14 mm, left inferior 15      mm, right superior 8 mm, right inferior 21 mm (including small      accessory vein).        His other medical issues include back pain, degenerative joint disease, hypertension, borderline diabetes/prediabetes, depression.  He has no previous documented coronary artery disease or valvular heart disease.    Lexiscan Cardiolite stress test in September 2021 showed:  · Findings consistent with an equivocal ECG stress test.  · Left ventricular ejection fraction is normal. (Calculated EF = 67%).  · Myocardial perfusion imaging indicates a normal myocardial perfusion study with no evidence of ischemia.  · Impressions are consistent with a low risk study.    Echocardiogram in October 2021 showed:  · Left ventricular wall thickness is consistent with moderate concentric hypertrophy.  · Estimated left ventricular EF =  57% Left ventricular ejection fraction appears to be 56 - 60%. Left ventricular systolic function is normal.  · Estimated right ventricular systolic pressure from tricuspid regurgitation is normal (<35 mmHg).    He has progressed well and denied any cardiac symptoms at this time with no chest pain, shortness of breath or palpitation.  His heart rate is under much better control, since he has been on metoprolol succinate.  His main issues relate to osteoarthritis.    No Known Allergies      Past Medical History:   Diagnosis Date   • Atrial fibrillation (HCC)    • DDD (degenerative disc disease), thoracic    • Hyperlipidemia    • Hypertension    • Lipoma of skin          Past Surgical History:   Procedure Laterality Date   • CARDIAC ABLATION     • COLONOSCOPY     • SHOULDER SURGERY     • UPPER GASTROINTESTINAL ENDOSCOPY      with esophageal dilation   • WISDOM TOOTH EXTRACTION           Family History   Problem Relation Age of Onset   • Arthritis Mother    • Heart disease Mother    • Rheumatic fever Mother    • Hyperlipidemia Father    • Hypertension Father    • Diabetes Father    • Stroke Father    • Prostate cancer Father    • Skin cancer Father    • Cancer Sister    • Alcohol abuse Brother    • No Known Problems Daughter    • No Known Problems Son    • Prostate cancer Paternal Grandfather    • No Known Problems Sister          Social History     Socioeconomic History   • Marital status:    Tobacco Use   • Smoking status: Never Smoker   • Smokeless tobacco: Never Used   Vaping Use   • Vaping Use: Never used   Substance and Sexual Activity   • Alcohol use: Not Currently     Comment: none for 20 years   • Drug use: Never   • Sexual activity: Defer         Current Outpatient Medications   Medication Sig Dispense Refill   • Acetaminophen (TYLENOL 8 HOUR PO) Take 1,000 mg by mouth 3 (Three) Times a Day.     • apixaban (ELIQUIS) 5 MG tablet tablet Take 1 tablet by mouth Every 12 (Twelve) Hours. 180 tablet 3   •  "DULoxetine (Cymbalta) 60 MG capsule Take 1 capsule by mouth Daily. 60 capsule 3   • famotidine (Pepcid) 20 MG tablet Take 1 tablet by mouth 2 (Two) Times a Day. 180 tablet 0   • fluticasone (FLONASE) 50 MCG/ACT nasal spray 2 sprays into the nostril(s) as directed by provider Daily.     • gabapentin (NEURONTIN) 100 MG capsule Take 1 capsule by mouth At Night As Needed (pain) for up to 90 days. 90 capsule 1   • Glucosamine-Chondroit-Vit C-Mn (GLUCOSAMINE 1500 COMPLEX PO) Take 1,500 mg by mouth 2 (two) times a day.     • metFORMIN ER (GLUCOPHAGE-XR) 500 MG 24 hr tablet Take 1 tablet by mouth Daily With Breakfast. 30 tablet 3   • metoprolol succinate XL (TOPROL-XL) 50 MG 24 hr tablet Take 1 tablet by mouth Daily. 90 tablet 3   • Multiple Vitamins-Minerals (MULTIVITAMIN WITH MINERALS) tablet tablet Take 1 tablet by mouth Daily. GETS OTC     • Saw Palmetto 450 MG capsule Take  by mouth 2 (two) times a day. GETS OTC     • simvastatin (Zocor) 20 MG tablet Take 1 tablet by mouth Every Night. 90 tablet 1   • tamsulosin (Flomax) 0.4 MG capsule 24 hr capsule Take 1 capsule by mouth Daily. 90 capsule 1   • telmisartan-hydrochlorothiazide (MICARDIS HCT) 40-12.5 MG per tablet Take 1 tablet by mouth Daily. 90 tablet 1   • VITAMIN D, ERGOCALCIFEROL, PO Take  by mouth. GETS OTC       No current facility-administered medications for this visit.         OBJECTIVE    /76 (BP Location: Left arm, Patient Position: Sitting, Cuff Size: Adult)   Pulse 80   Temp 97.1 °F (36.2 °C)   Ht 177.8 cm (70\")   Wt 80.3 kg (177 lb)   SpO2 98%   BMI 25.40 kg/m²         Review of Systems: The following systems were reviewed and changes noted as indicated below    Constitutional:  Denies recent weight loss, weight gain, fever or chills     HENT:  Denies any hearing loss, epistaxis, hoarseness, or difficulty speaking.     Eyes: Wears eyeglasses or contact lenses     Respiratory: no cough, wheezing, or hemoptysis.     Cardiovascular: See " HPI    Gastrointestinal:  Denies change in bowel habits, dyspepsia, ulcer disease, hematochezia, or melena.     Endocrine: Negative for cold intolerance, heat intolerance, polydipsia, polyphagia and polyuria.     Genitourinary: Negative.      Musculoskeletal: DJD.  Chronic back pain    Neurological:  Denies any history of recurrent headaches, strokes, TIA, or seizure disorder.     Hematological: Denies any food allergies, seasonal allergies, bleeding disorders, or lymphadenopathy.  History of thrombocytopenia    Psychiatric/Behavioral: Denies any history of depression, substance abuse, or change in cognitive function.       Physical Exam: The following systems are reviewed and no changes noted    Constitutional: Cooperative, alert and oriented, well-developed, well-nourished, in no acute distress.     HENT:   Head: Normocephalic, normal hair patterns, no masses or tenderness.  Ears, Nose, and Throat: No gross abnormalities. No pallor or cyanosis. Dentition good.   Eyes: EOMS intact, PERRL, conjunctivae and lids unremarkable. Fundoscopic exam and visual fields not performed.   Neck: No palpable masses or adenopathy, no thyromegaly, no JVD, carotid pulses are full and equal bilaterally and without bruit.     Cardiovascular: irregular rhythm, S1 variable, S2 normal, no S3 or S4.  No murmurs, gallops, or rubs detected.     Pulmonary/Chest: Chest: normal symmetry, normal respiratory excursion, no intercostal retraction, no use of accessory muscles.     Pulmonary: Normal breath sounds. No rales or rhonchi.    Abdominal: Abdomen soft, bowel sounds normoactive, no masses, no hepatosplenomegaly, nontender, no bruit.     Musculoskeletal: No deformities, clubbing, cyanosis, erythema, or edema observed.     Neurological: No gross motor or sensory deficits noted, affect appropriate, oriented to time, person, place.     Skin: Warm and dry to the touch, no apparent skin lesion or mass noted.     Psychiatric: He has a normal mood  and affect. His behavior is normal. Judgment and thought content normal.         Procedures      Lab Results   Component Value Date    WBC 5.20 06/15/2022    HGB 14.4 06/15/2022    HCT 42.9 06/15/2022    MCV 88.8 06/15/2022     06/15/2022     Lab Results   Component Value Date    GLUCOSE 98 06/15/2022    BUN 13 06/15/2022    CREATININE 1.07 06/15/2022    EGFRIFNONA 73 01/24/2022    BCR 12.1 06/15/2022    CO2 24.8 06/15/2022    CALCIUM 9.3 06/15/2022    ALBUMIN 4.60 06/15/2022    AST 24 06/15/2022    ALT 18 06/15/2022     Lab Results   Component Value Date    CHOL 121 06/15/2022    CHOL 135 09/29/2021    CHOL 138 06/30/2021     Lab Results   Component Value Date    TRIG 54 06/15/2022    TRIG 52 09/29/2021    TRIG 51 06/30/2021     Lab Results   Component Value Date    HDL 57 06/15/2022    HDL 57 09/29/2021    HDL 54 06/30/2021     No components found for: LDLCALC  Lab Results   Component Value Date    LDL 52 06/15/2022    LDL 66 09/29/2021    LDL 73 06/30/2021     No results found for: HDLLDLRATIO  No components found for: CHOLHDL  Lab Results   Component Value Date    HGBA1C 6.20 (H) 06/15/2022     Lab Results   Component Value Date    TSH 0.518 08/31/2020           ASSESSMENT AND PLAN  Sergio Sandoval is a 67-year-old male with history of hypertension, hyperlipidemia, degenerative joint disease, cardiac arrhythmias including supraventricular tachycardia in the remote past and currently atrial fibrillation which appears to be persistent and present at least since 1998.  He has failed 2 previous ablations for atrial fibrillation.     He is stable from a cardiac standpoint and his predominant symptoms relates to arthritis.   I have continued metoprolol succinate for rate control, anticoagulation with Eliquis, lipid-lowering therapy with simvastatin and antihypertensive therapy with telmisartan HCTZ.    His lab results from June 2022 were reviewed and LDL was 52 and HDL 57.  CBC and CMP were within normal  limits.    Diagnoses and all orders for this visit:    1. Longstanding persistent atrial fibrillation (HCC) (Primary)    2. Essential hypertension    3. Mixed hyperlipidemia    4. Prediabetes        Patient's Body mass index is 25.4 kg/m². indicating that he is overweight (BMI 25-29.9). Obesity-related health conditions include the following: hypertension, dyslipidemias and osteoarthritis. Obesity is unchanged. BMI is is above average; BMI management plan is completed. We discussed portion control and increasing exercise..      Sergio Sandoval  reports that he has never smoked. He has never used smokeless tobacco.      Rojas Higuera MD  9/8/2022  10:42 CDT

## 2022-10-04 ENCOUNTER — TELEPHONE (OUTPATIENT)
Dept: FAMILY MEDICINE CLINIC | Facility: CLINIC | Age: 67
End: 2022-10-04

## 2022-10-04 RX ORDER — DULOXETIN HYDROCHLORIDE 30 MG/1
30 CAPSULE, DELAYED RELEASE ORAL DAILY
Qty: 14 CAPSULE | Refills: 0 | Status: SHIPPED | OUTPATIENT
Start: 2022-10-04 | End: 2022-10-19

## 2022-10-04 NOTE — TELEPHONE ENCOUNTER
Made pt aware and he voiced understanding. Will stop by office and  prescription.  
OK to stop Gabapentin. Needs to slowly taper off Cymbalta to prevent withdrawal symptoms which can be bad. Will send lower dose to take daily x 1 week, then every other day x 1 week. Then every third day x 2 doses and then stop.   
Patient called in regards to his [employer] message sent on 09/28/22. Let patient know his provider was out of office until 10/11/22. Patient would like a call back from someone so he knows what to do as he was told by neurosurgeon not to quit his medication cold, but to wing off of it. Patient also mentioned having the shakes.  Call back number: [phone]  
Spoke to pt and informed that [employer] message was forwarded to .     Pt stated he received steroid injection in his back around the nerve cord over a week ago. He is wanting to stop Gabapentin and Cymbalta but Neuro will not give him recommendations on this. Pt stated he is having the side affect of tremors now.     Would you be able to advise on this.   
unknown

## 2022-10-10 NOTE — PROGRESS NOTES
Subjective:  Sergio Sandoval is a 67 y.o. male who presents for       Patient Active Problem List   Diagnosis   • Mixed hyperlipidemia   • Essential hypertension   • Vitamin D deficiency   • Gastroesophageal reflux disease   • Benign prostatic hyperplasia with lower urinary tract symptoms   • Benign prostatic hyperplasia with weak urinary stream   • Prediabetes   • Dizziness   • Thrombocytopenia (HCC)   • Atrial fibrillation, controlled (HCC)   • Dyspnea on exertion   • History of thrombocytopenia   • History of prediabetes   • Longstanding persistent atrial fibrillation (HCC)   • Need for pneumococcal vaccine   • Multiple joint pain   • Chronic left shoulder pain   • Chronic midline low back pain   • Pain of left scapula   • Left shoulder tendonitis   • Spinal stenosis of lumbar region   • DDD (degenerative disc disease), lumbar           Current Outpatient Medications:   •  Acetaminophen (TYLENOL 8 HOUR PO), Take 1,000 mg by mouth 3 (Three) Times a Day., Disp: , Rfl:   •  apixaban (ELIQUIS) 5 MG tablet tablet, Take 1 tablet by mouth Every 12 (Twelve) Hours., Disp: 180 tablet, Rfl: 3  •  DULoxetine (Cymbalta) 30 MG capsule, Take 1 capsule by mouth Daily. Stop 60 mg Cymbalta Start 30 mg Cymbalta to taper off 1 dose Daily x 7 days, then QOD x 7days, then every 72 hrs x 3 doses and then D/C., Disp: 14 capsule, Rfl: 0  •  famotidine (Pepcid) 20 MG tablet, Take 1 tablet by mouth 2 (Two) Times a Day., Disp: 180 tablet, Rfl: 0  •  fluticasone (FLONASE) 50 MCG/ACT nasal spray, 2 sprays into the nostril(s) as directed by provider Daily., Disp: , Rfl:   •  gabapentin (NEURONTIN) 100 MG capsule, Take 1 capsule by mouth At Night As Needed (pain) for up to 90 days., Disp: 90 capsule, Rfl: 1  •  Glucosamine-Chondroit-Vit C-Mn (GLUCOSAMINE 1500 COMPLEX PO), Take 1,500 mg by mouth 2 (two) times a day., Disp: , Rfl:   •  metFORMIN ER (GLUCOPHAGE-XR) 500 MG 24 hr tablet, Take 1 tablet by mouth Daily With Breakfast., Disp: 30  tablet, Rfl: 3  •  metoprolol succinate XL (TOPROL-XL) 50 MG 24 hr tablet, Take 1 tablet by mouth Daily., Disp: 90 tablet, Rfl: 3  •  Multiple Vitamins-Minerals (MULTIVITAMIN WITH MINERALS) tablet tablet, Take 1 tablet by mouth Daily. GETS OTC, Disp: , Rfl:   •  Saw Palmetto 450 MG capsule, Take  by mouth 2 (two) times a day. GETS OTC, Disp: , Rfl:   •  simvastatin (Zocor) 20 MG tablet, Take 1 tablet by mouth Every Night., Disp: 90 tablet, Rfl: 1  •  tamsulosin (Flomax) 0.4 MG capsule 24 hr capsule, Take 1 capsule by mouth Daily., Disp: 90 capsule, Rfl: 1  •  telmisartan-hydrochlorothiazide (MICARDIS HCT) 40-12.5 MG per tablet, Take 1 tablet by mouth Daily., Disp: 90 tablet, Rfl: 1  •  VITAMIN D, ERGOCALCIFEROL, PO, Take  by mouth. GETS OTC, Disp: , Rfl:     HPI     Pt is 66 yo male with management of, GERD, HTN, HLP,  CAD, sp bilateral shoulder surgery, SVT, history of prediabetes, thrombocytopenia, atrial fibrillation sp ablation x 2 , CKD stage 2. History of COVID-19 infection, chronic left shoulder pain. Chronic lower back pain (DDD lumbar spine, mild spinal stenosis)      8/5/22 in office visit for recheck. Pt had labwork done 6/15/22 that showed Vitamin B12 is normal vitamin D normal lipid panel stable. hga1c at 6.20. CMP showed GFR at 76.5. CBC showed normal hemoglobin . He did have MRI of left shoulder on 6/23/22 that showed no complete rotator cuff tear there was mild fluid within the subacromial and subdeltoid bursa which can be seen with partial tear/tedinopathy.  Pt also had MRI of lumbar spine that showed multilevel degenerative changes with severe facet hypertrophy and 10 mm subluxation with mild canal stenosis.  He is taking neurontin at bedtime which helps him sleep. His left shoulder and lower back pain still bother him.  He would like to see spine surgeon. He also reports that he still has difficulty urinating and flow is less. He has tried flomax with no relief. Currently does not see URology      10/19/22 in office visit for recheck. Pt saw Cardiology on 9/8/22 for his longstanding A fib and is on metoprolol and eliquis.  Saw  Orthopedic for his left shoulder pain and had shoulder injection for left shoulder bursitis and tedninopathy.  Also saw Neurosurgery for his back pian. He has yet to get labwork ordered on 8/5/222. He is now off neurontin and will be off cymbalta soon.  He also saw PM and had back injections and pt will see how this works until surgery. He states he still has issues with standing for long period of time. Pt has upcoming appt with Dr. Crawford.soon. pain is about 1/10 on severity. Pt doing well in regards to heart. No chest pain no dizziness.  HR has been elevated lately. He is only taking Toprol XL 50 mg daily.  His HR has been ranging in 90s 100s range.       Atrial Fibrillation  Presents for follow-up visit. Symptoms include weakness. Symptoms are negative for an AICD problem, bradycardia, chest pain, dizziness, hypertension, hypotension, pacemaker problem, palpitations, shortness of breath, syncope and tachycardia. The symptoms have been stable. Past medical history includes atrial fibrillation.   Back Pain  This is a chronic problem. The current episode started more than 1 month ago. The problem occurs constantly. The pain is present in the gluteal and lumbar spine. The quality of the pain is described as aching. The pain is at a severity of 5/10. The symptoms are aggravated by bending and position. Stiffness is present all day. Associated symptoms include dysuria, numbness and weakness. Pertinent negatives include no abdominal pain, bladder incontinence, bowel incontinence, chest pain, fever, headaches, leg pain, paresis, paresthesias, pelvic pain, perianal numbness, tingling or weight loss. He has tried NSAIDs (neurontin cymbalta ) for the symptoms. The treatment provided moderate relief.   Shoulder Injury   The incident occurred at home. The left shoulder is affected. The  incident occurred more than 1 week ago. There was no injury mechanism. The quality of the pain is described as shooting. Associated symptoms include numbness. Pertinent negatives include no chest pain or tingling. The symptoms are aggravated by movement, overhead lifting and palpation. Treatments tried: neurontin. The treatment provided no relief.   Chronic Kidney Disease  This is a chronic problem. The current episode started more than 1 year ago. The problem occurs constantly. The problem has been unchanged. Associated symptoms include arthralgias. Pertinent negatives include no abdominal pain, anorexia, change in bowel habit, chest pain, chills, congestion, coughing, fatigue, fever, headaches, joint swelling, myalgias, nausea, neck pain, numbness, sore throat, swollen glands, urinary symptoms, vertigo, visual change, vomiting or weakness. Nothing aggravates the symptoms. He has tried nothing for the symptoms. The treatment provided no relief.   Heart Problem  This is a chronic problem. The current episode started more than 1 year ago. The problem occurs constantly. The problem has been unchanged. Associated symptoms include arthralgias and fatigue. Pertinent negatives include no abdominal pain, anorexia, change in bowel habit, chest pain, chills, congestion, coughing, diaphoresis, fever, headaches, joint swelling, myalgias, nausea, neck pain, numbness, sore throat, swollen glands, urinary symptoms, vertigo, visual change, vomiting or weakness. The symptoms are aggravated by bending. Treatments tried: flecanaide  The treatment provided significant relief.   Hypertension   This is a chronic problem. The current episode started more than 1 month ago. The problem is unchanged. The problem is uncontrolled. Pertinent negatives include no anxiety, blurred vision, chest pain, headaches, malaise/fatigue, neck pain, orthopnea, palpitations, peripheral edema, PND, shortness of breath or sweats. Risk factors for coronary  artery disease include male gender and dyslipidemia. Past treatments include beta blockers, angiotensin blockers and diuretics. Current antihypertension treatment includes beta blockers, angiotensin blockers and diuretics. The current treatment provides moderate improvement. There are no compliance problems.  There is no history of angina, kidney disease, CAD/MI, CVA, heart failure, left ventricular hypertrophy, PVD or retinopathy. There is no history of chronic renal disease, coarctation of the aorta, hyperaldosteronism, hypercortisolism, hyperparathyroidism, a hypertension causing med, pheochromocytoma, renovascular disease, sleep apnea or a thyroid problem.   Hyperlipidemia   This is a chronic problem. The current episode started more than 1 year ago. He has no history of chronic renal disease. Pertinent negatives include no chest pain or shortness of breath. The current treatment provides mild improvement of lipids. There are no compliance problems.     Male  Problem   The patient's pertinent negatives include no genital injury, genital itching, genital lesions, pelvic pain, penile discharge, penile pain, priapism, scrotal swelling or testicular pain. Primary symptoms comment: difficulty urinating. This is a new problem. The problem occurs constantly. The problem has been improving  Pertinent negatives include no abdominal pain, anorexia, chest pain, chills, constipation, coughing, diarrhea, discolored urine, dysuria, fever, flank pain, frequency, headaches, hematuria, hesitancy, joint pain, joint swelling, nausea, painful intercourse, rash, shortness of breath, urgency, urinary retention or vomiting. Nothing aggravates the symptoms. He has tried nothing for the symptoms. The treatment provided no relief. He is sexually active. There is no history of chlamydia, cryptorchidism, erectile aid use, erectile dysfunction, a femoral hernia, gonorrhea, herpes simplex, HIV, an inguinal hernia, kidney  stones, prostatitis, sickle cell disease, syphilis or varicocele. Has tried flomax and had significant relief     Review of Systems  Review of Systems   Constitutional: Positive for activity change and fatigue. Negative for appetite change, chills, diaphoresis and fever.   HENT: Negative for congestion, postnasal drip, rhinorrhea, sinus pressure, sinus pain, sneezing, sore throat, trouble swallowing and voice change.    Respiratory: Negative for cough, choking, chest tightness, shortness of breath, wheezing and stridor.    Cardiovascular: Negative for chest pain.   Gastrointestinal: Negative for diarrhea, nausea and vomiting.   Genitourinary: Positive for decreased urine volume and urgency.   Musculoskeletal: Positive for arthralgias and back pain.   Neurological: Positive for weakness and numbness. Negative for headaches.       Patient Active Problem List   Diagnosis   • Mixed hyperlipidemia   • Essential hypertension   • Vitamin D deficiency   • Gastroesophageal reflux disease   • Benign prostatic hyperplasia with lower urinary tract symptoms   • Benign prostatic hyperplasia with weak urinary stream   • Prediabetes   • Dizziness   • Thrombocytopenia (HCC)   • Atrial fibrillation, controlled (HCC)   • Dyspnea on exertion   • History of thrombocytopenia   • History of prediabetes   • Longstanding persistent atrial fibrillation (HCC)   • Need for pneumococcal vaccine   • Multiple joint pain   • Chronic left shoulder pain   • Chronic midline low back pain   • Pain of left scapula   • Left shoulder tendonitis   • Spinal stenosis of lumbar region   • DDD (degenerative disc disease), lumbar     Past Surgical History:   Procedure Laterality Date   • CARDIAC ABLATION     • COLONOSCOPY     • SHOULDER SURGERY     • UPPER GASTROINTESTINAL ENDOSCOPY      with esophageal dilation   • WISDOM TOOTH EXTRACTION       Social History     Socioeconomic History   • Marital status:    Tobacco Use   • Smoking status: Never   •  Smokeless tobacco: Never   Vaping Use   • Vaping Use: Never used   Substance and Sexual Activity   • Alcohol use: Not Currently     Comment: none for 20 years   • Drug use: Never   • Sexual activity: Defer     Family History   Problem Relation Age of Onset   • Arthritis Mother    • Heart disease Mother    • Rheumatic fever Mother    • Hyperlipidemia Father    • Hypertension Father    • Diabetes Father    • Stroke Father    • Prostate cancer Father    • Skin cancer Father    • Cancer Sister    • Alcohol abuse Brother    • No Known Problems Daughter    • No Known Problems Son    • Prostate cancer Paternal Grandfather    • No Known Problems Sister      Lab on 09/02/2022   Component Date Value Ref Range Status   • PSA 09/02/2022 1.240  0.000 - 4.000 ng/mL Final   Clinical Support on 07/25/2022   Component Date Value Ref Range Status   • SARS Antigen 07/25/2022 Not Detected  Not Detected, Presumptive Negative Final   • Influenza A Antigen ESTELLA 07/25/2022 Not Detected  Not Detected Final   • Influenza B Antigen ESTELLA 07/25/2022 Not Detected  Not Detected Final   • Internal Control 07/25/2022 Passed  Passed Final   • Lot Number 07/25/2022 [id number]   Final   • Expiration Date 07/25/2022 [id number]   Final   • COVID19 07/25/2022 Not Detected  Not Detected - Ref. Range Final   Lab on 06/15/2022   Component Date Value Ref Range Status   • WBC 06/15/2022 5.20  3.40 - 10.80 10*3/mm3 Final   • RBC 06/15/2022 4.83  4.14 - 5.80 10*6/mm3 Final   • Hemoglobin 06/15/2022 14.4  13.0 - 17.7 g/dL Final   • Hematocrit 06/15/2022 42.9  37.5 - 51.0 % Final   • MCV 06/15/2022 88.8  79.0 - 97.0 fL Final   • MCH 06/15/2022 29.8  26.6 - 33.0 pg Final   • MCHC 06/15/2022 33.6  31.5 - 35.7 g/dL Final   • RDW 06/15/2022 12.5  12.3 - 15.4 % Final   • RDW-SD 06/15/2022 39.8  37.0 - 54.0 fl Final   • MPV 06/15/2022 10.7  6.0 - 12.0 fL Final   • Platelets 06/15/2022 179  140 - 450 10*3/mm3 Final   • Neutrophil % 06/15/2022 63.8  42.7 - 76.0 % Final   •  Lymphocyte % 06/15/2022 27.1  19.6 - 45.3 % Final   • Monocyte % 06/15/2022 7.7  5.0 - 12.0 % Final   • Eosinophil % 06/15/2022 0.6  0.3 - 6.2 % Final   • Basophil % 06/15/2022 0.4  0.0 - 1.5 % Final   • Immature Grans % 06/15/2022 0.4  0.0 - 0.5 % Final   • Neutrophils, Absolute 06/15/2022 3.32  1.70 - 7.00 10*3/mm3 Final   • Lymphocytes, Absolute 06/15/2022 1.41  0.70 - 3.10 10*3/mm3 Final   • Monocytes, Absolute 06/15/2022 0.40  0.10 - 0.90 10*3/mm3 Final   • Eosinophils, Absolute 06/15/2022 0.03  0.00 - 0.40 10*3/mm3 Final   • Basophils, Absolute 06/15/2022 0.02  0.00 - 0.20 10*3/mm3 Final   • Immature Grans, Absolute 06/15/2022 0.02  0.00 - 0.05 10*3/mm3 Final   • nRBC 06/15/2022 0.2  0.0 - 0.2 /100 WBC Final   • Glucose 06/15/2022 98  65 - 99 mg/dL Final   • BUN 06/15/2022 13  8 - 23 mg/dL Final   • Creatinine 06/15/2022 1.07  0.76 - 1.27 mg/dL Final   • Sodium 06/15/2022 140  136 - 145 mmol/L Final   • Potassium 06/15/2022 4.0  3.5 - 5.2 mmol/L Final   • Chloride 06/15/2022 104  98 - 107 mmol/L Final   • CO2 06/15/2022 24.8  22.0 - 29.0 mmol/L Final   • Calcium 06/15/2022 9.3  8.6 - 10.5 mg/dL Final   • Total Protein 06/15/2022 6.9  6.0 - 8.5 g/dL Final   • Albumin 06/15/2022 4.60  3.50 - 5.20 g/dL Final   • ALT (SGPT) 06/15/2022 18  1 - 41 U/L Final   • AST (SGOT) 06/15/2022 24  1 - 40 U/L Final   • Alkaline Phosphatase 06/15/2022 54  39 - 117 U/L Final   • Total Bilirubin 06/15/2022 0.7  0.0 - 1.2 mg/dL Final   • Globulin 06/15/2022 2.3  gm/dL Final   • A/G Ratio 06/15/2022 2.0  g/dL Final   • BUN/Creatinine Ratio 06/15/2022 12.1  7.0 - 25.0 Final   • Anion Gap 06/15/2022 11.2  5.0 - 15.0 mmol/L Final   • eGFR 06/15/2022 76.5  >60.0 mL/min/1.73 Final    National Kidney Foundation and American Society of Nephrology (ASN) Task Force recommended calculation based on the Chronic Kidney Disease Epidemiology Collaboration (CKD-EPI) equation refit without adjustment for race.   • Hemoglobin A1C 06/15/2022 6.20 (H)  " 4.80 - 5.60 % Final   • Total Cholesterol 06/15/2022 121  0 - 200 mg/dL Final   • Triglycerides 06/15/2022 54  0 - 150 mg/dL Final   • HDL Cholesterol 06/15/2022 57  40 - 60 mg/dL Final   • LDL Cholesterol  06/15/2022 52  0 - 100 mg/dL Final   • VLDL Cholesterol 06/15/2022 12  5 - 40 mg/dL Final   • LDL/HDL Ratio 06/15/2022 0.93   Final   • 25 Hydroxy, Vitamin D 06/15/2022 51.2  30.0 - 100.0 ng/ml Final   • Vitamin B-12 06/15/2022 544  211 - 946 pg/mL Final      XR Shoulder 2+ View Left  Narrative: Left shoulder three views Neeta 15, 2022    INDICATION: Pain    FINDINGS:  Bones normally mineralized.  Status post distal clavicular resection.  No fracture or dislocation.  No focal bony lesions.  No significant soft tissue abnormality.  Impression: Postoperative distal left clavicle.  No acute bony abnormality.    Electronically signed by:  Sancho Hull MD  6/17/2022 7:25 AM  CDT Workstation: [id number]  XR Scapula Left  Narrative: Left scapula two views Neeta 15, 2022    INDICATION: Pain  Impression: FINDINGS and impression:  No focal or acute bony abnormality.    Electronically signed by:  Sancho Hull MD  6/17/2022 7:25 AM  CDT Workstation: [id number]    [unfilled]  Immunization History   Administered Date(s) Administered   • COVID-19 (MODERNA) 1st, 2nd, 3rd Dose Only 01/06/2021, 02/03/2021   • FluLaval/Fluzone >6mos 09/15/2020   • Fluzone High-Dose 65+yrs 09/29/2021   • Pneumococcal Polysaccharide (PPSV23) 10/28/2021       The following portions of the patient's history were reviewed and updated as appropriate: allergies, current medications, past family history, past medical history, past social history, past surgical history and problem list.        Physical Exam  /76 (BP Location: Left arm, Patient Position: Sitting, Cuff Size: Adult)   Pulse 100   Temp 98.3 °F (36.8 °C)   Ht 177.8 cm (70\")   Wt 84.3 kg (185 lb 12.8 oz)   SpO2 99%   BMI 26.66 kg/m²     Physical Exam  Vitals and nursing note " reviewed.   Constitutional:       Appearance: He is well-developed. He is not diaphoretic.   HENT:      Head: Normocephalic and atraumatic.      Right Ear: External ear normal.   Eyes:      Conjunctiva/sclera: Conjunctivae normal.      Pupils: Pupils are equal, round, and reactive to light.   Cardiovascular:      Rate and Rhythm: Normal rate. Rhythm irregular.      Heart sounds: Normal heart sounds. No murmur heard.  Pulmonary:      Effort: Pulmonary effort is normal. No respiratory distress.      Breath sounds: Normal breath sounds.   Abdominal:      General: Bowel sounds are normal. There is no distension.      Palpations: Abdomen is soft.      Tenderness: There is no abdominal tenderness.   Musculoskeletal:         General: Tenderness present. No deformity. Normal range of motion.      Cervical back: Normal range of motion and neck supple.   Skin:     General: Skin is warm.      Coloration: Skin is not pale.      Findings: No erythema or rash.   Neurological:      Mental Status: He is alert and oriented to person, place, and time.      Cranial Nerves: No cranial nerve deficit.   Psychiatric:         Behavior: Behavior normal.         [unfilled]   Diagnosis Plan   1. Essential hypertension        2. Mixed hyperlipidemia        3. Vitamin D deficiency        4. Chronic midline low back pain, unspecified whether sciatica present        5. Longstanding persistent atrial fibrillation (HCC)        6. Prediabetes        7. DDD (degenerative disc disease), lumbar        8. Spinal stenosis of lumbar region, unspecified whether neurogenic claudication present                 -recommend labwork n   -recommend influenza vaccination -given tdoay   -Left shoulder pain/scapula pain -reviewed MRI of left shoulder Orthopedic following   -lower back pain/spondylosis of lumbar spine - reviewed MRI of lumbar spine Pt failed PT/OT. Will increase cymbalta from daily to BID Neurosurgery following on cymbalta 30 mg daily on  neurontin 100 mg PO PRN   -overweight -BMI at 26.66    -prediabetes - on metformin  mg daily   -CKD stage 2 -Nephrology following   -family history prostate cancer/difficulty urinating/BPH - recent PSA normal .recent US of prostate shows enlarged prostate . on flomax 0.4 m PO qhs. Will refer to Urology   -SVT/atrial fibrillation    on toprol XL XR 50 mg daily. On eliquis.   Cardiology following. Recent stress test was low risk study. HR elevated stop toprol XL 50 mg daily and switch to dilacor  mg daily. Drug information provided. Pt advised to make an appt with Dr. Quick   -HLP - stop simvastatin  80 mg PO qhs recommend heart healthy diet start on lipitor 20 mg PO qhs   -HTN  - on micadis 40-12.5 mg PO q daily,  on toprol XL 50 mg daily.   -GERD -on pepcid 20 mg PO BID   -vitamin D defiicency -vitamin D once a week   -advised pt to be safe and call with questions and concerns  -advised pt to go to ER or call 911 if symptoms worrisome or severe  -advised pt to followup with specialist and referrals  -advised pt to be safe during COVID-19 pandemic  I spent 30  minutes caring for Sergio on this date of service. This time includes time spent by me in the following activities: preparing for the visit, reviewing tests, obtaining and/or reviewing a separately obtained history, performing a medically appropriate examination and/or evaluation, counseling and educating the patient/family/caregiver, ordering medications, tests, or procedures, referring and communicating with other health care professionals, documenting information in the medical record, independently interpreting results and communicating that information with the patient/family/caregiver and care coordination        This document has been electronically signed by Oneil Jorgensen MD on October 19, 2022 09:39 CDT

## 2022-10-19 ENCOUNTER — OFFICE VISIT (OUTPATIENT)
Dept: FAMILY MEDICINE CLINIC | Facility: CLINIC | Age: 67
End: 2022-10-19

## 2022-10-19 VITALS
SYSTOLIC BLOOD PRESSURE: 120 MMHG | TEMPERATURE: 98.3 F | WEIGHT: 185.8 LBS | HEART RATE: 100 BPM | BODY MASS INDEX: 26.6 KG/M2 | DIASTOLIC BLOOD PRESSURE: 76 MMHG | OXYGEN SATURATION: 99 % | HEIGHT: 70 IN

## 2022-10-19 DIAGNOSIS — R73.03 PREDIABETES: ICD-10-CM

## 2022-10-19 DIAGNOSIS — M54.50 CHRONIC MIDLINE LOW BACK PAIN, UNSPECIFIED WHETHER SCIATICA PRESENT: ICD-10-CM

## 2022-10-19 DIAGNOSIS — E78.2 MIXED HYPERLIPIDEMIA: ICD-10-CM

## 2022-10-19 DIAGNOSIS — I48.11 LONGSTANDING PERSISTENT ATRIAL FIBRILLATION: ICD-10-CM

## 2022-10-19 DIAGNOSIS — G89.29 CHRONIC MIDLINE LOW BACK PAIN, UNSPECIFIED WHETHER SCIATICA PRESENT: ICD-10-CM

## 2022-10-19 DIAGNOSIS — K21.9 GASTROESOPHAGEAL REFLUX DISEASE, UNSPECIFIED WHETHER ESOPHAGITIS PRESENT: ICD-10-CM

## 2022-10-19 DIAGNOSIS — E55.9 VITAMIN D DEFICIENCY: ICD-10-CM

## 2022-10-19 DIAGNOSIS — M48.061 SPINAL STENOSIS OF LUMBAR REGION, UNSPECIFIED WHETHER NEUROGENIC CLAUDICATION PRESENT: ICD-10-CM

## 2022-10-19 DIAGNOSIS — N40.1 BENIGN PROSTATIC HYPERPLASIA WITH LOWER URINARY TRACT SYMPTOMS, SYMPTOM DETAILS UNSPECIFIED: ICD-10-CM

## 2022-10-19 DIAGNOSIS — I10 ESSENTIAL HYPERTENSION: Primary | ICD-10-CM

## 2022-10-19 DIAGNOSIS — M51.36 DDD (DEGENERATIVE DISC DISEASE), LUMBAR: ICD-10-CM

## 2022-10-19 PROCEDURE — 99214 OFFICE O/P EST MOD 30 MIN: CPT | Performed by: FAMILY MEDICINE

## 2022-10-19 PROCEDURE — 90662 IIV NO PRSV INCREASED AG IM: CPT | Performed by: FAMILY MEDICINE

## 2022-10-19 PROCEDURE — G0008 ADMIN INFLUENZA VIRUS VAC: HCPCS | Performed by: FAMILY MEDICINE

## 2022-10-19 RX ORDER — TELMISARTAN 40 MG/1
40 TABLET ORAL DAILY
COMMUNITY
End: 2022-11-03

## 2022-10-19 RX ORDER — TAMSULOSIN HYDROCHLORIDE 0.4 MG/1
1 CAPSULE ORAL DAILY
Qty: 90 CAPSULE | Refills: 1 | Status: SHIPPED | OUTPATIENT
Start: 2022-10-19 | End: 2023-03-19 | Stop reason: SDUPTHER

## 2022-10-19 RX ORDER — METFORMIN HYDROCHLORIDE 500 MG/1
500 TABLET, EXTENDED RELEASE ORAL
Qty: 30 TABLET | Refills: 3 | Status: SHIPPED | OUTPATIENT
Start: 2022-10-19 | End: 2023-01-18 | Stop reason: SDUPTHER

## 2022-10-19 RX ORDER — ATORVASTATIN CALCIUM 20 MG/1
20 TABLET, FILM COATED ORAL DAILY
Qty: 30 TABLET | Refills: 1 | Status: SHIPPED | OUTPATIENT
Start: 2022-10-19 | End: 2022-12-12 | Stop reason: SDUPTHER

## 2022-10-19 RX ORDER — FAMOTIDINE 20 MG/1
20 TABLET, FILM COATED ORAL 2 TIMES DAILY
Qty: 180 TABLET | Refills: 0 | Status: SHIPPED | OUTPATIENT
Start: 2022-10-19 | End: 2022-12-20 | Stop reason: SDUPTHER

## 2022-10-19 RX ORDER — DILTIAZEM HYDROCHLORIDE 120 MG/1
120 CAPSULE, EXTENDED RELEASE ORAL DAILY
Qty: 30 CAPSULE | Refills: 3 | Status: SHIPPED | OUTPATIENT
Start: 2022-10-19 | End: 2023-01-18 | Stop reason: SDUPTHER

## 2022-10-19 NOTE — PATIENT INSTRUCTIONS
Stop metoprolol  50 mg daily    Switch to dilacor 20 mg daily    Monitor Heart rate at home goal less than 90     Make an appt with Cardiology Dr. Quick soon     Recheck in 2 weeks  Bring heart rate and blood perssure readings next vsiti    Stop simvastatin and start on lipitor or atorvastatin  for cholesterol

## 2022-10-19 NOTE — PROGRESS NOTES
Injection  Injection performed in Right Deltoid by Catalina Ni MA. Patient tolerated the procedure well without complications.  10/19/22   Catalina Ni MA

## 2022-10-20 ENCOUNTER — TELEPHONE (OUTPATIENT)
Dept: FAMILY MEDICINE CLINIC | Facility: CLINIC | Age: 67
End: 2022-10-20

## 2022-11-01 ENCOUNTER — LAB (OUTPATIENT)
Dept: LAB | Facility: HOSPITAL | Age: 67
End: 2022-11-01

## 2022-11-01 DIAGNOSIS — I48.91 ATRIAL FIBRILLATION, CONTROLLED: ICD-10-CM

## 2022-11-01 DIAGNOSIS — K21.9 GASTROESOPHAGEAL REFLUX DISEASE, UNSPECIFIED WHETHER ESOPHAGITIS PRESENT: ICD-10-CM

## 2022-11-01 DIAGNOSIS — E78.2 MIXED HYPERLIPIDEMIA: ICD-10-CM

## 2022-11-01 DIAGNOSIS — G89.29 CHRONIC BILATERAL LOW BACK PAIN WITH BILATERAL SCIATICA: Chronic | ICD-10-CM

## 2022-11-01 DIAGNOSIS — E55.9 VITAMIN D DEFICIENCY: ICD-10-CM

## 2022-11-01 DIAGNOSIS — M54.42 CHRONIC BILATERAL LOW BACK PAIN WITH BILATERAL SCIATICA: Chronic | ICD-10-CM

## 2022-11-01 DIAGNOSIS — M25.512 LEFT SHOULDER PAIN, UNSPECIFIED CHRONICITY: ICD-10-CM

## 2022-11-01 DIAGNOSIS — R39.198 DIFFICULTY URINATING: ICD-10-CM

## 2022-11-01 DIAGNOSIS — M48.061 SPINAL STENOSIS OF LUMBAR REGION, UNSPECIFIED WHETHER NEUROGENIC CLAUDICATION PRESENT: ICD-10-CM

## 2022-11-01 DIAGNOSIS — N40.1 BENIGN PROSTATIC HYPERPLASIA WITH LOWER URINARY TRACT SYMPTOMS, SYMPTOM DETAILS UNSPECIFIED: ICD-10-CM

## 2022-11-01 DIAGNOSIS — M89.8X1 PAIN OF LEFT SCAPULA: ICD-10-CM

## 2022-11-01 DIAGNOSIS — R79.9 ABNORMAL FINDING OF BLOOD CHEMISTRY, UNSPECIFIED: ICD-10-CM

## 2022-11-01 DIAGNOSIS — M54.41 CHRONIC BILATERAL LOW BACK PAIN WITH BILATERAL SCIATICA: Chronic | ICD-10-CM

## 2022-11-01 DIAGNOSIS — G89.29 CHRONIC NECK PAIN: Chronic | ICD-10-CM

## 2022-11-01 DIAGNOSIS — M54.2 CHRONIC NECK PAIN: Chronic | ICD-10-CM

## 2022-11-01 DIAGNOSIS — M77.8 LEFT SHOULDER TENDONITIS: ICD-10-CM

## 2022-11-01 DIAGNOSIS — M51.36 DDD (DEGENERATIVE DISC DISEASE), LUMBAR: ICD-10-CM

## 2022-11-01 DIAGNOSIS — I10 ESSENTIAL HYPERTENSION: ICD-10-CM

## 2022-11-01 DIAGNOSIS — M25.50 MULTIPLE JOINT PAIN: ICD-10-CM

## 2022-11-01 DIAGNOSIS — G89.29 CHRONIC MIDLINE LOW BACK PAIN, UNSPECIFIED WHETHER SCIATICA PRESENT: ICD-10-CM

## 2022-11-01 DIAGNOSIS — M54.50 CHRONIC MIDLINE LOW BACK PAIN, UNSPECIFIED WHETHER SCIATICA PRESENT: ICD-10-CM

## 2022-11-01 LAB
25(OH)D3 SERPL-MCNC: 51.5 NG/ML (ref 30–100)
ALBUMIN SERPL-MCNC: 4.5 G/DL (ref 3.5–5.2)
ALBUMIN/GLOB SERPL: 2.1 G/DL
ALP SERPL-CCNC: 67 U/L (ref 39–117)
ALT SERPL W P-5'-P-CCNC: 20 U/L (ref 1–41)
ANION GAP SERPL CALCULATED.3IONS-SCNC: 10.9 MMOL/L (ref 5–15)
AST SERPL-CCNC: 28 U/L (ref 1–40)
BASOPHILS # BLD AUTO: 0.02 10*3/MM3 (ref 0–0.2)
BASOPHILS NFR BLD AUTO: 0.3 % (ref 0–1.5)
BILIRUB SERPL-MCNC: 0.5 MG/DL (ref 0–1.2)
BUN SERPL-MCNC: 10 MG/DL (ref 8–23)
BUN/CREAT SERPL: 7.8 (ref 7–25)
CALCIUM SPEC-SCNC: 9.8 MG/DL (ref 8.6–10.5)
CHLORIDE SERPL-SCNC: 106 MMOL/L (ref 98–107)
CHOLEST SERPL-MCNC: 131 MG/DL (ref 0–200)
CO2 SERPL-SCNC: 25.1 MMOL/L (ref 22–29)
CREAT SERPL-MCNC: 1.28 MG/DL (ref 0.76–1.27)
DEPRECATED RDW RBC AUTO: 40.2 FL (ref 37–54)
EGFRCR SERPLBLD CKD-EPI 2021: 61.7 ML/MIN/1.73
EOSINOPHIL # BLD AUTO: 0.05 10*3/MM3 (ref 0–0.4)
EOSINOPHIL NFR BLD AUTO: 0.8 % (ref 0.3–6.2)
ERYTHROCYTE [DISTWIDTH] IN BLOOD BY AUTOMATED COUNT: 12 % (ref 12.3–15.4)
GLOBULIN UR ELPH-MCNC: 2.1 GM/DL
GLUCOSE SERPL-MCNC: 106 MG/DL (ref 65–99)
HBA1C MFR BLD: 6 % (ref 4.8–5.6)
HCT VFR BLD AUTO: 39.1 % (ref 37.5–51)
HDLC SERPL-MCNC: 67 MG/DL (ref 40–60)
HGB BLD-MCNC: 13.3 G/DL (ref 13–17.7)
IMM GRANULOCYTES # BLD AUTO: 0.02 10*3/MM3 (ref 0–0.05)
IMM GRANULOCYTES NFR BLD AUTO: 0.3 % (ref 0–0.5)
LDLC SERPL CALC-MCNC: 54 MG/DL (ref 0–100)
LDLC/HDLC SERPL: 0.83 {RATIO}
LYMPHOCYTES # BLD AUTO: 1.44 10*3/MM3 (ref 0.7–3.1)
LYMPHOCYTES NFR BLD AUTO: 24 % (ref 19.6–45.3)
MCH RBC QN AUTO: 30.9 PG (ref 26.6–33)
MCHC RBC AUTO-ENTMCNC: 34 G/DL (ref 31.5–35.7)
MCV RBC AUTO: 90.9 FL (ref 79–97)
MONOCYTES # BLD AUTO: 0.5 10*3/MM3 (ref 0.1–0.9)
MONOCYTES NFR BLD AUTO: 8.3 % (ref 5–12)
NEUTROPHILS NFR BLD AUTO: 3.96 10*3/MM3 (ref 1.7–7)
NEUTROPHILS NFR BLD AUTO: 66.3 % (ref 42.7–76)
NRBC BLD AUTO-RTO: 0 /100 WBC (ref 0–0.2)
PLATELET # BLD AUTO: 204 10*3/MM3 (ref 140–450)
PMV BLD AUTO: 10.3 FL (ref 6–12)
POTASSIUM SERPL-SCNC: 4.2 MMOL/L (ref 3.5–5.2)
PROT SERPL-MCNC: 6.6 G/DL (ref 6–8.5)
RBC # BLD AUTO: 4.3 10*6/MM3 (ref 4.14–5.8)
SODIUM SERPL-SCNC: 142 MMOL/L (ref 136–145)
TRIGL SERPL-MCNC: 41 MG/DL (ref 0–150)
VIT B12 BLD-MCNC: 565 PG/ML (ref 211–946)
VLDLC SERPL-MCNC: 10 MG/DL (ref 5–40)
WBC NRBC COR # BLD: 5.99 10*3/MM3 (ref 3.4–10.8)

## 2022-11-01 PROCEDURE — 80061 LIPID PANEL: CPT

## 2022-11-01 PROCEDURE — 82306 VITAMIN D 25 HYDROXY: CPT

## 2022-11-01 PROCEDURE — 80053 COMPREHEN METABOLIC PANEL: CPT

## 2022-11-01 PROCEDURE — 82607 VITAMIN B-12: CPT

## 2022-11-01 PROCEDURE — 83036 HEMOGLOBIN GLYCOSYLATED A1C: CPT

## 2022-11-01 PROCEDURE — 85025 COMPLETE CBC W/AUTO DIFF WBC: CPT

## 2022-11-03 ENCOUNTER — OFFICE VISIT (OUTPATIENT)
Dept: FAMILY MEDICINE CLINIC | Facility: CLINIC | Age: 67
End: 2022-11-03

## 2022-11-03 ENCOUNTER — TELEPHONE (OUTPATIENT)
Dept: FAMILY MEDICINE CLINIC | Facility: CLINIC | Age: 67
End: 2022-11-03

## 2022-11-03 VITALS
DIASTOLIC BLOOD PRESSURE: 62 MMHG | TEMPERATURE: 97.9 F | HEIGHT: 70 IN | SYSTOLIC BLOOD PRESSURE: 98 MMHG | OXYGEN SATURATION: 97 % | HEART RATE: 100 BPM | BODY MASS INDEX: 26.71 KG/M2 | WEIGHT: 186.6 LBS

## 2022-11-03 DIAGNOSIS — I10 ESSENTIAL HYPERTENSION: ICD-10-CM

## 2022-11-03 DIAGNOSIS — R73.03 PREDIABETES: ICD-10-CM

## 2022-11-03 DIAGNOSIS — E78.2 MIXED HYPERLIPIDEMIA: ICD-10-CM

## 2022-11-03 DIAGNOSIS — E55.9 VITAMIN D DEFICIENCY: Primary | ICD-10-CM

## 2022-11-03 DIAGNOSIS — I48.11 LONGSTANDING PERSISTENT ATRIAL FIBRILLATION: ICD-10-CM

## 2022-11-03 PROCEDURE — 99214 OFFICE O/P EST MOD 30 MIN: CPT | Performed by: FAMILY MEDICINE

## 2022-11-03 RX ORDER — TELMISARTAN 20 MG/1
20 TABLET ORAL DAILY
Qty: 30 TABLET | Refills: 3 | Status: SHIPPED | OUTPATIENT
Start: 2022-11-03

## 2022-11-03 NOTE — PATIENT INSTRUCTIONS
Cut back on micardis from 40 to 20 mg daily. Continue on dilacor 120 mg daily    Monitor blood pressure and heart rate at home    Recheck in 6 weeks

## 2022-11-10 ENCOUNTER — OFFICE VISIT (OUTPATIENT)
Dept: FAMILY MEDICINE CLINIC | Facility: CLINIC | Age: 67
End: 2022-11-10

## 2022-11-10 VITALS
OXYGEN SATURATION: 99 % | HEART RATE: 90 BPM | BODY MASS INDEX: 26.66 KG/M2 | WEIGHT: 186.2 LBS | SYSTOLIC BLOOD PRESSURE: 116 MMHG | DIASTOLIC BLOOD PRESSURE: 82 MMHG | HEIGHT: 70 IN | TEMPERATURE: 97.4 F

## 2022-11-10 DIAGNOSIS — Z00.00 MEDICARE ANNUAL WELLNESS VISIT, INITIAL: Primary | ICD-10-CM

## 2022-11-10 PROCEDURE — 1159F MED LIST DOCD IN RCRD: CPT | Performed by: FAMILY MEDICINE

## 2022-11-10 PROCEDURE — 1170F FXNL STATUS ASSESSED: CPT | Performed by: FAMILY MEDICINE

## 2022-11-10 PROCEDURE — G0438 PPPS, INITIAL VISIT: HCPCS | Performed by: FAMILY MEDICINE

## 2022-11-10 RX ORDER — DOCUSATE SODIUM 100 MG/1
100 CAPSULE, LIQUID FILLED ORAL 2 TIMES DAILY PRN
Qty: 60 CAPSULE | Refills: 3 | Status: SHIPPED | OUTPATIENT
Start: 2022-11-10 | End: 2022-11-15 | Stop reason: SDUPTHER

## 2022-11-10 NOTE — PROGRESS NOTES
The ABCs of the Annual Wellness Visit  Initial Medicare Wellness Visit    No chief complaint on file.    Subjective   History of Present Illness:  Sergio Sandoval is a 67 y.o. male who presents for an Initial Medicare Wellness Visit.    The following portions of the patient's history were reviewed and   updated as appropriate: allergies, current medications, past family history, past medical history, past social history, past surgical history and problem list.     Compared to one year ago, the patient feels his physical   health is worse.    Compared to one year ago, the patient feels his mental   health is the same.    Recent Hospitalizations:  He was not admitted to the hospital during the last year.       Current Medical Providers:  Patient Care Team:  Oneil Jorgensen MD as PCP - General (Family Medicine)    Outpatient Medications Prior to Visit   Medication Sig Dispense Refill   • Acetaminophen (TYLENOL 8 HOUR PO) Take 1,000 mg by mouth 3 (Three) Times a Day.     • apixaban (ELIQUIS) 5 MG tablet tablet Take 1 tablet by mouth Every 12 (Twelve) Hours. 180 tablet 3   • atorvastatin (Lipitor) 20 MG tablet Take 1 tablet by mouth Daily. 30 tablet 1   • dilTIAZem XR (DILACOR XR) 120 MG 24 hr capsule Take 1 capsule by mouth Daily. 30 capsule 3   • famotidine (Pepcid) 20 MG tablet Take 1 tablet by mouth 2 (Two) Times a Day. 180 tablet 0   • fluticasone (FLONASE) 50 MCG/ACT nasal spray 2 sprays into the nostril(s) as directed by provider Daily.     • Glucosamine-Chondroit-Vit C-Mn (GLUCOSAMINE 1500 COMPLEX PO) Take 1,500 mg by mouth 2 (two) times a day.     • metFORMIN ER (GLUCOPHAGE-XR) 500 MG 24 hr tablet Take 1 tablet by mouth Daily With Breakfast. 30 tablet 3   • Multiple Vitamins-Minerals (MULTIVITAMIN WITH MINERALS) tablet tablet Take 1 tablet by mouth Daily. GETS OTC     • Saw Palmetto 450 MG capsule Take  by mouth 2 (two) times a day. GETS OTC     • tamsulosin (Flomax) 0.4 MG capsule 24 hr capsule Take 1  "capsule by mouth Daily. 90 capsule 1   • telmisartan (Micardis) 20 MG tablet Take 1 tablet by mouth Daily. 30 tablet 3   • VITAMIN D, ERGOCALCIFEROL, PO Take  by mouth. GETS OTC       No facility-administered medications prior to visit.       No opioid medication identified on active medication list. I have reviewed chart for other potential  high risk medication/s and harmful drug interactions in the elderly.          Aspirin is not on active medication list.  Aspirin use is contraindicated for this patient due to: history of bleeding.  .    Patient Active Problem List   Diagnosis   • Mixed hyperlipidemia   • Essential hypertension   • Vitamin D deficiency   • Gastroesophageal reflux disease   • Benign prostatic hyperplasia with lower urinary tract symptoms   • Benign prostatic hyperplasia with weak urinary stream   • Prediabetes   • Dizziness   • Thrombocytopenia (HCC)   • Atrial fibrillation, controlled (HCC)   • Dyspnea on exertion   • History of thrombocytopenia   • History of prediabetes   • Longstanding persistent atrial fibrillation (HCC)   • Need for pneumococcal vaccine   • Multiple joint pain   • Chronic left shoulder pain   • Chronic midline low back pain   • Pain of left scapula   • Left shoulder tendonitis   • Spinal stenosis of lumbar region   • DDD (degenerative disc disease), lumbar     Advance Care Planning  Advance Directive is not on file.  ACP discussion was held with the patient during this visit. Patient does not have an advance directive, information provided.          Objective     There were no vitals filed for this visit.  Estimated body mass index is 26.77 kg/m² as calculated from the following:    Height as of 11/3/22: 177.8 cm (70\").    Weight as of 11/3/22: 84.6 kg (186 lb 9.6 oz).    BMI is >= 25 and <30. (Overweight) The following options were offered after discussion;: weight loss educational material (shared in after visit summary), exercise counseling/recommendations and nutrition " "counseling/recommendations      Does the patient have evidence of cognitive impairment? No    Physical Exam   /82 (BP Location: Left arm, Patient Position: Sitting, Cuff Size: Adult)   Pulse 90   Temp 97.4 °F (36.3 °C)   Ht 177.8 cm (70\")   Wt 84.5 kg (186 lb 3.2 oz)   SpO2 99%   BMI 26.72 kg/m²     Lab Results   Component Value Date    TRIG 41 11/01/2022    HDL 67 (H) 11/01/2022    LDL 54 11/01/2022    VLDL 10 11/01/2022    HGBA1C 6.00 (H) 11/01/2022          HEALTH RISK ASSESSMENT    Smoking Status:  Social History     Tobacco Use   Smoking Status Never   Smokeless Tobacco Never     Alcohol Consumption:  Social History     Substance and Sexual Activity   Alcohol Use Not Currently    Comment: none for 20 years     Fall Risk Screen:    GUILLAUME Fall Risk Assessment was completed, and patient is at LOW risk for falls.Assessment completed on:3/4/2022    Depression Screen:   PHQ-2/PHQ-9 Depression Screening 3/4/2022   Retired PHQ-9 Total Score 0   Retired Total Score 0       Health Habits and Functional and Cognitive Screening:  No flowsheet data found.    Age-appropriate Screening Schedule:  Refer to the list below for future screening recommendations based on patient's age, sex and/or medical conditions. Orders for these recommended tests are listed in the plan section. The patient has been provided with a written plan.    Health Maintenance   Topic Date Due   • ZOSTER VACCINE (1 of 2) 06/10/2023 (Originally 6/10/2006)   • TDAP/TD VACCINES (1 - Tdap) 09/15/2028 (Originally 6/10/1975)   • LIPID PANEL  11/01/2023   • INFLUENZA VACCINE  Completed            Assessment & Plan   CMS Preventative Services Quick Reference  Risk Factors Identified During Encounter  Alcohol Misuse  Cardiovascular Disease  Chronic Pain   Dementia/Memory   Depression/Dysphoria  Drug Use/Abuse Identified or Suspected  Fall Risk-High or Moderate  Glaucoma or Family History of Glaucoma  Hearing Problem  Inadequate Social Support, " Isolation, Loneliness, Lack of Transportation, Financial Difficulties, or Caregiver Stress   Inactivity/Sedentary  Obesity/Overweight   Polypharmacy  High Risk Sexual Behavior   Tobacco Use/Dependance (use dotphrase .tobaccocessation for documentation)  Urinary Incontinence  The above risks/problems have been discussed with the patient.  Follow up actions/plans if indicated are seen below in the Assessment/Plan Section.  Pertinent information has been shared with the patient in the After Visit Summary.    There are no diagnoses linked to this encounter.    Follow Up:  No follow-ups on file.     An After Visit Summary and PPPS were made available to the patient.               -Action plan discussed   -please see scanned documents  -repeat in 1 year        This document has been electronically signed by Oneil Jorgensen MD on November 10, 2022 16:49 CST

## 2022-11-10 NOTE — PATIENT INSTRUCTIONS
Medicare Wellness  Personal Prevention Plan of Service     Date of Office Visit:    Encounter Provider:  Oneil Jorgensen MD  Place of Service:  [facility]  Patient Name: Sergio Sandoval  :  1955    As part of the Medicare Wellness portion of your visit today, we are providing you with this personalized preventive plan of services (PPPS). This plan is based upon recommendations of the [place] Preventive Services Task Force (USPSTF) and the Advisory Committee on Immunization Practices (ACIP).    This lists the preventive care services that should be considered, and provides dates of when you are due. Items listed as completed are up-to-date and do not require any further intervention.    Health Maintenance   Topic Date Due    Pneumococcal Vaccine 65+ (2 - PCV) 2023 (Originally 10/28/2022)    COVID-19 Vaccine (3 - Booster for Moderna series) 2023 (Originally 3/31/2021)    ZOSTER VACCINE (1 of 2) 06/10/2023 (Originally 6/10/2006)    TDAP/TD VACCINES (1 - Tdap) 09/15/2028 (Originally 6/10/1975)    LIPID PANEL  2023    ANNUAL WELLNESS VISIT  11/10/2023    COLORECTAL CANCER SCREENING  2028    HEPATITIS C SCREENING  Completed    INFLUENZA VACCINE  Completed       No orders of the defined types were placed in this encounter.      Return in about 1 year (around 11/10/2023) for Medicare Wellness.

## 2022-11-15 RX ORDER — DOCUSATE SODIUM 100 MG/1
100 CAPSULE, LIQUID FILLED ORAL 2 TIMES DAILY PRN
Qty: 60 CAPSULE | Refills: 3 | Status: SHIPPED | OUTPATIENT
Start: 2022-11-15

## 2022-11-17 ENCOUNTER — TELEPHONE (OUTPATIENT)
Dept: CARDIOLOGY | Facility: CLINIC | Age: 67
End: 2022-11-17

## 2022-11-17 NOTE — TELEPHONE ENCOUNTER
Message -----   From: Lelo Richardson MA   Sent: 11/16/2022   2:19 PM CST   To: Rojas Higuera MD   Subject: FW: Appointment Request                            Dr. Jorgensen wants you to see him due to his heart rate (150 resting)? Please advise   Spoke with pt he is schedule to come in on 12/1 at 9 am in [place]       ----- Message -----   From: Shira Lizarraga RegSched Rep   Sent: 11/16/2022   1:58 PM CST   To: Lelo Richardson MA   Subject: FW: Appointment Request                            Please advise. Thanks.    ----- Message -----   From: Sergio Sandoval   Sent: 10/24/2022  10:15 AM CST   To: [facility]   Subject: Appointment Request                                Appointment Request From: Sergio Sandoval      With Provider: Rojas Higuera MD [[facility] CARDIOLOGY]      Preferred Date Range: Any      Preferred Times: Any Time      Reason for visit: Follow-up      Comments:   last week, Dr Jorgensen told me to have a visit with you before the already scheduled visit in march. he changed my meds because he was concerned about my heart rate (150 resting)   if the appointment can be in [place] that would be prefered.

## 2022-11-23 DIAGNOSIS — M25.511 ACUTE PAIN OF RIGHT SHOULDER: Primary | ICD-10-CM

## 2022-11-29 ENCOUNTER — OFFICE VISIT (OUTPATIENT)
Dept: ORTHOPEDIC SURGERY | Facility: CLINIC | Age: 67
End: 2022-11-29

## 2022-11-29 VITALS — HEIGHT: 70 IN | BODY MASS INDEX: 26.84 KG/M2 | WEIGHT: 187.5 LBS

## 2022-11-29 DIAGNOSIS — M19.019 GLENOHUMERAL ARTHRITIS: ICD-10-CM

## 2022-11-29 DIAGNOSIS — G89.29 CHRONIC RIGHT SHOULDER PAIN: Primary | ICD-10-CM

## 2022-11-29 DIAGNOSIS — M25.511 CHRONIC RIGHT SHOULDER PAIN: Primary | ICD-10-CM

## 2022-11-29 DIAGNOSIS — M19.011 ARTHRITIS OF RIGHT ACROMIOCLAVICULAR JOINT: ICD-10-CM

## 2022-11-29 DIAGNOSIS — M24.811 INTERNAL DERANGEMENT OF RIGHT SHOULDER: ICD-10-CM

## 2022-11-29 PROCEDURE — 99214 OFFICE O/P EST MOD 30 MIN: CPT | Performed by: NURSE PRACTITIONER

## 2022-11-29 PROCEDURE — 20610 DRAIN/INJ JOINT/BURSA W/O US: CPT | Performed by: NURSE PRACTITIONER

## 2022-11-29 RX ORDER — TRIAMCINOLONE ACETONIDE 40 MG/ML
40 INJECTION, SUSPENSION INTRA-ARTICULAR; INTRAMUSCULAR
Status: COMPLETED | OUTPATIENT
Start: 2022-11-29 | End: 2022-11-29

## 2022-11-29 RX ORDER — LIDOCAINE HYDROCHLORIDE 10 MG/ML
2 INJECTION, SOLUTION INFILTRATION; PERINEURAL
Status: COMPLETED | OUTPATIENT
Start: 2022-11-29 | End: 2022-11-29

## 2022-11-29 RX ADMIN — TRIAMCINOLONE ACETONIDE 40 MG: 40 INJECTION, SUSPENSION INTRA-ARTICULAR; INTRAMUSCULAR at 08:34

## 2022-11-29 RX ADMIN — LIDOCAINE HYDROCHLORIDE 2 ML: 10 INJECTION, SOLUTION INFILTRATION; PERINEURAL at 08:34

## 2022-11-29 NOTE — PROGRESS NOTES
Sergio Sandoval is a 67 y.o. male   Primary provider:  Oneil Jorgensen MD       Chief Complaint   Patient presents with   • Right Shoulder - Initial Evaluation, Pain       HISTORY OF PRESENT ILLNESS:    Mr. Sandoval is a 67 year old male who presents today with complaints of right shoulder pain.  Shoulder pain has been present for approximately 6 months.  Denies injury.  He reports rotator cuff repair around 7 years ago.  He describes his pain as moderate and intermittent.  Pain is associated with popping.  He has tried rice therapy.  No numbness or tingling.  He has pain with overhead activity and when lying on right side.  He reports pain wakes him up at night.  He is sent for x-rays.      Arm Pain   Incident onset: 6 months  There was no injury mechanism. The pain is present in the right shoulder. The quality of the pain is described as stabbing. The pain radiates to the right arm. The pain is at a severity of 5/10. The pain is moderate. The pain has been intermittent since the incident. Associated symptoms comments: Clicking, popping, snapping . The symptoms are aggravated by movement and lifting. He has tried ice for the symptoms.        CONCURRENT MEDICAL HISTORY:    Past Medical History:   Diagnosis Date   • Atrial fibrillation (HCC)    • DDD (degenerative disc disease), thoracic    • Hyperlipidemia    • Hypertension    • Lipoma of skin        No Known Allergies      Current Outpatient Medications:   •  Acetaminophen (TYLENOL 8 HOUR PO), Take 1,000 mg by mouth 3 (Three) Times a Day., Disp: , Rfl:   •  apixaban (ELIQUIS) 5 MG tablet tablet, Take 1 tablet by mouth Every 12 (Twelve) Hours., Disp: 180 tablet, Rfl: 3  •  atorvastatin (Lipitor) 20 MG tablet, Take 1 tablet by mouth Daily., Disp: 30 tablet, Rfl: 1  •  dilTIAZem XR (DILACOR XR) 120 MG 24 hr capsule, Take 1 capsule by mouth Daily., Disp: 30 capsule, Rfl: 3  •  docusate sodium (Colace) 100 MG capsule, Take 1 capsule by mouth 2 (Two) Times a Day As  Needed for Constipation., Disp: 60 capsule, Rfl: 3  •  famotidine (Pepcid) 20 MG tablet, Take 1 tablet by mouth 2 (Two) Times a Day., Disp: 180 tablet, Rfl: 0  •  fluticasone (FLONASE) 50 MCG/ACT nasal spray, 2 sprays into the nostril(s) as directed by provider Daily., Disp: , Rfl:   •  Glucosamine-Chondroit-Vit C-Mn (GLUCOSAMINE 1500 COMPLEX PO), Take 1,500 mg by mouth 2 (two) times a day., Disp: , Rfl:   •  metFORMIN ER (GLUCOPHAGE-XR) 500 MG 24 hr tablet, Take 1 tablet by mouth Daily With Breakfast., Disp: 30 tablet, Rfl: 3  •  Multiple Vitamins-Minerals (MULTIVITAMIN WITH MINERALS) tablet tablet, Take 1 tablet by mouth Daily. GETS OTC, Disp: , Rfl:   •  Saw Palmetto 450 MG capsule, Take  by mouth 2 (two) times a day. GETS OTC, Disp: , Rfl:   •  tamsulosin (Flomax) 0.4 MG capsule 24 hr capsule, Take 1 capsule by mouth Daily., Disp: 90 capsule, Rfl: 1  •  telmisartan (Micardis) 20 MG tablet, Take 1 tablet by mouth Daily., Disp: 30 tablet, Rfl: 3  •  VITAMIN D, ERGOCALCIFEROL, PO, Take  by mouth. GETS OTC, Disp: , Rfl:     Past Surgical History:   Procedure Laterality Date   • CARDIAC ABLATION     • COLONOSCOPY     • SHOULDER SURGERY     • UPPER GASTROINTESTINAL ENDOSCOPY      with esophageal dilation   • WISDOM TOOTH EXTRACTION         Family History   Problem Relation Age of Onset   • Arthritis Mother    • Heart disease Mother    • Rheumatic fever Mother    • Hyperlipidemia Father    • Hypertension Father    • Diabetes Father    • Stroke Father    • Prostate cancer Father    • Skin cancer Father    • Cancer Sister    • Alcohol abuse Brother    • No Known Problems Daughter    • No Known Problems Son    • Prostate cancer Paternal Grandfather    • No Known Problems Sister         Social History     Socioeconomic History   • Marital status:    Tobacco Use   • Smoking status: Never   • Smokeless tobacco: Never   Vaping Use   • Vaping Use: Never used   Substance and Sexual Activity   • Alcohol use: Not Currently  "    Comment: none for 20 years   • Drug use: Never   • Sexual activity: Defer        Review of Systems    Left shoulder pain.     PHYSICAL EXAMINATION:       Ht 177.8 cm (70\")   Wt 85 kg (187 lb 8 oz)   BMI 26.90 kg/m²     Physical Exam  Vitals and nursing note reviewed.   Constitutional:       General: He is not in acute distress.     Appearance: He is well-developed. He is not toxic-appearing.   HENT:      Head: Normocephalic.   Pulmonary:      Effort: Pulmonary effort is normal. No respiratory distress.   Skin:     General: Skin is warm and dry.   Neurological:      Mental Status: He is alert and oriented to person, place, and time.   Psychiatric:         Behavior: Behavior normal.         Thought Content: Thought content normal.         Judgment: Judgment normal.         GAIT:     [x]  Normal  []  Antalgic    Assistive device: [x]  None  []  Walker     []  Crutches  []  Cane     []  Wheelchair  []  Stretcher    Right Shoulder Exam     Tenderness   The patient is experiencing tenderness in the acromion.    Range of Motion   Active abduction: 110   Extension: normal   External rotation: 80   Forward flexion: normal   Internal rotation 90 degrees: normal     Tests   Impingement: negative    Other   Erythema: absent  Sensation: normal  Pulse: present    Comments:  Negative full can test.  Positive empty can test                      ASSESSMENT:    Diagnoses and all orders for this visit:    Chronic right shoulder pain    Arthritis of right acromioclavicular joint    Glenohumeral arthritis    Internal derangement of right shoulder          PLAN  Large Joint Arthrocentesis: R subacromial bursa  Date/Time: 11/29/2022 8:34 AM  Consent given by: patient  Site marked: site marked  Timeout: Immediately prior to procedure a time out was called to verify the correct patient, procedure, equipment, support staff and site/side marked as required   Supporting Documentation  Indications: pain   Procedure Details  Location: " shoulder - R subacromial bursa  Preparation: Patient was prepped and draped in the usual sterile fashion  Needle size: 22 G  Approach: posterior  Medications administered: 40 mg triamcinolone acetonide 40 MG/ML; 2 mL lidocaine 1 %  Patient tolerance: patient tolerated the procedure well with no immediate complications          X-rays reviewed, no acute bony abnormality identified.  Patient reports good relief with last subacromial injection given to his left shoulder.  After discussing risk, benefits, alternatives, patient desires to proceed with subacromial injection for right shoulder.  Patient is not a candidate for NSAIDs because he takes Eliquis.  Patient tolerated injection well.  Patient to continue RICE therapy, activity modification, gentle stretching as needed.  Signs symptoms to report and when to seek care explained, patient verbalized understanding.  Patient to return in 4 to 6 weeks if not feeling better, at this point time we may order MRI indicated.    Return in about 4 weeks (around 12/27/2022), or if symptoms worsen or fail to improve.    EMR Dragon/Transciption Disclaimer: Some of this note may be an electronic transcription/translation of spoken language to printed text.  The electronic translation of spoken language may permit erroneous, or at times, nonsensical words or phrases to be inadvertently transcribed. Although I have reviewed the note for such errors, some may still exist.       This document has been electronically signed by Jayne KHNA on November 29, 2022 08:29 CST    Answers for HPI/ROS submitted by the patient on 11/22/2022  What is the primary reason for your visit?: Other  Please describe your symptoms.: Pain in right front sholder  Have you had these symptoms before?: Yes  How long have you been having these symptoms?: Greater than 2 weeks  Please list any medications you are currently taking for this condition.: Acetaminophen  Please describe any probable cause for these  symptoms. : Burcitus?

## 2022-12-01 ENCOUNTER — OFFICE VISIT (OUTPATIENT)
Dept: CARDIOLOGY | Facility: CLINIC | Age: 67
End: 2022-12-01

## 2022-12-01 VITALS
OXYGEN SATURATION: 98 % | SYSTOLIC BLOOD PRESSURE: 180 MMHG | WEIGHT: 189.2 LBS | DIASTOLIC BLOOD PRESSURE: 69 MMHG | BODY MASS INDEX: 27.09 KG/M2 | HEIGHT: 70 IN | HEART RATE: 78 BPM

## 2022-12-01 DIAGNOSIS — I10 ESSENTIAL HYPERTENSION: ICD-10-CM

## 2022-12-01 DIAGNOSIS — I48.19 ATRIAL FIBRILLATION, PERSISTENT: Primary | ICD-10-CM

## 2022-12-01 DIAGNOSIS — E78.2 MIXED HYPERLIPIDEMIA: ICD-10-CM

## 2022-12-01 DIAGNOSIS — Z01.810 PREOPERATIVE CARDIOVASCULAR EXAMINATION: ICD-10-CM

## 2022-12-01 PROCEDURE — 99214 OFFICE O/P EST MOD 30 MIN: CPT | Performed by: INTERNAL MEDICINE

## 2022-12-01 PROCEDURE — 93000 ELECTROCARDIOGRAM COMPLETE: CPT | Performed by: INTERNAL MEDICINE

## 2022-12-01 NOTE — PROGRESS NOTES
Sergio Sandoval  67 y.o. male      1. Atrial fibrillation, persistent (HCC)    2. Essential hypertension    3. Mixed hyperlipidemia    4. Preoperative cardiovascular examination        History of Present Illness  Sergio Sandoval is a 67-year-old male who was seen by me for the first time back in September 2021.  He has a long and complex cardiac history.  It appears that he was diagnosed to have supraventricular tachycardia when he was stationed in [place] in the late 1990s.  He was treated with beta-blockers and later in 1998 while in [place] appears to have undergone ablation for either SVT or atrial fibrillation.  Subsequently he was noted to have atrial fibrillation and underwent cryoablation in 2012.  An MRI report from 2012 was available for review and the results are as follows:     1. Atrial fibrillation, status post pulmonary vein isolation.      2. Normal left ventricular chamber size and systolic function. LVEF      56%.      3. Normal right ventricular chamber size and systolic function.      4. No significant valvular abnormalities.      5. Normal pulmonary vein anatomy; left superior 14 mm, left inferior 15      mm, right superior 8 mm, right inferior 21 mm (including small      accessory vein).        His other medical issues include back pain, degenerative joint disease, hypertension, borderline diabetes/prediabetes, depression.  He has no previous documented coronary artery disease or valvular heart disease.    Lexiscan Cardiolite stress test in September 2021 showed:  · Findings consistent with an equivocal ECG stress test.  · Left ventricular ejection fraction is normal. (Calculated EF = 67%).  · Myocardial perfusion imaging indicates a normal myocardial perfusion study with no evidence of ischemia.  · Impressions are consistent with a low risk study.    Echocardiogram in October 2021 showed:  · Left ventricular wall thickness is consistent with moderate concentric hypertrophy.  · Estimated left  ventricular EF = 57% Left ventricular ejection fraction appears to be 56 - 60%. Left ventricular systolic function is normal.  · Estimated right ventricular systolic pressure from tricuspid regurgitation is normal (<35 mmHg).    The patient apparently noted that his heart rate was going up to 180 to 200 bpm especially when on the treadmill/exercising and his resting heart rate was also elevated.  I note that metoprolol succinate was discontinued and he was started on diltiazem  mg daily.  He indicates that his heart rate is better with this and has remained stable with his heart rate going up to about 130 bpm when he exercises.  He denied any chest pain, palpitation or shortness of breath.  He indicates that his blood pressure is in the normal range when he checks it at home.    He has had issues with back pain and has seen neurosurgery.  I understand that he may be considered for surgery.    EKG today showed heart rate of around 81 bpm.  Baseline artifact.  Atrial fibrillation.  Nonspecific ST-T changes.  Poor R wave progression anteroseptal leads.    No Known Allergies      Past Medical History:   Diagnosis Date   • Atrial fibrillation (HCC)    • DDD (degenerative disc disease), thoracic    • Hyperlipidemia    • Hypertension    • Lipoma of skin          Past Surgical History:   Procedure Laterality Date   • CARDIAC ABLATION     • COLONOSCOPY     • SHOULDER SURGERY     • UPPER GASTROINTESTINAL ENDOSCOPY      with esophageal dilation   • WISDOM TOOTH EXTRACTION           Family History   Problem Relation Age of Onset   • Arthritis Mother    • Heart disease Mother    • Rheumatic fever Mother    • Hyperlipidemia Father    • Hypertension Father    • Diabetes Father    • Stroke Father    • Prostate cancer Father    • Skin cancer Father    • Cancer Sister    • Alcohol abuse Brother    • No Known Problems Daughter    • No Known Problems Son    • Prostate cancer Paternal Grandfather    • No Known Problems Sister   "        Social History     Socioeconomic History   • Marital status:    Tobacco Use   • Smoking status: Never   • Smokeless tobacco: Never   Vaping Use   • Vaping Use: Never used   Substance and Sexual Activity   • Alcohol use: Not Currently     Comment: none for 20 years   • Drug use: Never   • Sexual activity: Defer         Current Outpatient Medications   Medication Sig Dispense Refill   • Acetaminophen (TYLENOL 8 HOUR PO) Take 1,000 mg by mouth 3 (Three) Times a Day.     • apixaban (ELIQUIS) 5 MG tablet tablet Take 1 tablet by mouth Every 12 (Twelve) Hours. 180 tablet 3   • atorvastatin (Lipitor) 20 MG tablet Take 1 tablet by mouth Daily. 30 tablet 1   • dilTIAZem XR (DILACOR XR) 120 MG 24 hr capsule Take 1 capsule by mouth Daily. 30 capsule 3   • docusate sodium (Colace) 100 MG capsule Take 1 capsule by mouth 2 (Two) Times a Day As Needed for Constipation. 60 capsule 3   • famotidine (Pepcid) 20 MG tablet Take 1 tablet by mouth 2 (Two) Times a Day. 180 tablet 0   • fluticasone (FLONASE) 50 MCG/ACT nasal spray 2 sprays into the nostril(s) as directed by provider Daily.     • Glucosamine-Chondroit-Vit C-Mn (GLUCOSAMINE 1500 COMPLEX PO) Take 1,500 mg by mouth 2 (two) times a day.     • metFORMIN ER (GLUCOPHAGE-XR) 500 MG 24 hr tablet Take 1 tablet by mouth Daily With Breakfast. 30 tablet 3   • Multiple Vitamins-Minerals (MULTIVITAMIN WITH MINERALS) tablet tablet Take 1 tablet by mouth Daily. GETS OTC     • Saw Palmetto 450 MG capsule Take  by mouth 2 (two) times a day. GETS OTC     • tamsulosin (Flomax) 0.4 MG capsule 24 hr capsule Take 1 capsule by mouth Daily. 90 capsule 1   • telmisartan (Micardis) 20 MG tablet Take 1 tablet by mouth Daily. 30 tablet 3   • VITAMIN D, ERGOCALCIFEROL, PO Take  by mouth. GETS OTC       No current facility-administered medications for this visit.         OBJECTIVE    /69 (BP Location: Left arm, Patient Position: Sitting, Cuff Size: Adult)   Ht 177.8 cm (70\")   Wt " 85.8 kg (189 lb 3.2 oz)   BMI 27.15 kg/m²         Review of Systems: The following systems were reviewed and changes noted as indicated below    Constitutional:  Denies recent weight loss, weight gain, fever or chills     HENT:  Denies any hearing loss, epistaxis, hoarseness, or difficulty speaking.     Eyes: Wears eyeglasses or contact lenses     Respiratory: no cough, wheezing, or hemoptysis.     Cardiovascular: Elevated heart rate noted recently.  No chest pain or shortness of breath.    Gastrointestinal:  Denies change in bowel habits, dyspepsia, ulcer disease, hematochezia, or melena.     Endocrine: Negative for cold intolerance, heat intolerance, polydipsia, polyphagia and polyuria.     Genitourinary: Negative.      Musculoskeletal: DJD.  Chronic back pain    Neurological:  Denies any history of recurrent headaches, strokes, TIA, or seizure disorder.     Hematological: Denies any food allergies, seasonal allergies, bleeding disorders, or lymphadenopathy.  History of thrombocytopenia    Psychiatric/Behavioral: Denies any history of depression, substance abuse, or change in cognitive function.       Physical Exam: The following systems are reviewed and no changes noted    Constitutional: Cooperative, alert and oriented, well-developed, well-nourished, in no acute distress.     HENT:   Head: Normocephalic, normal hair patterns, no masses or tenderness.  Ears, Nose, and Throat: No gross abnormalities. No pallor or cyanosis. Dentition good.   Eyes: EOMS intact, PERRL, conjunctivae and lids unremarkable. Fundoscopic exam and visual fields not performed.   Neck: No palpable masses or adenopathy, no thyromegaly, no JVD, carotid pulses are full and equal bilaterally and without bruit.     Cardiovascular: irregular rhythm, S1 variable, S2 normal, no S3 or S4.  No murmurs, gallops, or rubs detected.     Pulmonary/Chest: Chest: normal symmetry, normal respiratory excursion, no intercostal retraction, no use of accessory  muscles.     Pulmonary: Normal breath sounds. No rales or rhonchi.    Abdominal: Abdomen soft, bowel sounds normoactive, no masses, no hepatosplenomegaly, nontender, no bruit.     Musculoskeletal: No deformities, clubbing, cyanosis, erythema, or edema observed.     Neurological: No gross motor or sensory deficits noted, affect appropriate, oriented to time, person, place.     Skin: Warm and dry to the touch, no apparent skin lesion or mass noted.     Psychiatric: He has a normal mood and affect. His behavior is normal. Judgment and thought content normal.         Procedures      Lab Results   Component Value Date    WBC 5.99 11/01/2022    HGB 13.3 11/01/2022    HCT 39.1 11/01/2022    MCV 90.9 11/01/2022     11/01/2022     Lab Results   Component Value Date    GLUCOSE 106 (H) 11/01/2022    BUN 10 11/01/2022    CREATININE 1.28 (H) 11/01/2022    EGFRIFNONA 73 01/24/2022    BCR 7.8 11/01/2022    CO2 25.1 11/01/2022    CALCIUM 9.8 11/01/2022    ALBUMIN 4.50 11/01/2022    AST 28 11/01/2022    ALT 20 11/01/2022     Lab Results   Component Value Date    CHOL 131 11/01/2022    CHOL 121 06/15/2022    CHOL 135 09/29/2021     Lab Results   Component Value Date    TRIG 41 11/01/2022    TRIG 54 06/15/2022    TRIG 52 09/29/2021     Lab Results   Component Value Date    HDL 67 (H) 11/01/2022    HDL 57 06/15/2022    HDL 57 09/29/2021     No components found for: LDLCALC  Lab Results   Component Value Date    LDL 54 11/01/2022    LDL 52 06/15/2022    LDL 66 09/29/2021     No results found for: HDLLDLRATIO  No components found for: CHOLHDL  Lab Results   Component Value Date    HGBA1C 6.00 (H) 11/01/2022     Lab Results   Component Value Date    TSH 0.518 08/31/2020           ASSESSMENT AND PLAN  Sergio Sandoval is a 67-year-old male with history of hypertension, hyperlipidemia, degenerative joint disease, cardiac arrhythmias including supraventricular tachycardia in the remote past and currently atrial fibrillation which appears  to be persistent and present at least since 1998.  He has failed 2 previous ablations for atrial fibrillation.   He is currently rate controlled with underlying atrial fibrillation.  No evidence of angina or congestive heart failure.    I have continued diltiazem  mg daily, anticoagulation with Eliquis, lipid-lowering therapy with simvastatin and antihypertensive therapy with telmisartan HCTZ.    His lipid profiles were in the normal range on 11/1/2022 with an LDL of 54 and HDL of 67.  Creatinine was 1.28.  He has been advised to maintain a high fluid intake.    I understand that he may be considered for back surgery by neurosurgeon.  Based on the information I have he will be low to moderate risk for perioperative cardiac events.  He could go off anticoagulation with Eliquis for 72 hours prior to the procedure and the medication may be restarted when appropriate.      Diagnoses and all orders for this visit:    1. Atrial fibrillation, persistent (HCC) (Primary)  -     ECG 12 Lead    2. Essential hypertension    3. Mixed hyperlipidemia    4. Preoperative cardiovascular examination        Patient's Body mass index is 27.15 kg/m². indicating that he is overweight (BMI 25-29.9). Obesity-related health conditions include the following: hypertension, dyslipidemias and osteoarthritis. Obesity is unchanged. BMI is is above average; BMI management plan is completed. We discussed portion control and increasing exercise..      Sergio Sandoval  reports that he has never smoked. He has never used smokeless tobacco.      Rojas Higuera MD  12/1/2022  09:18 CST

## 2022-12-06 LAB
QT INTERVAL: 360 MS
QTC INTERVAL: 418 MS

## 2022-12-12 RX ORDER — ATORVASTATIN CALCIUM 20 MG/1
20 TABLET, FILM COATED ORAL DAILY
Qty: 30 TABLET | Refills: 1 | Status: SHIPPED | OUTPATIENT
Start: 2022-12-12 | End: 2022-12-20 | Stop reason: SDUPTHER

## 2022-12-16 NOTE — PROGRESS NOTES
Subjective:  Sergio Sandoval is a 67 y.o. male who presents for       Patient Active Problem List   Diagnosis   • Mixed hyperlipidemia   • Essential hypertension   • Vitamin D deficiency   • Gastroesophageal reflux disease   • Benign prostatic hyperplasia with lower urinary tract symptoms   • Benign prostatic hyperplasia with weak urinary stream   • Prediabetes   • Dizziness   • Thrombocytopenia (HCC)   • Atrial fibrillation, controlled (HCC)   • Dyspnea on exertion   • History of thrombocytopenia   • History of prediabetes   • Atrial fibrillation, persistent (HCC)   • Need for pneumococcal vaccine   • Multiple joint pain   • Chronic left shoulder pain   • Chronic midline low back pain   • Pain of left scapula   • Left shoulder tendonitis   • Spinal stenosis of lumbar region   • DDD (degenerative disc disease), lumbar   • Preoperative cardiovascular examination   • Stage 2 chronic kidney disease   • Overweight           Current Outpatient Medications:   •  Acetaminophen (TYLENOL 8 HOUR PO), Take 1,000 mg by mouth 3 (Three) Times a Day., Disp: , Rfl:   •  apixaban (ELIQUIS) 5 MG tablet tablet, Take 1 tablet by mouth Every 12 (Twelve) Hours., Disp: 180 tablet, Rfl: 3  •  atorvastatin (Lipitor) 20 MG tablet, Take 1 tablet by mouth Every Night., Disp: 90 tablet, Rfl: 0  •  dilTIAZem XR (DILACOR XR) 120 MG 24 hr capsule, Take 1 capsule by mouth Daily., Disp: 30 capsule, Rfl: 3  •  docusate sodium (Colace) 100 MG capsule, Take 1 capsule by mouth 2 (Two) Times a Day As Needed for Constipation., Disp: 60 capsule, Rfl: 3  •  famotidine (Pepcid) 20 MG tablet, Take 1 tablet by mouth 2 (Two) Times a Day., Disp: 180 tablet, Rfl: 0  •  fluticasone (FLONASE) 50 MCG/ACT nasal spray, 2 sprays into the nostril(s) as directed by provider Daily., Disp: , Rfl:   •  Glucosamine-Chondroit-Vit C-Mn (GLUCOSAMINE 1500 COMPLEX PO), Take 1,500 mg by mouth 2 (two) times a day., Disp: , Rfl:   •  metFORMIN ER (GLUCOPHAGE-XR) 500 MG 24 hr  tablet, Take 1 tablet by mouth Daily With Breakfast., Disp: 30 tablet, Rfl: 3  •  Multiple Vitamins-Minerals (MULTIVITAMIN WITH MINERALS) tablet tablet, Take 1 tablet by mouth Daily. GETS OTC, Disp: , Rfl:   •  Saw Palmetto 450 MG capsule, Take  by mouth 2 (two) times a day. GETS OTC, Disp: , Rfl:   •  tamsulosin (Flomax) 0.4 MG capsule 24 hr capsule, Take 1 capsule by mouth Daily., Disp: 90 capsule, Rfl: 1  •  telmisartan (Micardis) 20 MG tablet, Take 1 tablet by mouth Daily., Disp: 30 tablet, Rfl: 3  •  VITAMIN D, ERGOCALCIFEROL, PO, Take  by mouth. GETS OTC, Disp: , Rfl:   •  sucralfate (Carafate) 1 GM/10ML suspension, Take 10 mL by mouth 4 (Four) Times a Day., Disp: 414 mL, Rfl: 3    Pt is 66 yo male with management of, GERD, HTN, HLP,  CAD, sp bilateral shoulder surgery, SVT, history of prediabetes, thrombocytopenia, atrial fibrillation sp ablation x 2 , CKD stage 2. History of COVID-19 infection, chronic left shoulder pain. Chronic lower back pain (DDD lumbar spine, mild spinal stenosis)     11/3/22 in office visit for recheck. Pt's medication for Afib was switched from toprol XLto dilacor  mg daily.  Pt has history of persistent a fib and several cardiac ablations. I consulted his Cardiologsit Dr. Quick regarding this. Pt had labwork on 11/1/22 that showed lipid penal  HDL at 67. hga1c at 6.00. CMP showed CR At 1.28 with gFR at 61.7 from 76.5. CBC Showed stable hemoglobin and WBC. Pt states his HR  Is better and running in 70s. No chest pain no dizziness. BP is on lower side today. At home it has it is stable.  He also saw Dr. Crawford and has pending surgery on his lower back in Januar 2023.      12/20/22 in office visit for recheck. Pt saw Orthopedic on 11/29/22 for his rigth shoulder pain and was given injection in right shoulder. He also saw Cardiologist on 12/1/22 for his persistent atrial fibrillation and is to continue dilacor  mg daily along with eliquis he was considered low to moderate  risk for pending surgeyr of his neck and he is to stop eliquis 72 hours prior to surgery. BP and HR stasble today.  He is having issues with his GERD and is taking pepcid 20 mg PO BID. His last EGD was 2 years ago in [facility] and was told he had esophagitis and had dilation of esophageal stricture. He has trouble swallowing.          Heartburn  He complains of abdominal pain. He reports no belching, no chest pain, no choking, no coughing, no dysphagia, no early satiety, no globus sensation, no heartburn, no hoarse voice, no nausea, no sore throat, no stridor, no tooth decay, no water brash or no wheezing. This is a recurrent problem. The current episode started more than 1 month ago. The problem occurs constantly. Nothing aggravates the symptoms. Associated symptoms include fatigue. He has tried an antacid for the symptoms. The treatment provided no relief. Past procedures do not include an abdominal ultrasound, an EGD, esophageal manometry, H. pylori antibody titer or a UGI. Past invasive treatments do not include gastroplasty, gastroplication or reflux surgery.   Atrial Fibrillation  Presents for follow-up visit. Symptoms include weakness. Symptoms are negative for an AICD problem, bradycardia, chest pain, dizziness, hypertension, hypotension, pacemaker problem, palpitations, shortness of breath, syncope and tachycardia. The symptoms have been stable. Past medical history includes atrial fibrillation.   Back Pain  This is a chronic problem. The current episode started more than 1 month ago. The problem occurs constantly. The pain is present in the gluteal and lumbar spine. The quality of the pain is described as aching. The pain is at a severity of 5/10. The symptoms are aggravated by bending and position. Stiffness is present all day. Associated symptoms include dysuria, numbness and weakness. Pertinent negatives include no abdominal pain, bladder incontinence, bowel incontinence, chest pain, fever, headaches, leg  pain, paresis, paresthesias, pelvic pain, perianal numbness, tingling or weight loss. He has tried NSAIDs (neurontin cymbalta ) for the symptoms. The treatment provided moderate relief.   Shoulder Injury   The incident occurred at home. The left shoulder is affected. The incident occurred more than 1 week ago. There was no injury mechanism. The quality of the pain is described as shooting. Associated symptoms include numbness. Pertinent negatives include no chest pain or tingling. The symptoms are aggravated by movement, overhead lifting and palpation. Treatments tried: neurontin. The treatment provided no relief.   Chronic Kidney Disease  This is a chronic problem. The current episode started more than 1 year ago. The problem occurs constantly. The problem has been unchanged. Associated symptoms include arthralgias. Pertinent negatives include no abdominal pain, anorexia, change in bowel habit, chest pain, chills, congestion, coughing, fatigue, fever, headaches, joint swelling, myalgias, nausea, neck pain, numbness, sore throat, swollen glands, urinary symptoms, vertigo, visual change, vomiting or weakness. Nothing aggravates the symptoms. He has tried nothing for the symptoms. The treatment provided no relief.   Heart Problem  This is a chronic problem. The current episode started more than 1 year ago. The problem occurs constantly. The problem has been unchanged. Associated symptoms include arthralgias and fatigue. Pertinent negatives include no abdominal pain, anorexia, change in bowel habit, chest pain, chills, congestion, coughing, diaphoresis, fever, headaches, joint swelling, myalgias, nausea, neck pain, numbness, sore throat, swollen glands, urinary symptoms, vertigo, visual change, vomiting or weakness. The symptoms are aggravated by bending. Treatments tried: flecanaide  The treatment provided significant relief.   Hypertension   This is a chronic problem. The current episode started more than 1 month  ago. The problem is unchanged. The problem is uncontrolled. Pertinent negatives include no anxiety, blurred vision, chest pain, headaches, malaise/fatigue, neck pain, orthopnea, palpitations, peripheral edema, PND, shortness of breath or sweats. Risk factors for coronary artery disease include male gender and dyslipidemia. Past treatments include beta blockers, angiotensin blockers and diuretics. Current antihypertension treatment includes beta blockers, angiotensin blockers and diuretics. The current treatment provides moderate improvement. There are no compliance problems.  There is no history of angina, kidney disease, CAD/MI, CVA, heart failure, left ventricular hypertrophy, PVD or retinopathy. There is no history of chronic renal disease, coarctation of the aorta, hyperaldosteronism, hypercortisolism, hyperparathyroidism, a hypertension causing med, pheochromocytoma, renovascular disease, sleep apnea or a thyroid problem.   Hyperlipidemia   This is a chronic problem. The current episode started more than 1 year ago. He has no history of chronic renal disease. Pertinent negatives include no chest pain or shortness of breath. The current treatment provides mild improvement of lipids. There are no compliance problems.     Male  Problem   The patient's pertinent negatives include no genital injury, genital itching, genital lesions, pelvic pain, penile discharge, penile pain, priapism, scrotal swelling or testicular pain. Primary symptoms comment: difficulty urinating. This is a new problem. The problem occurs constantly. The problem has been improving  Pertinent negatives include no abdominal pain, anorexia, chest pain, chills, constipation, coughing, diarrhea, discolored urine, dysuria, fever, flank pain, frequency, headaches, hematuria, hesitancy, joint pain, joint swelling, nausea, painful intercourse, rash, shortness of breath, urgency, urinary retention or vomiting. Nothing aggravates the symptoms. He has  tried nothing for the symptoms. The treatment provided no relief. He is sexually active. There is no history of chlamydia, cryptorchidism, erectile aid use, erectile dysfunction, a femoral hernia, gonorrhea, herpes simplex, HIV, an inguinal hernia, kidney stones, prostatitis, sickle cell disease, syphilis or varicocele. Has tried flomax and had significant relief     Review of Systems  Review of Systems   Constitutional: Positive for activity change and fatigue. Negative for appetite change, chills, diaphoresis and fever.   HENT: Negative for congestion, hoarse voice, postnasal drip, rhinorrhea, sinus pressure, sinus pain, sneezing, sore throat, trouble swallowing and voice change.    Respiratory: Negative for cough, choking, chest tightness, shortness of breath, wheezing and stridor.    Cardiovascular: Negative for chest pain.   Gastrointestinal: Positive for abdominal pain. Negative for diarrhea, dysphagia, heartburn, nausea and vomiting.   Musculoskeletal: Positive for arthralgias.   Neurological: Positive for weakness and numbness. Negative for headaches.       Patient Active Problem List   Diagnosis   • Mixed hyperlipidemia   • Essential hypertension   • Vitamin D deficiency   • Gastroesophageal reflux disease   • Benign prostatic hyperplasia with lower urinary tract symptoms   • Benign prostatic hyperplasia with weak urinary stream   • Prediabetes   • Dizziness   • Thrombocytopenia (HCC)   • Atrial fibrillation, controlled (HCC)   • Dyspnea on exertion   • History of thrombocytopenia   • History of prediabetes   • Atrial fibrillation, persistent (HCC)   • Need for pneumococcal vaccine   • Multiple joint pain   • Chronic left shoulder pain   • Chronic midline low back pain   • Pain of left scapula   • Left shoulder tendonitis   • Spinal stenosis of lumbar region   • DDD (degenerative disc disease), lumbar   • Preoperative cardiovascular examination   • Stage 2 chronic kidney disease   • Overweight     Past  Surgical History:   Procedure Laterality Date   • CARDIAC ABLATION     • COLONOSCOPY     • SHOULDER SURGERY     • UPPER GASTROINTESTINAL ENDOSCOPY      with esophageal dilation   • WISDOM TOOTH EXTRACTION       Social History     Socioeconomic History   • Marital status:    Tobacco Use   • Smoking status: Never   • Smokeless tobacco: Never   Vaping Use   • Vaping Use: Never used   Substance and Sexual Activity   • Alcohol use: Not Currently     Comment: none for 20 years   • Drug use: Never   • Sexual activity: Defer     Family History   Problem Relation Age of Onset   • Arthritis Mother    • Heart disease Mother    • Rheumatic fever Mother    • Hyperlipidemia Father    • Hypertension Father    • Diabetes Father    • Stroke Father    • Prostate cancer Father    • Skin cancer Father    • Cancer Sister    • Alcohol abuse Brother    • No Known Problems Daughter    • No Known Problems Son    • Prostate cancer Paternal Grandfather    • No Known Problems Sister      Office Visit on 12/01/2022   Component Date Value Ref Range Status   • QT Interval 12/01/2022 360  ms Final   • QTC Interval 12/01/2022 418  ms Final   Lab on 11/01/2022   Component Date Value Ref Range Status   • WBC 11/01/2022 5.99  3.40 - 10.80 10*3/mm3 Final   • RBC 11/01/2022 4.30  4.14 - 5.80 10*6/mm3 Final   • Hemoglobin 11/01/2022 13.3  13.0 - 17.7 g/dL Final   • Hematocrit 11/01/2022 39.1  37.5 - 51.0 % Final   • MCV 11/01/2022 90.9  79.0 - 97.0 fL Final   • MCH 11/01/2022 30.9  26.6 - 33.0 pg Final   • MCHC 11/01/2022 34.0  31.5 - 35.7 g/dL Final   • RDW 11/01/2022 12.0 (L)  12.3 - 15.4 % Final   • RDW-SD 11/01/2022 40.2  37.0 - 54.0 fl Final   • MPV 11/01/2022 10.3  6.0 - 12.0 fL Final   • Platelets 11/01/2022 204  140 - 450 10*3/mm3 Final   • Neutrophil % 11/01/2022 66.3  42.7 - 76.0 % Final   • Lymphocyte % 11/01/2022 24.0  19.6 - 45.3 % Final   • Monocyte % 11/01/2022 8.3  5.0 - 12.0 % Final   • Eosinophil % 11/01/2022 0.8  0.3 - 6.2 %  Final   • Basophil % 11/01/2022 0.3  0.0 - 1.5 % Final   • Immature Grans % 11/01/2022 0.3  0.0 - 0.5 % Final   • Neutrophils, Absolute 11/01/2022 3.96  1.70 - 7.00 10*3/mm3 Final   • Lymphocytes, Absolute 11/01/2022 1.44  0.70 - 3.10 10*3/mm3 Final   • Monocytes, Absolute 11/01/2022 0.50  0.10 - 0.90 10*3/mm3 Final   • Eosinophils, Absolute 11/01/2022 0.05  0.00 - 0.40 10*3/mm3 Final   • Basophils, Absolute 11/01/2022 0.02  0.00 - 0.20 10*3/mm3 Final   • Immature Grans, Absolute 11/01/2022 0.02  0.00 - 0.05 10*3/mm3 Final   • nRBC 11/01/2022 0.0  0.0 - 0.2 /100 WBC Final   • Glucose 11/01/2022 106 (H)  65 - 99 mg/dL Final   • BUN 11/01/2022 10  8 - 23 mg/dL Final   • Creatinine 11/01/2022 1.28 (H)  0.76 - 1.27 mg/dL Final   • Sodium 11/01/2022 142  136 - 145 mmol/L Final   • Potassium 11/01/2022 4.2  3.5 - 5.2 mmol/L Final   • Chloride 11/01/2022 106  98 - 107 mmol/L Final   • CO2 11/01/2022 25.1  22.0 - 29.0 mmol/L Final   • Calcium 11/01/2022 9.8  8.6 - 10.5 mg/dL Final   • Total Protein 11/01/2022 6.6  6.0 - 8.5 g/dL Final   • Albumin 11/01/2022 4.50  3.50 - 5.20 g/dL Final   • ALT (SGPT) 11/01/2022 20  1 - 41 U/L Final   • AST (SGOT) 11/01/2022 28  1 - 40 U/L Final   • Alkaline Phosphatase 11/01/2022 67  39 - 117 U/L Final   • Total Bilirubin 11/01/2022 0.5  0.0 - 1.2 mg/dL Final   • Globulin 11/01/2022 2.1  gm/dL Final   • A/G Ratio 11/01/2022 2.1  g/dL Final   • BUN/Creatinine Ratio 11/01/2022 7.8  7.0 - 25.0 Final   • Anion Gap 11/01/2022 10.9  5.0 - 15.0 mmol/L Final   • eGFR 11/01/2022 61.7  >60.0 mL/min/1.73 Final    National Kidney Foundation and American Society of Nephrology (ASN) Task Force recommended calculation based on the Chronic Kidney Disease Epidemiology Collaboration (CKD-EPI) equation refit without adjustment for race.   • Hemoglobin A1C 11/01/2022 6.00 (H)  4.80 - 5.60 % Final   • Total Cholesterol 11/01/2022 131  0 - 200 mg/dL Final   • Triglycerides 11/01/2022 41  0 - 150 mg/dL Final   •  HDL Cholesterol 11/01/2022 67 (H)  40 - 60 mg/dL Final   • LDL Cholesterol  11/01/2022 54  0 - 100 mg/dL Final   • VLDL Cholesterol 11/01/2022 10  5 - 40 mg/dL Final   • LDL/HDL Ratio 11/01/2022 0.83   Final   • 25 Hydroxy, Vitamin D 11/01/2022 51.5  30.0 - 100.0 ng/ml Final   • Vitamin B-12 11/01/2022 565  211 - 946 pg/mL Final   Lab on 09/02/2022   Component Date Value Ref Range Status   • PSA 09/02/2022 1.240  0.000 - 4.000 ng/mL Final   Clinical Support on 07/25/2022   Component Date Value Ref Range Status   • SARS Antigen 07/25/2022 Not Detected  Not Detected, Presumptive Negative Final   • Influenza A Antigen ESTELLA 07/25/2022 Not Detected  Not Detected Final   • Influenza B Antigen ESTELLA 07/25/2022 Not Detected  Not Detected Final   • Internal Control 07/25/2022 Passed  Passed Final   • Lot Number 07/25/2022 [id number]   Final   • Expiration Date 07/25/2022 [id number]   Final   • COVID19 07/25/2022 Not Detected  Not Detected - Ref. Range Final      XR Shoulder 2+ View Right  Narrative: EXAM: XR SHOULDER 2 OR MORE VIEWS RIGHT    HISTORY: pain, M25.511 Pain in right shoulder.    COMPARISON: None    FINDINGS:    Mineralization: Osseous demineralization    Bones: Glenohumeral joint space narrowing. Small humeral  osteophytes. Small pseudocysts lateral humeral head. Prior  resection distal clavicle.    Soft tissues: Normal  Impression: Glenohumeral osteoarthritis    Electronically signed by:  Erick Patricio DO  11/29/2022 9:51 AM  [facility] Workstation: [id number]    [email or web address]  Immunization History   Administered Date(s) Administered   • COVID-19 (MODERNA) 1st, 2nd, 3rd Dose Only 01/06/2021, 02/03/2021   • FluLaval/Fluzone >6mos 09/15/2020   • Fluzone High-Dose 65+yrs 09/29/2021, 10/19/2022   • Pneumococcal Polysaccharide (PPSV23) 10/28/2021       The following portions of the patient's history were reviewed and updated as appropriate: allergies, current medications, past family history, past medical history, past social  "history, past surgical history and problem list.        Physical Exam   /76 (BP Location: Left arm, Patient Position: Sitting, Cuff Size: Adult)   Pulse 88   Temp 98.1 °F (36.7 °C)   Ht 177.8 cm (70\")   Wt 85.3 kg (188 lb)   SpO2 96%   BMI 26.98 kg/m²     /76 (BP Location: Left arm, Patient Position: Sitting, Cuff Size: Adult)   Pulse 88   Temp 98.1 °F (36.7 °C)   Ht 177.8 cm (70\")   Wt 85.3 kg (188 lb)   SpO2 96%   BMI 26.98 kg/m²     Physical Exam  Vitals and nursing note reviewed.   Constitutional:       Appearance: He is well-developed. He is not diaphoretic.   HENT:      Head: Normocephalic and atraumatic.      Right Ear: External ear normal.   Eyes:      Conjunctiva/sclera: Conjunctivae normal.      Pupils: Pupils are equal, round, and reactive to light.   Cardiovascular:      Rate and Rhythm: Normal rate. Rhythm irregular.      Heart sounds: Normal heart sounds. No murmur heard.  Pulmonary:      Effort: Pulmonary effort is normal. No respiratory distress.      Breath sounds: Normal breath sounds.   Abdominal:      General: Bowel sounds are normal. There is no distension.      Palpations: Abdomen is soft.      Tenderness: There is no abdominal tenderness.   Musculoskeletal:         General: Tenderness present. No deformity.      Cervical back: Normal range of motion and neck supple.      Lumbar back: Spasms, tenderness and bony tenderness present. Decreased range of motion.   Skin:     General: Skin is warm.      Coloration: Skin is not pale.      Findings: No erythema or rash.   Neurological:      Mental Status: He is alert and oriented to person, place, and time.      Cranial Nerves: No cranial nerve deficit.   Psychiatric:         Behavior: Behavior normal.         [unfilled]   Diagnosis Plan   1. Gastroesophageal reflux disease, unspecified whether esophagitis present  famotidine (Pepcid) 20 MG tablet    Ambulatory Referral to Gastroenterology      2. Essential hypertension   "      3. Atrial fibrillation, persistent (HCC)        4. Mixed hyperlipidemia        5. Thrombocytopenia (HCC)        6. Prediabetes        7. Vitamin D deficiency        8. Stage 2 chronic kidney disease        9. Overweight        10. DDD (degenerative disc disease), lumbar        11. Spinal stenosis of lumbar region, unspecified whether neurogenic claudication present        12. Chronic midline low back pain, unspecified whether sciatica present        13. Epigastric abdominal pain  Ambulatory Referral to Gastroenterology                  -went over labwork   -Left shoulder pain/scapula pain -reviewed MRI of left shoulder Orthopedic following   -lower back pain/spondylosis of lumbar spine/lumbar stenosis  - reviewed MRI of lumbar spine Pt failed PT/OT.Neurosurgery following has pending surgery. Was on cymbalta and neurontin. Will need to stop eliquis 72 hours prior to surgery   -overweight -BMI at 26.98  -prediabetes - on metformin  mg daily   -CKD stage 2 -Nephrology following   -family history prostate cancer/difficulty urinating/BPH - recent PSA normal .recent US of prostate shows enlarged prostate . on flomax 0.4 m PO qhs. Will refer to Urology   -SVT/atrial fibrillation    on toprol XL XR 50 mg daily. On eliquis.   Cardiology following. Recent stress test was low risk study. HR elevated stopped  toprol XL 50 mg daily and switched to dilacor  mg daily. Drug information provided. Pt advised to make an appt with Dr. Quick   -[facility] - stop simvastatin  80 mg PO qhs recommend heart healthy diet start on lipitor 20 mg PO qhs   -HTN  - on micadis 40 PO q daily,  on dilacor  mg daily. Will cut back on micardis from 40 to 20 mg daily.  BP on lower side today. Advised pt to monitor blood pressure at home   -GERD/history of esophageal stricture  -on pepcid 20 mg PO BID. Likely will need EGD.  Will start on carafate 1 gram PO QID. Will refer to Gastroenteorlog. Recommend bland diet.     -vitamin D  defiicency -vitamin D once a week   -advised pt to be safe and call with questions and concerns  -advised pt to go to ER or call 911 if symptoms worrisome or severe  -advised pt to followup with specialist and referrals  -advised pt to be safe during COVID-19 pandemic  I spent 30  minutes caring for Sergio on this date of service. This time includes time spent by me in the following activities: preparing for the visit, reviewing tests, obtaining and/or reviewing a separately obtained history, performing a medically appropriate examination and/or evaluation, counseling and educating the patient/family/caregiver, ordering medications, tests, or procedures, referring and communicating with other health care professionals, documenting information in the medical record, independently interpreting results and communicating that information with the patient/family/caregiver and care coordination  -recheck in 1 month          This document has been electronically signed by Oneil Jorgensen MD on December 20, 2022 09:07 CST

## 2022-12-20 ENCOUNTER — OFFICE VISIT (OUTPATIENT)
Dept: FAMILY MEDICINE CLINIC | Facility: CLINIC | Age: 67
End: 2022-12-20

## 2022-12-20 VITALS
DIASTOLIC BLOOD PRESSURE: 76 MMHG | SYSTOLIC BLOOD PRESSURE: 120 MMHG | HEIGHT: 70 IN | OXYGEN SATURATION: 96 % | HEART RATE: 88 BPM | BODY MASS INDEX: 26.92 KG/M2 | WEIGHT: 188 LBS | TEMPERATURE: 98.1 F

## 2022-12-20 DIAGNOSIS — D69.6 THROMBOCYTOPENIA: ICD-10-CM

## 2022-12-20 DIAGNOSIS — R10.13 EPIGASTRIC ABDOMINAL PAIN: ICD-10-CM

## 2022-12-20 DIAGNOSIS — M51.36 DDD (DEGENERATIVE DISC DISEASE), LUMBAR: ICD-10-CM

## 2022-12-20 DIAGNOSIS — E78.2 MIXED HYPERLIPIDEMIA: ICD-10-CM

## 2022-12-20 DIAGNOSIS — K21.9 GASTROESOPHAGEAL REFLUX DISEASE, UNSPECIFIED WHETHER ESOPHAGITIS PRESENT: Primary | ICD-10-CM

## 2022-12-20 DIAGNOSIS — I48.19 ATRIAL FIBRILLATION, PERSISTENT: ICD-10-CM

## 2022-12-20 DIAGNOSIS — I10 ESSENTIAL HYPERTENSION: ICD-10-CM

## 2022-12-20 DIAGNOSIS — R73.03 PREDIABETES: ICD-10-CM

## 2022-12-20 DIAGNOSIS — M48.061 SPINAL STENOSIS OF LUMBAR REGION, UNSPECIFIED WHETHER NEUROGENIC CLAUDICATION PRESENT: ICD-10-CM

## 2022-12-20 DIAGNOSIS — M54.50 CHRONIC MIDLINE LOW BACK PAIN, UNSPECIFIED WHETHER SCIATICA PRESENT: ICD-10-CM

## 2022-12-20 DIAGNOSIS — G89.29 CHRONIC MIDLINE LOW BACK PAIN, UNSPECIFIED WHETHER SCIATICA PRESENT: ICD-10-CM

## 2022-12-20 DIAGNOSIS — N18.2 STAGE 2 CHRONIC KIDNEY DISEASE: ICD-10-CM

## 2022-12-20 DIAGNOSIS — E66.3 OVERWEIGHT: ICD-10-CM

## 2022-12-20 DIAGNOSIS — E55.9 VITAMIN D DEFICIENCY: ICD-10-CM

## 2022-12-20 PROCEDURE — 99214 OFFICE O/P EST MOD 30 MIN: CPT | Performed by: FAMILY MEDICINE

## 2022-12-20 RX ORDER — SUCRALFATE ORAL 1 G/10ML
1 SUSPENSION ORAL 4 TIMES DAILY
Qty: 414 ML | Refills: 3 | Status: SHIPPED | OUTPATIENT
Start: 2022-12-20

## 2022-12-20 RX ORDER — FAMOTIDINE 20 MG/1
20 TABLET, FILM COATED ORAL 2 TIMES DAILY
Qty: 180 TABLET | Refills: 0 | Status: SHIPPED | OUTPATIENT
Start: 2022-12-20 | End: 2023-01-18 | Stop reason: SDUPTHER

## 2022-12-20 RX ORDER — ATORVASTATIN CALCIUM 20 MG/1
20 TABLET, FILM COATED ORAL NIGHTLY
Qty: 90 TABLET | Refills: 0 | Status: SHIPPED | OUTPATIENT
Start: 2022-12-20 | End: 2023-01-18 | Stop reason: SDUPTHER

## 2022-12-28 ENCOUNTER — TELEPHONE (OUTPATIENT)
Dept: FAMILY MEDICINE CLINIC | Facility: CLINIC | Age: 67
End: 2022-12-28

## 2023-01-12 ENCOUNTER — OFFICE VISIT (OUTPATIENT)
Dept: GASTROENTEROLOGY | Facility: CLINIC | Age: 68
End: 2023-01-12
Payer: MEDICARE

## 2023-01-12 VITALS
WEIGHT: 185 LBS | BODY MASS INDEX: 26.48 KG/M2 | HEART RATE: 79 BPM | SYSTOLIC BLOOD PRESSURE: 141 MMHG | DIASTOLIC BLOOD PRESSURE: 91 MMHG | HEIGHT: 70 IN

## 2023-01-12 DIAGNOSIS — Z87.19 HISTORY OF ESOPHAGEAL STRICTURE: ICD-10-CM

## 2023-01-12 DIAGNOSIS — K21.00 GASTROESOPHAGEAL REFLUX DISEASE WITH ESOPHAGITIS WITHOUT HEMORRHAGE: ICD-10-CM

## 2023-01-12 DIAGNOSIS — R13.19 ESOPHAGEAL DYSPHAGIA: Primary | ICD-10-CM

## 2023-01-12 PROCEDURE — 99214 OFFICE O/P EST MOD 30 MIN: CPT | Performed by: NURSE PRACTITIONER

## 2023-01-12 RX ORDER — METHOCARBAMOL 750 MG/1
750 TABLET, FILM COATED ORAL EVERY 8 HOURS PRN
COMMUNITY
Start: 2022-12-29

## 2023-01-12 RX ORDER — DEXTROSE AND SODIUM CHLORIDE 5; .45 G/100ML; G/100ML
30 INJECTION, SOLUTION INTRAVENOUS CONTINUOUS PRN
Status: CANCELLED | OUTPATIENT
Start: 2023-02-15

## 2023-01-12 RX ORDER — HYDROCODONE BITARTRATE AND ACETAMINOPHEN 10; 325 MG/1; MG/1
1 TABLET ORAL EVERY 4 HOURS PRN
COMMUNITY
Start: 2022-12-29 | End: 2023-02-10

## 2023-01-12 RX ORDER — SODIUM CHLORIDE 9 MG/ML
40 INJECTION, SOLUTION INTRAVENOUS AS NEEDED
Status: CANCELLED | OUTPATIENT
Start: 2023-01-12

## 2023-01-12 NOTE — PROGRESS NOTES
Chief Complaint   Patient presents with   • Heartburn   • Abdominal Pain       Subjective    Sergio Sandoval is a 67 y.o. male. he is here today for follow-up.                                                                  Assessment & Plan                                     1. Esophageal dysphagia    2. History of esophageal stricture    3. Gastroesophageal reflux disease with esophagitis without hemorrhage      Plan; schedule patient for EGD with dilation if indicated due to dysphagia, history of esophageal stricture and uncontrolled reflux.  Continue Pepcid daily and Carafate as needed    Follow-up: Return in about 4 weeks (around 2/9/2023) for Recheck, After test.     HPI  67-year-old male presents to discuss dysphagia history of esophageal stricture and chronic GERD.  [place] [facility] [place] [facility] change he has to follow-up with [facility].  Reports reflux is always been well controlled with PPI but when he established with nephrology PPI was stopped and he was started on H2 blocker which does not control his symptoms very well.  PPI was discontinued about 2 years ago and about a year ago began having more frequent acid indigestion and dysphagia with more dry food and pills typically.  He denies any nausea vomiting or abdominal pain.  Reports bowel movements are regular somewhat constipated due to recent changes in medication  Reports colonoscopy was completed in 2017 or 2018 with repeat recommended in 10 years for surveillance  Review of Systems  Review of Systems   Constitutional: Positive for fatigue. Negative for activity change, appetite change, chills, diaphoresis, fever and unexpected weight change.   HENT: Positive for trouble swallowing (restarted about a year ago progressively worsened more with dry items and pills ).    Respiratory: Negative for  "cough and shortness of breath.    Gastrointestinal: Positive for constipation (on pain meds since back surgery ). Negative for abdominal distention, abdominal pain, anal bleeding, blood in stool, diarrhea, nausea, rectal pain and vomiting.       /91 (BP Location: Left arm)   Pulse 79   Ht 177.8 cm (70\")   Wt 83.9 kg (185 lb)   BMI 26.54 kg/m²     Objective      Physical Exam  Constitutional:       General: He is not in acute distress.     Appearance: Normal appearance. He is normal weight. He is not ill-appearing.   HENT:      Head: Normocephalic and atraumatic.   Abdominal:      General: Abdomen is flat. Bowel sounds are normal. There is no distension.      Palpations: Abdomen is soft. There is no mass.      Tenderness: There is no abdominal tenderness.   Neurological:      Mental Status: He is alert.               The following portions of the patient's history were reviewed and updated as appropriate:   Past Medical History:   Diagnosis Date   • Atrial fibrillation (HCC)    • DDD (degenerative disc disease), thoracic    • Hyperlipidemia    • Hypertension    • Lipoma of skin      Past Surgical History:   Procedure Laterality Date   • CARDIAC ABLATION     • COLONOSCOPY     • SHOULDER SURGERY     • UPPER GASTROINTESTINAL ENDOSCOPY      with esophageal dilation   • WISDOM TOOTH EXTRACTION       Family History   Problem Relation Age of Onset   • Arthritis Mother    • Heart disease Mother    • Rheumatic fever Mother    • Hyperlipidemia Father    • Hypertension Father    • Diabetes Father    • Stroke Father    • Prostate cancer Father    • Skin cancer Father    • Cancer Sister    • Alcohol abuse Brother    • No Known Problems Daughter    • No Known Problems Son    • Prostate cancer Paternal Grandfather    • No Known Problems Sister        No Known Allergies  Social History     Socioeconomic History   • Marital status:    Tobacco Use   • Smoking status: Never   • Smokeless tobacco: Never   Vaping Use   • " Vaping Use: Never used   Substance and Sexual Activity   • Alcohol use: Not Currently     Comment: none for 20 years   • Drug use: Never   • Sexual activity: Defer     Current Medications:  Prior to Admission medications    Medication Sig Start Date End Date Taking? Authorizing Provider   Acetaminophen (TYLENOL 8 HOUR PO) Take 1,000 mg by mouth 3 (Three) Times a Day.   Yes Jorge Luis Thomas MD   apixaban (ELIQUIS) 5 MG tablet tablet Take 1 tablet by mouth Every 12 (Twelve) Hours. 8/5/22  Yes Oneil Jorgensen MD   atorvastatin (Lipitor) 20 MG tablet Take 1 tablet by mouth Every Night. 12/20/22  Yes Oneil Jorgensen MD   dilTIAZem XR (DILACOR XR) 120 MG 24 hr capsule Take 1 capsule by mouth Daily. 10/19/22  Yes Oneil Jorgensen MD   docusate sodium (Colace) 100 MG capsule Take 1 capsule by mouth 2 (Two) Times a Day As Needed for Constipation. 11/15/22  Yes Oneil Jorgensen MD   famotidine (Pepcid) 20 MG tablet Take 1 tablet by mouth 2 (Two) Times a Day. 12/20/22  Yes Oneil Jorgensen MD   fluticasone (FLONASE) 50 MCG/ACT nasal spray 2 sprays into the nostril(s) as directed by provider Daily.   Yes Jorge Luis Thomas MD   Glucosamine-Chondroit-Vit C-Mn (GLUCOSAMINE 1500 COMPLEX PO) Take 1,500 mg by mouth 2 (two) times a day.   Yes Jorge Luis Thomas MD   HYDROcodone-acetaminophen (NORCO)  MG per tablet Take 1 tablet by mouth Every 4 (Four) Hours As Needed. for pain 12/29/22  Yes Jorge Luis Thomas MD   metFORMIN ER (GLUCOPHAGE-XR) 500 MG 24 hr tablet Take 1 tablet by mouth Daily With Breakfast. 10/19/22  Yes Oneil Jorgensen MD   methocarbamol (ROBAXIN) 750 MG tablet Take 750 mg by mouth Every 8 (Eight) Hours As Needed. 12/29/22  Yes Jorge Luis Thomas MD   Multiple Vitamins-Minerals (MULTIVITAMIN WITH MINERALS) tablet tablet Take 1 tablet by mouth Daily. GETS OTC   Yes Jorge Luis Thomas MD   Saw Palmetto 450 MG capsule Take  by mouth 2 (two) times a day. GETS OTC   Yes Jorge Luis Thomas  MD   sucralfate (Carafate) 1 GM/10ML suspension Take 10 mL by mouth 4 (Four) Times a Day. 12/20/22  Yes Oneil Jorgensen MD   tamsulosin (Flomax) 0.4 MG capsule 24 hr capsule Take 1 capsule by mouth Daily. 10/19/22  Yes Oneil Jorgensen MD   telmisartan (Micardis) 20 MG tablet Take 1 tablet by mouth Daily. 11/3/22  Yes Oneil Jorgensen MD   VITAMIN D, ERGOCALCIFEROL, PO Take  by mouth. GETS OTC   Yes Provider, MD Jorge Luis     Orders placed during this encounter include:  Orders Placed This Encounter   Procedures   • Obtain Informed Consent     Standing Status:   Future     Order Specific Question:   Informed Consent Given For     Answer:   ESOPHAGOGASTRODUODENOSCOPY possible dilation     ESOPHAGOGASTRODUODENOSCOPY possible dilation (N/A)  No orders of the defined types were placed in this encounter.        Review and/or summary of lab tests, radiology, procedures, medications. Review and summary of old records and obtaining of history. The risks and benefits of my recommendations, as well as other treatment options were discussed . Any questions/concerned were answered. Patient voiced understanding and agreement.          This document has been electronically signed by ERIN Marks on January 12, 2023 12:38 CST                                               Results for orders placed or performed in visit on 12/01/22   ECG 12 Lead   Result Value Ref Range    QT Interval 360 ms    QTC Interval 418 ms   Results for orders placed or performed in visit on 11/01/22   CBC Auto Differential    Specimen: Blood   Result Value Ref Range    WBC 5.99 3.40 - 10.80 10*3/mm3    RBC 4.30 4.14 - 5.80 10*6/mm3    Hemoglobin 13.3 13.0 - 17.7 g/dL    Hematocrit 39.1 37.5 - 51.0 %    MCV 90.9 79.0 - 97.0 fL    MCH 30.9 26.6 - 33.0 pg    MCHC 34.0 31.5 - 35.7 g/dL    RDW 12.0 (L) 12.3 - 15.4 %    RDW-SD 40.2 37.0 - 54.0 fl    MPV 10.3 6.0 - 12.0 fL    Platelets 204 140 - 450 10*3/mm3    Neutrophil % 66.3 42.7 - 76.0 %     Lymphocyte % 24.0 19.6 - 45.3 %    Monocyte % 8.3 5.0 - 12.0 %    Eosinophil % 0.8 0.3 - 6.2 %    Basophil % 0.3 0.0 - 1.5 %    Immature Grans % 0.3 0.0 - 0.5 %    Neutrophils, Absolute 3.96 1.70 - 7.00 10*3/mm3    Lymphocytes, Absolute 1.44 0.70 - 3.10 10*3/mm3    Monocytes, Absolute 0.50 0.10 - 0.90 10*3/mm3    Eosinophils, Absolute 0.05 0.00 - 0.40 10*3/mm3    Basophils, Absolute 0.02 0.00 - 0.20 10*3/mm3    Immature Grans, Absolute 0.02 0.00 - 0.05 10*3/mm3    nRBC 0.0 0.0 - 0.2 /100 WBC   Vitamin D 25 Hydroxy    Specimen: Blood   Result Value Ref Range    25 Hydroxy, Vitamin D 51.5 30.0 - 100.0 ng/ml   Hemoglobin A1c    Specimen: Blood   Result Value Ref Range    Hemoglobin A1C 6.00 (H) 4.80 - 5.60 %   Vitamin B12    Specimen: Blood   Result Value Ref Range    Vitamin B-12 565 211 - 946 pg/mL   Lipid Panel    Specimen: Blood   Result Value Ref Range    Total Cholesterol 131 0 - 200 mg/dL    Triglycerides 41 0 - 150 mg/dL    HDL Cholesterol 67 (H) 40 - 60 mg/dL    LDL Cholesterol  54 0 - 100 mg/dL    VLDL Cholesterol 10 5 - 40 mg/dL    LDL/HDL Ratio 0.83    Comprehensive Metabolic Panel    Specimen: Blood   Result Value Ref Range    Glucose 106 (H) 65 - 99 mg/dL    BUN 10 8 - 23 mg/dL    Creatinine 1.28 (H) 0.76 - 1.27 mg/dL    Sodium 142 136 - 145 mmol/L    Potassium 4.2 3.5 - 5.2 mmol/L    Chloride 106 98 - 107 mmol/L    CO2 25.1 22.0 - 29.0 mmol/L    Calcium 9.8 8.6 - 10.5 mg/dL    Total Protein 6.6 6.0 - 8.5 g/dL    Albumin 4.50 3.50 - 5.20 g/dL    ALT (SGPT) 20 1 - 41 U/L    AST (SGOT) 28 1 - 40 U/L    Alkaline Phosphatase 67 39 - 117 U/L    Total Bilirubin 0.5 0.0 - 1.2 mg/dL    Globulin 2.1 gm/dL    A/G Ratio 2.1 g/dL    BUN/Creatinine Ratio 7.8 7.0 - 25.0    Anion Gap 10.9 5.0 - 15.0 mmol/L    eGFR 61.7 >60.0 mL/min/1.73   Results for orders placed or performed in visit on 09/02/22   PSA Diagnostic    Specimen: Blood   Result Value Ref Range    PSA 1.240 0.000 - 4.000 ng/mL   Results for orders placed or  performed in visit on 07/25/22   POCT SARS-CoV-2 Antigen ESTELLA + Flu    Specimen: Swab   Result Value Ref Range    SARS Antigen Not Detected Not Detected, Presumptive Negative    Influenza A Antigen ESTELLA Not Detected Not Detected    Influenza B Antigen ESTELLA Not Detected Not Detected    Internal Control Passed Passed    Lot Number [id number]     Expiration Date [id number],[id number]    COVID-19, [facility] IN-HOUSE, NP SWAB IN TRANSPORT MEDIA 8-10 HR TAT - Swab, Anterior nasal    Specimen: Anterior nasal; Swab   Result Value Ref Range    COVID19 Not Detected Not Detected - Ref. Range   Results for orders placed or performed in visit on 06/15/22   CBC Auto Differential    Specimen: Blood   Result Value Ref Range    WBC 5.20 3.40 - 10.80 10*3/mm3    RBC 4.83 4.14 - 5.80 10*6/mm3    Hemoglobin 14.4 13.0 - 17.7 g/dL    Hematocrit 42.9 37.5 - 51.0 %    MCV 88.8 79.0 - 97.0 fL    MCH 29.8 26.6 - 33.0 pg    MCHC 33.6 31.5 - 35.7 g/dL    RDW 12.5 12.3 - 15.4 %    RDW-SD 39.8 37.0 - 54.0 fl    MPV 10.7 6.0 - 12.0 fL    Platelets 179 140 - 450 10*3/mm3    Neutrophil % 63.8 42.7 - 76.0 %    Lymphocyte % 27.1 19.6 - 45.3 %    Monocyte % 7.7 5.0 - 12.0 %    Eosinophil % 0.6 0.3 - 6.2 %    Basophil % 0.4 0.0 - 1.5 %    Immature Grans % 0.4 0.0 - 0.5 %    Neutrophils, Absolute 3.32 1.70 - 7.00 10*3/mm3    Lymphocytes, Absolute 1.41 0.70 - 3.10 10*3/mm3    Monocytes, Absolute 0.40 0.10 - 0.90 10*3/mm3    Eosinophils, Absolute 0.03 0.00 - 0.40 10*3/mm3    Basophils, Absolute 0.02 0.00 - 0.20 10*3/mm3    Immature Grans, Absolute 0.02 0.00 - 0.05 10*3/mm3    nRBC 0.2 0.0 - 0.2 /100 WBC   Vitamin D 25 Hydroxy    Specimen: Blood   Result Value Ref Range    25 Hydroxy, Vitamin D 51.2 30.0 - 100.0 ng/ml   Hemoglobin A1c    Specimen: Blood   Result Value Ref Range    Hemoglobin A1C 6.20 (H) 4.80 - 5.60 %   Vitamin B12    Specimen: Blood   Result Value Ref Range    Vitamin B-12 544 211 - 946 pg/mL   Lipid Panel    Specimen: Blood   Result Value Ref Range     Total Cholesterol 121 0 - 200 mg/dL    Triglycerides 54 0 - 150 mg/dL    HDL Cholesterol 57 40 - 60 mg/dL    LDL Cholesterol  52 0 - 100 mg/dL    VLDL Cholesterol 12 5 - 40 mg/dL    LDL/HDL Ratio 0.93    Comprehensive Metabolic Panel    Specimen: Blood   Result Value Ref Range    Glucose 98 65 - 99 mg/dL    BUN 13 8 - 23 mg/dL    Creatinine 1.07 0.76 - 1.27 mg/dL    Sodium 140 136 - 145 mmol/L    Potassium 4.0 3.5 - 5.2 mmol/L    Chloride 104 98 - 107 mmol/L    CO2 24.8 22.0 - 29.0 mmol/L    Calcium 9.3 8.6 - 10.5 mg/dL    Total Protein 6.9 6.0 - 8.5 g/dL    Albumin 4.60 3.50 - 5.20 g/dL    ALT (SGPT) 18 1 - 41 U/L    AST (SGOT) 24 1 - 40 U/L    Alkaline Phosphatase 54 39 - 117 U/L    Total Bilirubin 0.7 0.0 - 1.2 mg/dL    Globulin 2.3 gm/dL    A/G Ratio 2.0 g/dL    BUN/Creatinine Ratio 12.1 7.0 - 25.0    Anion Gap 11.2 5.0 - 15.0 mmol/L    eGFR 76.5 >60.0 mL/min/1.73     *Note: Due to a large number of results and/or encounters for the requested time period, some results have not been displayed. A complete set of results can be found in Results Review.

## 2023-01-18 DIAGNOSIS — K21.9 GASTROESOPHAGEAL REFLUX DISEASE, UNSPECIFIED WHETHER ESOPHAGITIS PRESENT: ICD-10-CM

## 2023-01-18 RX ORDER — FAMOTIDINE 20 MG/1
20 TABLET, FILM COATED ORAL 2 TIMES DAILY
Qty: 180 TABLET | Refills: 0 | Status: SHIPPED | OUTPATIENT
Start: 2023-01-18 | End: 2023-03-25 | Stop reason: SDUPTHER

## 2023-01-18 RX ORDER — DILTIAZEM HYDROCHLORIDE 120 MG/1
120 CAPSULE, EXTENDED RELEASE ORAL DAILY
Qty: 30 CAPSULE | Refills: 3 | Status: SHIPPED | OUTPATIENT
Start: 2023-01-18

## 2023-01-18 RX ORDER — METFORMIN HYDROCHLORIDE 500 MG/1
500 TABLET, EXTENDED RELEASE ORAL
Qty: 30 TABLET | Refills: 3 | Status: SHIPPED | OUTPATIENT
Start: 2023-01-18

## 2023-01-18 RX ORDER — ATORVASTATIN CALCIUM 20 MG/1
20 TABLET, FILM COATED ORAL NIGHTLY
Qty: 90 TABLET | Refills: 0 | Status: SHIPPED | OUTPATIENT
Start: 2023-01-18 | End: 2023-03-19 | Stop reason: SDUPTHER

## 2023-01-20 ENCOUNTER — TRANSCRIBE ORDERS (OUTPATIENT)
Dept: LAB | Facility: HOSPITAL | Age: 68
End: 2023-01-20
Payer: MEDICARE

## 2023-01-20 DIAGNOSIS — Z12.5 ENCOUNTER FOR SCREENING FOR MALIGNANT NEOPLASM OF PROSTATE: ICD-10-CM

## 2023-01-20 DIAGNOSIS — R31.9 URINARY TRACT INFECTION WITH HEMATURIA, SITE UNSPECIFIED: Primary | ICD-10-CM

## 2023-01-20 DIAGNOSIS — N39.0 URINARY TRACT INFECTION WITH HEMATURIA, SITE UNSPECIFIED: Primary | ICD-10-CM

## 2023-01-23 ENCOUNTER — LAB (OUTPATIENT)
Dept: LAB | Facility: HOSPITAL | Age: 68
End: 2023-01-23
Payer: MEDICARE

## 2023-01-23 DIAGNOSIS — R31.9 URINARY TRACT INFECTION WITH HEMATURIA, SITE UNSPECIFIED: ICD-10-CM

## 2023-01-23 DIAGNOSIS — Z12.5 ENCOUNTER FOR SCREENING FOR MALIGNANT NEOPLASM OF PROSTATE: ICD-10-CM

## 2023-01-23 DIAGNOSIS — N39.0 URINARY TRACT INFECTION WITH HEMATURIA, SITE UNSPECIFIED: ICD-10-CM

## 2023-01-23 LAB
BILIRUB UR QL STRIP: NEGATIVE
CLARITY UR: CLEAR
COLOR UR: YELLOW
GLUCOSE UR STRIP-MCNC: NEGATIVE MG/DL
HGB UR QL STRIP.AUTO: NEGATIVE
KETONES UR QL STRIP: NEGATIVE
LEUKOCYTE ESTERASE UR QL STRIP.AUTO: NEGATIVE
NITRITE UR QL STRIP: NEGATIVE
PH UR STRIP.AUTO: 7 [PH] (ref 5–8)
PROT UR QL STRIP: NEGATIVE
PSA SERPL-MCNC: 1.67 NG/ML (ref 0–4)
SP GR UR STRIP: 1.01 (ref 1–1.03)
UROBILINOGEN UR QL STRIP: NORMAL

## 2023-01-23 PROCEDURE — G0103 PSA SCREENING: HCPCS

## 2023-01-23 PROCEDURE — 81003 URINALYSIS AUTO W/O SCOPE: CPT

## 2023-01-23 PROCEDURE — 84153 ASSAY OF PSA TOTAL: CPT

## 2023-02-10 RX ORDER — MAGNESIUM CARB/ALUMINUM HYDROX 105-160MG
150 TABLET,CHEWABLE ORAL AS NEEDED
COMMUNITY

## 2023-02-15 ENCOUNTER — HOSPITAL ENCOUNTER (OUTPATIENT)
Facility: HOSPITAL | Age: 68
Setting detail: HOSPITAL OUTPATIENT SURGERY
Discharge: HOME | End: 2023-02-15
Attending: INTERNAL MEDICINE | Admitting: INTERNAL MEDICINE
Payer: MEDICARE

## 2023-02-15 ENCOUNTER — ANESTHESIA EVENT (OUTPATIENT)
Dept: GASTROENTEROLOGY | Facility: HOSPITAL | Age: 68
End: 2023-02-15
Payer: MEDICARE

## 2023-02-15 ENCOUNTER — ANESTHESIA (OUTPATIENT)
Dept: GASTROENTEROLOGY | Facility: HOSPITAL | Age: 68
End: 2023-02-15
Payer: MEDICARE

## 2023-02-15 VITALS
HEART RATE: 85 BPM | RESPIRATION RATE: 20 BRPM | TEMPERATURE: 97 F | BODY MASS INDEX: 26.34 KG/M2 | SYSTOLIC BLOOD PRESSURE: 118 MMHG | WEIGHT: 184 LBS | HEIGHT: 70 IN | DIASTOLIC BLOOD PRESSURE: 74 MMHG | OXYGEN SATURATION: 94 %

## 2023-02-15 DIAGNOSIS — K21.00 GASTROESOPHAGEAL REFLUX DISEASE WITH ESOPHAGITIS WITHOUT HEMORRHAGE: ICD-10-CM

## 2023-02-15 DIAGNOSIS — R13.19 ESOPHAGEAL DYSPHAGIA: ICD-10-CM

## 2023-02-15 DIAGNOSIS — Z87.19 HISTORY OF ESOPHAGEAL STRICTURE: ICD-10-CM

## 2023-02-15 LAB — GLUCOSE BLDC GLUCOMTR-MCNC: 91 MG/DL (ref 70–130)

## 2023-02-15 PROCEDURE — 43248 EGD GUIDE WIRE INSERTION: CPT | Performed by: INTERNAL MEDICINE

## 2023-02-15 PROCEDURE — 25010000002 PROPOFOL 10 MG/ML EMULSION: Performed by: NURSE ANESTHETIST, CERTIFIED REGISTERED

## 2023-02-15 PROCEDURE — C1769 GUIDE WIRE: HCPCS | Performed by: INTERNAL MEDICINE

## 2023-02-15 PROCEDURE — 88305 TISSUE EXAM BY PATHOLOGIST: CPT

## 2023-02-15 PROCEDURE — 43239 EGD BIOPSY SINGLE/MULTIPLE: CPT | Performed by: INTERNAL MEDICINE

## 2023-02-15 PROCEDURE — 82962 GLUCOSE BLOOD TEST: CPT

## 2023-02-15 RX ORDER — PROMETHAZINE HYDROCHLORIDE 25 MG/1
25 SUPPOSITORY RECTAL ONCE AS NEEDED
Status: DISCONTINUED | OUTPATIENT
Start: 2023-02-15 | End: 2023-02-15 | Stop reason: HOSPADM

## 2023-02-15 RX ORDER — PROPOFOL 10 MG/ML
VIAL (ML) INTRAVENOUS AS NEEDED
Status: DISCONTINUED | OUTPATIENT
Start: 2023-02-15 | End: 2023-02-15 | Stop reason: SURG

## 2023-02-15 RX ORDER — ONDANSETRON 2 MG/ML
4 INJECTION INTRAMUSCULAR; INTRAVENOUS ONCE AS NEEDED
Status: DISCONTINUED | OUTPATIENT
Start: 2023-02-15 | End: 2023-02-15 | Stop reason: HOSPADM

## 2023-02-15 RX ORDER — LIDOCAINE HYDROCHLORIDE 20 MG/ML
INJECTION, SOLUTION INTRAVENOUS AS NEEDED
Status: DISCONTINUED | OUTPATIENT
Start: 2023-02-15 | End: 2023-02-15 | Stop reason: SURG

## 2023-02-15 RX ORDER — PROMETHAZINE HYDROCHLORIDE 25 MG/1
25 TABLET ORAL ONCE AS NEEDED
Status: DISCONTINUED | OUTPATIENT
Start: 2023-02-15 | End: 2023-02-15 | Stop reason: HOSPADM

## 2023-02-15 RX ORDER — DEXTROSE AND SODIUM CHLORIDE 5; .45 G/100ML; G/100ML
30 INJECTION, SOLUTION INTRAVENOUS CONTINUOUS PRN
Status: DISCONTINUED | OUTPATIENT
Start: 2023-02-15 | End: 2023-02-15 | Stop reason: HOSPADM

## 2023-02-15 RX ADMIN — DEXTROSE AND SODIUM CHLORIDE 30 ML/HR: 5; 450 INJECTION, SOLUTION INTRAVENOUS at 09:23

## 2023-02-15 RX ADMIN — PROPOFOL 100 MG: 10 INJECTION, EMULSION INTRAVENOUS at 10:40

## 2023-02-15 RX ADMIN — PROPOFOL 100 MG: 10 INJECTION, EMULSION INTRAVENOUS at 10:41

## 2023-02-15 RX ADMIN — LIDOCAINE HYDROCHLORIDE 100 MG: 20 INJECTION, SOLUTION INTRAVENOUS at 10:40

## 2023-02-15 NOTE — ANESTHESIA POSTPROCEDURE EVALUATION
Patient: Sergio Sandoval    Procedure Summary     Date: 02/15/23 Room / Location: [facility] ENDOSCOPY 1 / [facility] ENDOSCOPY    Anesthesia Start: 1037 Anesthesia Stop: 1048    Procedure: ESOPHAGOGASTRODUODENOSCOPY possible dilation Diagnosis:       Esophageal dysphagia      History of esophageal stricture      Gastroesophageal reflux disease with esophagitis without hemorrhage      (Esophageal dysphagia [R13.19])      (History of esophageal stricture [Z87.19])      (Gastroesophageal reflux disease with esophagitis without hemorrhage [K21.00])    Surgeons: Edmund Mckeon MD Provider: Max Delvalle CRNA    Anesthesia Type: general, MAC ASA Status: 3          Anesthesia Type: general, MAC    Vitals  No vitals data found for the desired time range.          Post Anesthesia Care and Evaluation    Patient location during evaluation: bedside  Patient participation: complete - patient cannot participate  Level of consciousness: responsive to light touch  Pain score: 0  Pain management: adequate    Airway patency: patent  Anesthetic complications: No anesthetic complications  PONV Status: none  Cardiovascular status: acceptable  Respiratory status: acceptable  Hydration status: acceptable

## 2023-02-15 NOTE — ANESTHESIA PREPROCEDURE EVALUATION
Anesthesia Evaluation     Patient summary reviewed and Nursing notes reviewed   NPO Solid Status: > 8 hours  NPO Liquid Status: > 8 hours           Airway   Mallampati: I  TM distance: >3 FB  Neck ROM: full  No difficulty expected  Dental - normal exam     Pulmonary - normal exam   (+) shortness of breath, recent URI resolved,   Cardiovascular   Exercise tolerance: excellent (>7 METS)    NYHA Classification: I  Rhythm: irregular  Rate: normal    (+) hypertension well controlled less than 2 medications, dysrhythmias Atrial Fib, hyperlipidemia,       Neuro/Psych- negative ROS  GI/Hepatic/Renal/Endo    (+)  GERD well controlled,  renal disease CRI, diabetes mellitus type 2 well controlled,     Musculoskeletal     (+) arthralgias, back pain,   Abdominal  - normal exam   Substance History - negative use     OB/GYN negative ob/gyn ROS         Other   arthritis,      ROS/Med Hx Other: Dry cough from URI                  Anesthesia Plan    ASA 3     general and MAC   total IV anesthesia  intravenous induction     Anesthetic plan, risks, benefits, and alternatives have been provided, discussed and informed consent has been obtained with: patient.  Pre-procedure education provided  Plan discussed with CRNA and medical student.        CODE STATUS:

## 2023-02-15 NOTE — H&P
No chief complaint on file.      Subjective    Sergio Sandoval is a 67 y.o. male. he is here today for follow-up.                                                                  Assessment & Plan                                     1. Esophageal dysphagia    2. History of esophageal stricture    3. Gastroesophageal reflux disease with esophagitis without hemorrhage      Plan; schedule patient for EGD with dilation if indicated due to dysphagia, history of esophageal stricture and uncontrolled reflux.  Continue Pepcid daily and Carafate as needed    Follow-up: No follow-ups on file.     HPI I agree with the current note with no changes in the history.    67-year-old male presents to discuss dysphagia history of esophageal stricture and chronic GERD.  [place] [facility] [place] he has to follow-up with [facility].  Reports reflux is always been well controlled with PPI but when he established with nephrology PPI was stopped and he was started on H2 blocker which does not control his symptoms very well.  PPI was discontinued about 2 years ago and about a year ago began having more frequent acid indigestion and dysphagia with more dry food and pills typically.  He denies any nausea vomiting or abdominal pain.  Reports bowel movements are regular somewhat constipated due to recent changes in medication  Reports colonoscopy was completed in 2017 or 2018 with repeat recommended in 10 years for surveillance  Review of Systems  Review of Systems   Constitutional: Positive for fatigue. Negative for activity change, appetite change, chills, diaphoresis, fever and unexpected weight change.   HENT: Positive for trouble swallowing (restarted about a year ago progressively worsened more with dry items and pills ).    Respiratory: Negative for cough and shortness of  "breath.    Gastrointestinal: Positive for constipation (on pain meds since back surgery ). Negative for abdominal distention, abdominal pain, anal bleeding, blood in stool, diarrhea, nausea, rectal pain and vomiting.       /81   Pulse 85   Temp 97.8 °F (36.6 °C)   Resp 18   Ht 177.8 cm (70\")   Wt 83.5 kg (184 lb)   SpO2 95%   BMI 26.40 kg/m²     Objective      Physical Exam  Constitutional:       General: He is not in acute distress.     Appearance: Normal appearance. He is normal weight. He is not ill-appearing.   HENT:      Head: Normocephalic and atraumatic.   Abdominal:      General: Abdomen is flat. Bowel sounds are normal. There is no distension.      Palpations: Abdomen is soft. There is no mass.      Tenderness: There is no abdominal tenderness.   Neurological:      Mental Status: He is alert.               The following portions of the patient's history were reviewed and updated as appropriate:   Past Medical History:   Diagnosis Date   • Atrial fibrillation (HCC)    • CKD (chronic kidney disease)    • DDD (degenerative disc disease), thoracic    • Diabetes mellitus (HCC)     prediabetes   • GERD (gastroesophageal reflux disease)    • Hyperlipidemia    • Hypertension    • Lipoma of skin      Past Surgical History:   Procedure Laterality Date   • BACK SURGERY      L4-L5 fusion   • CARDIAC ABLATION      x2   • COLONOSCOPY     • SHOULDER SURGERY Bilateral    • UPPER GASTROINTESTINAL ENDOSCOPY      with esophageal dilation   • WISDOM TOOTH EXTRACTION       Family History   Problem Relation Age of Onset   • Arthritis Mother    • Heart disease Mother    • Rheumatic fever Mother    • Hyperlipidemia Father    • Hypertension Father    • Diabetes Father    • Stroke Father    • Prostate cancer Father    • Skin cancer Father    • Cancer Sister    • Alcohol abuse Brother    • No Known Problems Daughter    • No Known Problems Son    • Prostate cancer Paternal Grandfather    • No Known Problems Sister  "       No Known Allergies  Social History     Socioeconomic History   • Marital status:    Tobacco Use   • Smoking status: Never   • Smokeless tobacco: Never   Vaping Use   • Vaping Use: Never used   Substance and Sexual Activity   • Alcohol use: Not Currently   • Drug use: Never   • Sexual activity: Defer     Current Medications:  Prior to Admission medications    Medication Sig Start Date End Date Taking? Authorizing Provider   Acetaminophen (TYLENOL 8 HOUR PO) Take 1,000 mg by mouth 3 (Three) Times a Day.   Yes Jorge Luis Thomas MD   apixaban (ELIQUIS) 5 MG tablet tablet Take 1 tablet by mouth Every 12 (Twelve) Hours. 8/5/22  Yes Oneil Jorgensen MD   atorvastatin (Lipitor) 20 MG tablet Take 1 tablet by mouth Every Night. 12/20/22  Yes Oneil Jorgensen MD   dilTIAZem XR (DILACOR XR) 120 MG 24 hr capsule Take 1 capsule by mouth Daily. 10/19/22  Yes Oneil Jorgensen MD   docusate sodium (Colace) 100 MG capsule Take 1 capsule by mouth 2 (Two) Times a Day As Needed for Constipation. 11/15/22  Yes Oneil Jorgensen MD   famotidine (Pepcid) 20 MG tablet Take 1 tablet by mouth 2 (Two) Times a Day. 12/20/22  Yes Oneil Jorgensen MD   fluticasone (FLONASE) 50 MCG/ACT nasal spray 2 sprays into the nostril(s) as directed by provider Daily.   Yes Jorge Luis Thomas MD   Glucosamine-Chondroit-Vit C-Mn (GLUCOSAMINE 1500 COMPLEX PO) Take 1,500 mg by mouth 2 (two) times a day.   Yes Jorge Luis Thomas MD   HYDROcodone-acetaminophen (NORCO)  MG per tablet Take 1 tablet by mouth Every 4 (Four) Hours As Needed. for pain 12/29/22  Yes Jorge Luis Thomas MD   metFORMIN ER (GLUCOPHAGE-XR) 500 MG 24 hr tablet Take 1 tablet by mouth Daily With Breakfast. 10/19/22  Yes Oneil Jorgensen MD   methocarbamol (ROBAXIN) 750 MG tablet Take 750 mg by mouth Every 8 (Eight) Hours As Needed. 12/29/22  Yes Jorge Luis Thomas MD   Multiple Vitamins-Minerals (MULTIVITAMIN WITH MINERALS) tablet tablet Take 1 tablet by mouth  Daily. GETS OTC   Yes Jorge Luis Thomas MD   Saw Palmetto 450 MG capsule Take  by mouth 2 (two) times a day. GETS OTC   Yes Jorge Luis Thomas MD   sucralfate (Carafate) 1 GM/10ML suspension Take 10 mL by mouth 4 (Four) Times a Day. 12/20/22  Yes Oneil Jorgensen MD   tamsulosin (Flomax) 0.4 MG capsule 24 hr capsule Take 1 capsule by mouth Daily. 10/19/22  Yes Oneil Jorgensen MD   telmisartan (Micardis) 20 MG tablet Take 1 tablet by mouth Daily. 11/3/22  Yes Oneil Jorgensen MD   VITAMIN D, ERGOCALCIFEROL, PO Take  by mouth. GETS OTC   Yes ProviderJorge Luis MD     Orders placed during this encounter include:  Orders Placed This Encounter   Procedures   • Implement Anesthesia Orders Day of Procedure     Standing Status:   Standing     Number of Occurrences:   1   • Obtain Informed Consent     Standing Status:   Standing     Number of Occurrences:   1     Order Specific Question:   Informed Consent Given For     Answer:   ESOPHAGOGASTRODUODENOSCOPY   • Pulse Oximetry, Continuous     Standing Status:   Standing     Number of Occurrences:   1   • Vital signs every 5 minutes for 15 minutes, every 15 minutes thereafter.     Standing Status:   Standing     Number of Occurrences:   1   • Notify Anesthesia of Any Acute Changes in Patient Condition     Standing Status:   Standing     Number of Occurrences:   1     Order Specific Question:   Other     Answer:   Any Acute Changes in Patient Condition   • Notify Anesthesia for Unrelieved Pain     Standing Status:   Standing     Number of Occurrences:   1     Order Specific Question:   Other     Answer:   Unrelieved Pain   • Once DC criteria to floor met, follow surgeon's orders.     Standing Status:   Standing     Number of Occurrences:   1   • Discharge patient from PACU when discharge criteria is met.     Standing Status:   Standing     Number of Occurrences:   1   • Oxygen Therapy- Blow by - Humidified; Titrate for SPO2: equal to or greater than, 96%, per policy      Standing Status:   Standing     Number of Occurrences:   1     Order Specific Question:   Device     Answer:   Blow by - Humidified     Order Specific Question:   Titrate for SPO2     Answer:   equal to or greater than     Order Specific Question:   Titrate for SPO2     Answer:   96%     Order Specific Question:   Titrate for SPO2     Answer:   per policy     Order Specific Question:   Indications for Oxygen Therapy     Answer:   Immediate post-op period   • POC Glucose Once     Prior to Procedure on ALL Diabetic Patients     Standing Status:   Standing     Number of Occurrences:   1     Order Specific Question:   Release to patient     Answer:   Routine Release   • POC Glucose Once     Standing Status:   Standing     Number of Occurrences:   1     Order Specific Question:   Release to patient     Answer:   Routine Release   • Insert Peripheral IV     Standing Status:   Standing     Number of Occurrences:   1     ESOPHAGOGASTRODUODENOSCOPY possible dilation (N/A)  New Medications Ordered This Visit   Medications   • dextrose 5 % and sodium chloride 0.45 % infusion   • ondansetron (ZOFRAN) injection 4 mg   • OR Linked Order Group    • promethazine (PHENERGAN) suppository 25 mg    • promethazine (PHENERGAN) tablet 25 mg         Review and/or summary of lab tests, radiology, procedures, medications. Review and summary of old records and obtaining of history. The risks and benefits of my recommendations, as well as other treatment options were discussed . Any questions/concerned were answered. Patient voiced understanding and agreement.          This document has been electronically signed by Edmund Mckeon MD on February 15, 2023 10:01 CST                                               Results for orders placed or performed during the hospital encounter of 02/15/23   POC Glucose Once    Specimen: Blood   Result Value Ref Range    Glucose 91 70 - 130 mg/dL   Results for orders placed or performed in visit on  01/23/23   PSA Screen    Specimen: Blood   Result Value Ref Range    PSA 1.670 0.000 - 4.000 ng/mL   Urinalysis With Microscopic If Indicated (No Culture) - Urine, Clean Catch    Specimen: Urine, Clean Catch   Result Value Ref Range    Color, UA Yellow Yellow, Straw    Appearance, UA Clear Clear    pH, UA 7.0 5.0 - 8.0    Specific Gravity, UA 1.011 1.005 - 1.030    Glucose, UA Negative Negative    Ketones, UA Negative Negative    Bilirubin, UA Negative Negative    Blood, UA Negative Negative    Protein, UA Negative Negative    Leuk Esterase, UA Negative Negative    Nitrite, UA Negative Negative    Urobilinogen, UA 0.2 E.U./dL 0.2 - 1.0 E.U./dL   Results for orders placed or performed in visit on 12/01/22   ECG 12 Lead   Result Value Ref Range    QT Interval 360 ms    QTC Interval 418 ms   Results for orders placed or performed in visit on 11/01/22   CBC Auto Differential    Specimen: Blood   Result Value Ref Range    WBC 5.99 3.40 - 10.80 10*3/mm3    RBC 4.30 4.14 - 5.80 10*6/mm3    Hemoglobin 13.3 13.0 - 17.7 g/dL    Hematocrit 39.1 37.5 - 51.0 %    MCV 90.9 79.0 - 97.0 fL    MCH 30.9 26.6 - 33.0 pg    MCHC 34.0 31.5 - 35.7 g/dL    RDW 12.0 (L) 12.3 - 15.4 %    RDW-SD 40.2 37.0 - 54.0 fl    MPV 10.3 6.0 - 12.0 fL    Platelets 204 140 - 450 10*3/mm3    Neutrophil % 66.3 42.7 - 76.0 %    Lymphocyte % 24.0 19.6 - 45.3 %    Monocyte % 8.3 5.0 - 12.0 %    Eosinophil % 0.8 0.3 - 6.2 %    Basophil % 0.3 0.0 - 1.5 %    Immature Grans % 0.3 0.0 - 0.5 %    Neutrophils, Absolute 3.96 1.70 - 7.00 10*3/mm3    Lymphocytes, Absolute 1.44 0.70 - 3.10 10*3/mm3    Monocytes, Absolute 0.50 0.10 - 0.90 10*3/mm3    Eosinophils, Absolute 0.05 0.00 - 0.40 10*3/mm3    Basophils, Absolute 0.02 0.00 - 0.20 10*3/mm3    Immature Grans, Absolute 0.02 0.00 - 0.05 10*3/mm3    nRBC 0.0 0.0 - 0.2 /100 WBC   Vitamin D 25 Hydroxy    Specimen: Blood   Result Value Ref Range    25 Hydroxy, Vitamin D 51.5 30.0 - 100.0 ng/ml   Hemoglobin A1c     Specimen: Blood   Result Value Ref Range    Hemoglobin A1C 6.00 (H) 4.80 - 5.60 %   Vitamin B12    Specimen: Blood   Result Value Ref Range    Vitamin B-12 565 211 - 946 pg/mL   Lipid Panel    Specimen: Blood   Result Value Ref Range    Total Cholesterol 131 0 - 200 mg/dL    Triglycerides 41 0 - 150 mg/dL    HDL Cholesterol 67 (H) 40 - 60 mg/dL    LDL Cholesterol  54 0 - 100 mg/dL    VLDL Cholesterol 10 5 - 40 mg/dL    LDL/HDL Ratio 0.83    Comprehensive Metabolic Panel    Specimen: Blood   Result Value Ref Range    Glucose 106 (H) 65 - 99 mg/dL    BUN 10 8 - 23 mg/dL    Creatinine 1.28 (H) 0.76 - 1.27 mg/dL    Sodium 142 136 - 145 mmol/L    Potassium 4.2 3.5 - 5.2 mmol/L    Chloride 106 98 - 107 mmol/L    CO2 25.1 22.0 - 29.0 mmol/L    Calcium 9.8 8.6 - 10.5 mg/dL    Total Protein 6.6 6.0 - 8.5 g/dL    Albumin 4.50 3.50 - 5.20 g/dL    ALT (SGPT) 20 1 - 41 U/L    AST (SGOT) 28 1 - 40 U/L    Alkaline Phosphatase 67 39 - 117 U/L    Total Bilirubin 0.5 0.0 - 1.2 mg/dL    Globulin 2.1 gm/dL    A/G Ratio 2.1 g/dL    BUN/Creatinine Ratio 7.8 7.0 - 25.0    Anion Gap 10.9 5.0 - 15.0 mmol/L    eGFR 61.7 >60.0 mL/min/1.73   Results for orders placed or performed in visit on 09/02/22   PSA Diagnostic    Specimen: Blood   Result Value Ref Range    PSA 1.240 0.000 - 4.000 ng/mL   Results for orders placed or performed in visit on 07/25/22   POCT SARS-CoV-2 Antigen ESTELLA + Flu    Specimen: Swab   Result Value Ref Range    SARS Antigen Not Detected Not Detected, Presumptive Negative    Influenza A Antigen ESTELLA Not Detected Not Detected    Influenza B Antigen ESTELLA Not Detected Not Detected    Internal Control Passed Passed    Lot Number [id number]     Expiration Date 3,112,023    COVID-19, [facility] IN-HOUSE, NP SWAB IN TRANSPORT MEDIA 8-10 HR TAT - Swab, Anterior nasal    Specimen: Anterior nasal; Swab   Result Value Ref Range    COVID19 Not Detected Not Detected - Ref. Range   Results for orders placed or performed in visit on 06/15/22    CBC Auto Differential    Specimen: Blood   Result Value Ref Range    WBC 5.20 3.40 - 10.80 10*3/mm3    RBC 4.83 4.14 - 5.80 10*6/mm3    Hemoglobin 14.4 13.0 - 17.7 g/dL    Hematocrit 42.9 37.5 - 51.0 %    MCV 88.8 79.0 - 97.0 fL    MCH 29.8 26.6 - 33.0 pg    MCHC 33.6 31.5 - 35.7 g/dL    RDW 12.5 12.3 - 15.4 %    RDW-SD 39.8 37.0 - 54.0 fl    MPV 10.7 6.0 - 12.0 fL    Platelets 179 140 - 450 10*3/mm3    Neutrophil % 63.8 42.7 - 76.0 %    Lymphocyte % 27.1 19.6 - 45.3 %    Monocyte % 7.7 5.0 - 12.0 %    Eosinophil % 0.6 0.3 - 6.2 %    Basophil % 0.4 0.0 - 1.5 %    Immature Grans % 0.4 0.0 - 0.5 %    Neutrophils, Absolute 3.32 1.70 - 7.00 10*3/mm3    Lymphocytes, Absolute 1.41 0.70 - 3.10 10*3/mm3    Monocytes, Absolute 0.40 0.10 - 0.90 10*3/mm3    Eosinophils, Absolute 0.03 0.00 - 0.40 10*3/mm3    Basophils, Absolute 0.02 0.00 - 0.20 10*3/mm3    Immature Grans, Absolute 0.02 0.00 - 0.05 10*3/mm3    nRBC 0.2 0.0 - 0.2 /100 WBC     *Note: Due to a large number of results and/or encounters for the requested time period, some results have not been displayed. A complete set of results can be found in Results Review.

## 2023-02-16 LAB — REF LAB TEST METHOD: NORMAL

## 2023-02-19 PROBLEM — K29.70 GASTRITIS WITHOUT BLEEDING: Status: ACTIVE | Noted: 2023-02-19

## 2023-02-19 PROBLEM — Z98.890 HISTORY OF BACK SURGERY: Status: ACTIVE | Noted: 2023-02-19

## 2023-02-19 PROBLEM — K22.2 ESOPHAGEAL STENOSIS: Status: ACTIVE | Noted: 2023-02-19

## 2023-02-21 NOTE — PROGRESS NOTES
Subjective:  Sergio Sandoval is a 67 y.o. male who presents for       Patient Active Problem List   Diagnosis   • Mixed hyperlipidemia   • Essential hypertension   • Vitamin D deficiency   • Gastroesophageal reflux disease   • Benign prostatic hyperplasia with lower urinary tract symptoms   • Benign prostatic hyperplasia with weak urinary stream   • Prediabetes   • Dizziness   • Thrombocytopenia (HCC)   • Atrial fibrillation, controlled (HCC)   • Dyspnea on exertion   • History of thrombocytopenia   • History of prediabetes   • Atrial fibrillation, persistent (HCC)   • Need for pneumococcal vaccine   • Multiple joint pain   • Chronic left shoulder pain   • Chronic midline low back pain   • Pain of left scapula   • Left shoulder tendonitis   • Spinal stenosis of lumbar region   • DDD (degenerative disc disease), lumbar   • Preoperative cardiovascular examination   • Stage 2 chronic kidney disease   • Overweight   • Esophageal dysphagia   • History of esophageal stricture   • History of back surgery   • Gastritis without bleeding   • Esophageal stenosis           Current Outpatient Medications:   •  Acetaminophen (TYLENOL 8 HOUR PO), Take 1,000 mg by mouth 3 (Three) Times a Day As Needed., Disp: , Rfl:   •  apixaban (ELIQUIS) 5 MG tablet tablet, Take 1 tablet by mouth Every 12 (Twelve) Hours., Disp: 180 tablet, Rfl: 3  •  atorvastatin (Lipitor) 20 MG tablet, Take 1 tablet by mouth Every Night., Disp: 90 tablet, Rfl: 0  •  dilTIAZem XR (DILACOR XR) 120 MG 24 hr capsule, Take 1 capsule by mouth Daily., Disp: 30 capsule, Rfl: 3  •  docusate sodium (Colace) 100 MG capsule, Take 1 capsule by mouth 2 (Two) Times a Day As Needed for Constipation., Disp: 60 capsule, Rfl: 3  •  famotidine (Pepcid) 20 MG tablet, Take 1 tablet by mouth 2 (Two) Times a Day., Disp: 180 tablet, Rfl: 0  •  fluticasone (FLONASE) 50 MCG/ACT nasal spray, 2 sprays into the nostril(s) as directed by provider Daily As Needed., Disp: , Rfl:   •   Glucosamine-Chondroit-Vit C-Mn (GLUCOSAMINE 1500 COMPLEX PO), Take 1,500 mg by mouth 2 (two) times a day., Disp: , Rfl:   •  magnesium citrate 1.745 GM/30ML solution solution, Take 150 mL by mouth As Needed., Disp: , Rfl:   •  metFORMIN ER (GLUCOPHAGE-XR) 500 MG 24 hr tablet, Take 1 tablet by mouth Daily With Breakfast., Disp: 30 tablet, Rfl: 3  •  methocarbamol (ROBAXIN) 750 MG tablet, Take 750 mg by mouth Every 8 (Eight) Hours As Needed., Disp: , Rfl:   •  Multiple Vitamins-Minerals (MULTIVITAMIN WITH MINERALS) tablet tablet, Take 1 tablet by mouth Daily. GETS OTC, Disp: , Rfl:   •  Saw Palmetto 450 MG capsule, Take 1 capsule by mouth 2 (two) times a day. GETS OTC, Disp: , Rfl:   •  sucralfate (Carafate) 1 GM/10ML suspension, Take 10 mL by mouth 4 (Four) Times a Day., Disp: 414 mL, Rfl: 3  •  tamsulosin (Flomax) 0.4 MG capsule 24 hr capsule, Take 1 capsule by mouth Daily., Disp: 90 capsule, Rfl: 1  •  telmisartan (Micardis) 20 MG tablet, Take 1 tablet by mouth Daily., Disp: 30 tablet, Rfl: 3  •  VITAMIN D, ERGOCALCIFEROL, PO, Take 1 capsule by mouth Daily. GETS OTC, Disp: , Rfl:     Pt is 68 yo male with management of, GERD with esophagitis, gastritis, history of esophageal stenosis , HTN, HLP,  CAD, sp bilateral shoulder surgery, SVT, history of prediabetes, thrombocytopenia, atrial fibrillation sp ablation x 2 , CKD stage 2. History of COVID-19 infection, chronic left shoulder pain. Chronic lower back pain (DDD lumbar spine, mild spinal stenosis)     11/3/22 in office visit for recheck. Pt's medication for Afib was switched from toprol XLto dilacor  mg daily.  Pt has history of persistent a fib and several cardiac ablations. I consulted his Cardiologsit Dr. Quick regarding this. Pt had labwork on 11/1/22 that showed lipid penal  HDL at 67. hga1c at 6.00. CMP showed CR At 1.28 with gFR at 61.7 from 76.5. CBC Showed stable hemoglobin and WBC. Pt states his HR  Is better and running in 70s. No chest pain no  dizziness. BP is on lower side today. At home it has it is stable.  He also saw Dr. Crawford and has pending surgery on his lower back in Januar 2023.      12/20/22 in office visit for recheck. Pt saw Orthopedic on 11/29/22 for his rigth shoulder pain and was given injection in right shoulder. He also saw Cardiologist on 12/1/22 for his persistent atrial fibrillation and is to continue dilacor  mg daily along with eliquis he was considered low to moderate risk for pending surgeyr of his neck and he is to stop eliquis 72 hours prior to surgery. BP and HR stasble today.  He is having issues with his GERD and is taking pepcid 20 mg PO BID. His last EGD was 2 years ago in [facility] and was told he had esophagitis and had dilation of esophageal stricture. He has trouble swallowing.     2/25/23 in office visit for recheck. Since last visit pt has had spine surgery performed by Dr. Crawford in [place]. Pt had EGD on 2/15/23 and was found to have benign intrinsic stenosis with dilation, gastritis and esophagitis. Tissue pathology showed reactive gastropathy. Pt saw Urology and had labwork on 1/23/23 with normal UA and PSA. BP and HR is stable today.  Pt is feeling better in regards to his lower back. He has less pain. He is doing home exercise.  He will see Dr. Crawford next  months.  He is only taking tylenol for pain.  His swallowing is better. His heart is stable. No chest pain no dizziness.           Heartburn  He complains of abdominal pain and coughing. He reports no belching, no chest pain, no choking, no dysphagia, no early satiety, no globus sensation, no heartburn, no hoarse voice, no nausea, no sore throat, no stridor, no tooth decay, no water brash or no wheezing. This is a recurrent problem. The current episode started more than 1 month ago. The problem occurs constantly. Nothing aggravates the symptoms. Associated symptoms include fatigue. He has tried an antacid for the symptoms. The treatment provided no  relief. Past procedures do not include an abdominal ultrasound, an EGD, esophageal manometry, H. pylori antibody titer or a UGI. Past invasive treatments do not include gastroplasty, gastroplication or reflux surgery.   Atrial Fibrillation  Presents for follow-up visit. Symptoms include weakness. Symptoms are negative for an AICD problem, bradycardia, chest pain, dizziness, hypertension, hypotension, pacemaker problem, palpitations, shortness of breath, syncope and tachycardia. The symptoms have been stable. Past medical history includes atrial fibrillation.   Back Pain  This is a chronic problem. The current episode started more than 1 month ago. The problem occurs rarely The pain is present in the gluteal and lumbar spine. The quality of the pain is described as aching. The pain is at a severity of 2/10. The symptoms are aggravated by bending and position. Stiffness is present all day. Associated symptoms include dysuria, numbness and weakness. Pertinent negatives include no abdominal pain, bladder incontinence, bowel incontinence, chest pain, fever, headaches, leg pain, paresis, paresthesias, pelvic pain, perianal numbness, tingling or weight loss. He has tried NSAIDs (neurontin cymbalta,back surgery  ) for the symptoms. The treatment provided significant  relief.   Shoulder Injury   The incident occurred at home. The left shoulder is affected. The incident occurred more than 1 week ago. There was no injury mechanism. The quality of the pain is described as shooting. Associated symptoms include numbness. Pertinent negatives include no chest pain or tingling. The symptoms are aggravated by movement, overhead lifting and palpation. Treatments tried: neurontin. The treatment provided no relief.   Chronic Kidney Disease  This is a chronic problem. The current episode started more than 1 year ago. The problem occurs constantly. The problem has been unchanged. Associated symptoms include arthralgias. Pertinent  negatives include no abdominal pain, anorexia, change in bowel habit, chest pain, chills, congestion, coughing, fatigue, fever, headaches, joint swelling, myalgias, nausea, neck pain, numbness, sore throat, swollen glands, urinary symptoms, vertigo, visual change, vomiting or weakness. Nothing aggravates the symptoms. He has tried nothing for the symptoms. The treatment provided no relief.   Heart Problem  This is a chronic problem. The current episode started more than 1 year ago. The problem occurs constantly. The problem has been unchanged. Associated symptoms include arthralgias and fatigue. Pertinent negatives include no abdominal pain, anorexia, change in bowel habit, chest pain, chills, congestion, coughing, diaphoresis, fever, headaches, joint swelling, myalgias, nausea, neck pain, numbness, sore throat, swollen glands, urinary symptoms, vertigo, visual change, vomiting or weakness. The symptoms are aggravated by bending. Treatments tried: flecanaide  The treatment provided significant relief.   Hypertension   This is a chronic problem. The current episode started more than 1 month ago. The problem is unchanged. The problem is uncontrolled. Pertinent negatives include no anxiety, blurred vision, chest pain, headaches, malaise/fatigue, neck pain, orthopnea, palpitations, peripheral edema, PND, shortness of breath or sweats. Risk factors for coronary artery disease include male gender and dyslipidemia. Past treatments include beta blockers, angiotensin blockers and diuretics. Current antihypertension treatment includes beta blockers, angiotensin blockers and diuretics. The current treatment provides moderate improvement. There are no compliance problems.  There is no history of angina, kidney disease, CAD/MI, CVA, heart failure, left ventricular hypertrophy, PVD or retinopathy. There is no history of chronic renal disease, coarctation of the aorta, hyperaldosteronism, hypercortisolism, hyperparathyroidism, a  hypertension causing med, pheochromocytoma, renovascular disease, sleep apnea or a thyroid problem.   Hyperlipidemia   This is a chronic problem. The current episode started more than 1 year ago. He has no history of chronic renal disease. Pertinent negatives include no chest pain or shortness of breath. The current treatment provides mild improvement of lipids. There are no compliance problems.     Male  Problem   The patient's pertinent negatives include no genital injury, genital itching, genital lesions, pelvic pain, penile discharge, penile pain, priapism, scrotal swelling or testicular pain. Primary symptoms comment: difficulty urinating. This is a new problem. The problem occurs constantly. The problem has been improving  Pertinent negatives include no abdominal pain, anorexia, chest pain, chills, constipation, coughing, diarrhea, discolored urine, dysuria, fever, flank pain, frequency, headaches, hematuria, hesitancy, joint pain, joint swelling, nausea, painful intercourse, rash, shortness of breath, urgency, urinary retention or vomiting. Nothing aggravates the symptoms. He has tried nothing for the symptoms. The treatment provided no relief. He is sexually active. There is no history of chlamydia, cryptorchidism, erectile aid use, erectile dysfunction, a femoral hernia, gonorrhea, herpes simplex, HIV, an inguinal hernia, kidney stones, prostatitis, sickle cell disease, syphilis or varicocele. Has tried flomax and had significant relief     Review of Systems  Review of Systems   Constitutional: Positive for activity change and fatigue. Negative for appetite change, chills, diaphoresis and fever.   HENT: Negative for congestion, hoarse voice, postnasal drip, rhinorrhea, sinus pressure, sinus pain, sneezing, sore throat, trouble swallowing and voice change.    Respiratory: Positive for cough. Negative for choking, chest tightness, shortness of breath, wheezing and stridor.    Cardiovascular: Negative for  chest pain.   Gastrointestinal: Positive for abdominal pain. Negative for diarrhea, dysphagia, heartburn, nausea and vomiting.   Musculoskeletal: Positive for arthralgias.   Neurological: Positive for weakness and numbness. Negative for headaches.   Hematological: Positive for adenopathy.       Patient Active Problem List   Diagnosis   • Mixed hyperlipidemia   • Essential hypertension   • Vitamin D deficiency   • Gastroesophageal reflux disease   • Benign prostatic hyperplasia with lower urinary tract symptoms   • Benign prostatic hyperplasia with weak urinary stream   • Prediabetes   • Dizziness   • Thrombocytopenia (HCC)   • Atrial fibrillation, controlled (HCC)   • Dyspnea on exertion   • History of thrombocytopenia   • History of prediabetes   • Atrial fibrillation, persistent (HCC)   • Need for pneumococcal vaccine   • Multiple joint pain   • Chronic left shoulder pain   • Chronic midline low back pain   • Pain of left scapula   • Left shoulder tendonitis   • Spinal stenosis of lumbar region   • DDD (degenerative disc disease), lumbar   • Preoperative cardiovascular examination   • Stage 2 chronic kidney disease   • Overweight   • Esophageal dysphagia   • History of esophageal stricture   • History of back surgery   • Gastritis without bleeding   • Esophageal stenosis     Past Surgical History:   Procedure Laterality Date   • BACK SURGERY      L4-L5 fusion   • CARDIAC ABLATION      x2   • COLONOSCOPY     • ENDOSCOPY     • SHOULDER SURGERY Bilateral    • UPPER GASTROINTESTINAL ENDOSCOPY      with esophageal dilation   • WISDOM TOOTH EXTRACTION       Social History     Socioeconomic History   • Marital status:    Tobacco Use   • Smoking status: Never   • Smokeless tobacco: Never   Vaping Use   • Vaping Use: Never used   Substance and Sexual Activity   • Alcohol use: Not Currently   • Drug use: Never   • Sexual activity: Defer     Family History   Problem Relation Age of Onset   • Arthritis Mother    •  Heart disease Mother    • Rheumatic fever Mother    • Hyperlipidemia Father    • Hypertension Father    • Diabetes Father    • Stroke Father    • Prostate cancer Father    • Skin cancer Father    • Cancer Sister    • Alcohol abuse Brother    • No Known Problems Daughter    • No Known Problems Son    • Prostate cancer Paternal Grandfather    • No Known Problems Sister      Admission on 02/15/2023, Discharged on 02/15/2023   Component Date Value Ref Range Status   • Glucose 02/15/2023 91  70 - 130 mg/dL Final    : [id number] HUDSON ESQUEDAMeter ID: [id number]   • Reference Lab Report 02/15/2023    Final                    Value:[facility]  [address] [place] [address]  Phone: [phone] or [phone]  Fax: [phone]  Murali Coreas M.D., Medical Director    PATIENT NAME                           LABORATORY NO.  [address].                   [id number]  [id number]                         AGE              SEX  N           CLIENT REF #  [facility]           67      1955      [id number]   [id number]    [place]                       REQUESTING M.D.     ATTENDING M.D.     COPY TO[address] [place] [address]             DATE COLLECTED      DATE RECEIVED      DATE REPORTED  02/15/2023          02/15/2023         2023    DIAGNOSIS:  A.   ANTRUM, BIOPSY:  Reactive gastropathy  No Helicobacter pylori like organisms identified on H&E stained slide  No intestinal metaplasia or dysplasia identified  B.   ESOPHAGUS, BIOPSY, DISTAL:  Squamous mucosa with mild                           reactive/reflux related changes  No increased intraepithelial eosinophils, intestinal metaplasia or  dysplasia identified    CLINICAL HISTORY:  Esophageal dysphagia, history of esophageal stricture, gastroesophageal reflux  disease with esophagitis without hemorrhage    SPECIMENS  RECEIVED:  A.  ANTRUM, BIOPSY  B.  ESOPHAGUS, BIOPSY, DISTAL    MICROSCOPIC DESCRIPTION:  Tissue blocks are prepared and slides are examined microscopically on all  specimens. See diagnosis for details.    Professional interpretation rendered by Emily Woods D.O., [email or web address]. at  [facility] [facility], [facility], [address] [place] [address].    GROSS DESCRIPTION:  A.  Labeled as antrum, consisting of 1 piece of tan soft tissue measuring 0.5 x  0.3 x 0.2 cm, submitted entirely in 1 cassette.  SOG  B.  Labeled as distal esophagus, consisting of 2 pieces of tan soft tissue  measuring 0.4 x 0.4 x 0.2 cm, submitted entirely in 1 cassette.    REVIEWED, DIAGNOSED AND ELECTRONICALLY  SIGNED BY:    Emily Woods D.O.,                           REJI.[email or web address].P.  CPT CODES:  88305x2     Lab on 01/23/2023   Component Date Value Ref Range Status   • PSA 01/23/2023 1.670  0.000 - 4.000 ng/mL Final   • Color, UA 01/23/2023 Yellow  Yellow, Straw Final   • Appearance, UA 01/23/2023 Clear  Clear Final   • pH, UA 01/23/2023 7.0  5.0 - 8.0 Final   • Specific Gravity, UA 01/23/2023 1.011  1.005 - 1.030 Final   • Glucose, UA 01/23/2023 Negative  Negative Final   • Ketones, UA 01/23/2023 Negative  Negative Final   • Bilirubin, UA 01/23/2023 Negative  Negative Final   • Blood, UA 01/23/2023 Negative  Negative Final   • Protein, UA 01/23/2023 Negative  Negative Final   • Leuk Esterase, UA 01/23/2023 Negative  Negative Final   • Nitrite, UA 01/23/2023 Negative  Negative Final   • Urobilinogen, UA 01/23/2023 0.2 E.U./dL  0.2 - 1.0 E.U./dL Final   Office Visit on 12/01/2022   Component Date Value Ref Range Status   • QT Interval 12/01/2022 360  ms Final   • QTC Interval 12/01/2022 418  ms Final   Lab on 11/01/2022   Component Date Value Ref Range Status   • WBC 11/01/2022 5.99  3.40 - 10.80 10*3/mm3 Final   • RBC 11/01/2022 4.30  4.14 - 5.80 10*6/mm3 Final   • Hemoglobin 11/01/2022 13.3  13.0 - 17.7 g/dL Final   • Hematocrit 11/01/2022 39.1  37.5 -  51.0 % Final   • MCV 11/01/2022 90.9  79.0 - 97.0 fL Final   • MCH 11/01/2022 30.9  26.6 - 33.0 pg Final   • MCHC 11/01/2022 34.0  31.5 - 35.7 g/dL Final   • RDW 11/01/2022 12.0 (L)  12.3 - 15.4 % Final   • RDW-SD 11/01/2022 40.2  37.0 - 54.0 fl Final   • MPV 11/01/2022 10.3  6.0 - 12.0 fL Final   • Platelets 11/01/2022 204  140 - 450 10*3/mm3 Final   • Neutrophil % 11/01/2022 66.3  42.7 - 76.0 % Final   • Lymphocyte % 11/01/2022 24.0  19.6 - 45.3 % Final   • Monocyte % 11/01/2022 8.3  5.0 - 12.0 % Final   • Eosinophil % 11/01/2022 0.8  0.3 - 6.2 % Final   • Basophil % 11/01/2022 0.3  0.0 - 1.5 % Final   • Immature Grans % 11/01/2022 0.3  0.0 - 0.5 % Final   • Neutrophils, Absolute 11/01/2022 3.96  1.70 - 7.00 10*3/mm3 Final   • Lymphocytes, Absolute 11/01/2022 1.44  0.70 - 3.10 10*3/mm3 Final   • Monocytes, Absolute 11/01/2022 0.50  0.10 - 0.90 10*3/mm3 Final   • Eosinophils, Absolute 11/01/2022 0.05  0.00 - 0.40 10*3/mm3 Final   • Basophils, Absolute 11/01/2022 0.02  0.00 - 0.20 10*3/mm3 Final   • Immature Grans, Absolute 11/01/2022 0.02  0.00 - 0.05 10*3/mm3 Final   • nRBC 11/01/2022 0.0  0.0 - 0.2 /100 WBC Final   • Glucose 11/01/2022 106 (H)  65 - 99 mg/dL Final   • BUN 11/01/2022 10  8 - 23 mg/dL Final   • Creatinine 11/01/2022 1.28 (H)  0.76 - 1.27 mg/dL Final   • Sodium 11/01/2022 142  136 - 145 mmol/L Final   • Potassium 11/01/2022 4.2  3.5 - 5.2 mmol/L Final   • Chloride 11/01/2022 106  98 - 107 mmol/L Final   • CO2 11/01/2022 25.1  22.0 - 29.0 mmol/L Final   • Calcium 11/01/2022 9.8  8.6 - 10.5 mg/dL Final   • Total Protein 11/01/2022 6.6  6.0 - 8.5 g/dL Final   • Albumin 11/01/2022 4.50  3.50 - 5.20 g/dL Final   • ALT (SGPT) 11/01/2022 20  1 - 41 U/L Final   • AST (SGOT) 11/01/2022 28  1 - 40 U/L Final   • Alkaline Phosphatase 11/01/2022 67  39 - 117 U/L Final   • Total Bilirubin 11/01/2022 0.5  0.0 - 1.2 mg/dL Final   • Globulin 11/01/2022 2.1  gm/dL Final   • A/G Ratio 11/01/2022 2.1  g/dL Final   •  BUN/Creatinine Ratio 11/01/2022 7.8  7.0 - 25.0 Final   • Anion Gap 11/01/2022 10.9  5.0 - 15.0 mmol/L Final   • eGFR 11/01/2022 61.7  >60.0 mL/min/1.73 Final    National Kidney Foundation and American Society of Nephrology (ASN) Task Force recommended calculation based on the Chronic Kidney Disease Epidemiology Collaboration (CKD-EPI) equation refit without adjustment for race.   • Hemoglobin A1C 11/01/2022 6.00 (H)  4.80 - 5.60 % Final   • Total Cholesterol 11/01/2022 131  0 - 200 mg/dL Final   • Triglycerides 11/01/2022 41  0 - 150 mg/dL Final   • HDL Cholesterol 11/01/2022 67 (H)  40 - 60 mg/dL Final   • LDL Cholesterol  11/01/2022 54  0 - 100 mg/dL Final   • VLDL Cholesterol 11/01/2022 10  5 - 40 mg/dL Final   • LDL/HDL Ratio 11/01/2022 0.83   Final   • 25 Hydroxy, Vitamin D 11/01/2022 51.5  30.0 - 100.0 ng/ml Final   • Vitamin B-12 11/01/2022 565  211 - 946 pg/mL Final   Lab on 09/02/2022   Component Date Value Ref Range Status   • PSA 09/02/2022 1.240  0.000 - 4.000 ng/mL Final      XR Shoulder 2+ View Right  Narrative: EXAM: XR SHOULDER 2 OR MORE VIEWS RIGHT    HISTORY: pain, M25.511 Pain in right shoulder.    COMPARISON: None    FINDINGS:    Mineralization: Osseous demineralization    Bones: Glenohumeral joint space narrowing. Small humeral  osteophytes. Small pseudocysts lateral humeral head. Prior  resection distal clavicle.    Soft tissues: Normal  Impression: Glenohumeral osteoarthritis    Electronically signed by:  Erick Patricio DO  11/29/2022 9:51 AM  CST Workstation: [id number]    @[employer]@  Immunization History   Administered Date(s) Administered   • COVID-19 (MODERNA) 1st, 2nd, 3rd Dose Only 01/06/2021, 02/03/2021   • FluLaval/Fluzone >6mos 09/15/2020   • Fluzone High-Dose 65+yrs 09/29/2021, 10/19/2022   • Pneumococcal Polysaccharide (PPSV23) 10/28/2021       The following portions of the patient's history were reviewed and updated as appropriate: allergies, current medications, past family  "history, past medical history, past social history, past surgical history and problem list.        Physical Exam   /80 (BP Location: Right arm, Patient Position: Sitting, Cuff Size: Adult)   Pulse 96   Temp 97.6 °F (36.4 °C)   Ht 177.8 cm (70\")   Wt 83.6 kg (184 lb 6.4 oz)   SpO2 99%   BMI 26.46 kg/m²       Physical Exam  Vitals and nursing note reviewed.   Constitutional:       Appearance: He is well-developed. He is not diaphoretic.   HENT:      Head: Normocephalic and atraumatic.      Right Ear: External ear normal.   Eyes:      Conjunctiva/sclera: Conjunctivae normal.      Pupils: Pupils are equal, round, and reactive to light.   Cardiovascular:      Rate and Rhythm: Normal rate. Rhythm irregular.      Heart sounds: Normal heart sounds. No murmur heard.  Pulmonary:      Effort: Pulmonary effort is normal. No respiratory distress.      Breath sounds: Normal breath sounds.   Abdominal:      General: Bowel sounds are normal. There is no distension.      Palpations: Abdomen is soft.      Tenderness: There is no abdominal tenderness.   Musculoskeletal:         General: Tenderness present. No deformity.      Cervical back: Normal range of motion and neck supple.      Lumbar back: Spasms, tenderness and bony tenderness present. Decreased range of motion.   Skin:     General: Skin is warm.      Coloration: Skin is not pale.      Findings: No erythema or rash.   Neurological:      Mental Status: He is alert and oriented to person, place, and time.      Cranial Nerves: No cranial nerve deficit.   Psychiatric:         Behavior: Behavior normal.         [unfilled]   Diagnosis Plan   1. Essential hypertension  CBC Auto Differential    Comprehensive Metabolic Panel    Hemoglobin A1c    Lipid Panel      2. Mixed hyperlipidemia  CBC Auto Differential    Comprehensive Metabolic Panel    Hemoglobin A1c    Lipid Panel      3. Atrial fibrillation, persistent (HCC)  CBC Auto Differential    Comprehensive Metabolic " Panel    Hemoglobin A1c    Lipid Panel      4. History of thrombocytopenia  CBC Auto Differential    Comprehensive Metabolic Panel    Hemoglobin A1c    Lipid Panel      5. Prediabetes  CBC Auto Differential    Comprehensive Metabolic Panel    Hemoglobin A1c    Lipid Panel      6. Vitamin D deficiency  CBC Auto Differential    Comprehensive Metabolic Panel    Hemoglobin A1c    Lipid Panel      7. Gastroesophageal reflux disease with esophagitis without hemorrhage  CBC Auto Differential    Comprehensive Metabolic Panel    Hemoglobin A1c    Lipid Panel      8. Stage 2 chronic kidney disease  CBC Auto Differential    Comprehensive Metabolic Panel    Hemoglobin A1c    Lipid Panel      9. History of back surgery  CBC Auto Differential    Comprehensive Metabolic Panel    Hemoglobin A1c    Lipid Panel      10. Overweight                    -recommend labwork   -Left shoulder pain/scapula pain -reviewed MRI of left shoulder Orthopedic following   -lower back pain/spondylosis of lumbar spine/lumbar stenosis /history of back surgery  - reviewed MRI of lumbar spine Pt failed PT/OT.Neurosurgery following and had surgery . Was on cymbalta and neurontin. Pt had recent surgery   -overweight -BMI at 26.46   -prediabetes - on metformin  mg daily   -CKD stage 2 -Nephrology following   -family history prostate cancer/difficulty urinating/BPH - recent PSA normal .recent US of prostate shows enlarged prostate . on flomax 0.4 m PO qhs. Will refer to Urology   -SVT/atrial fibrillation    on toprol XL XR 50 mg daily. On eliquis.   Cardiology following. Recent stress test was low risk study. HR elevated stopped  toprol XL 50 mg daily and switched to dilacor  mg daily. Drug information provided. Pt advised to make an appt with Dr. Qucik   -P - stop simvastatin  80 mg PO qhs recommend heart healthy diet start on lipitor 20 mg PO qhs   -HTN  - on micadis 40 PO q daily,  on dilacor  mg daily. Will cut back on micardis from 40  to 20 mg daily.  BP on lower side today. Advised pt to monitor blood pressure at home   -GERD/ with esophagitis/gastritis/history of esophageal stricture  -on pepcid 20 mg PO BID Pt had dilation. He is feeling better.    -vitamin D defiicency -vitamin D once a week   -advised pt to be safe and call with questions and concerns  -advised pt to go to ER or call 911 if symptoms worrisome or severe  -advised pt to followup with specialist and referrals  -advised pt to be safe during COVID-19 pandemic  I spent 30  minutes caring for Sergio on this date of service. This time includes time spent by me in the following activities: preparing for the visit, reviewing tests, obtaining and/or reviewing a separately obtained history, performing a medically appropriate examination and/or evaluation, counseling and educating the patient/family/caregiver, ordering medications, tests, or procedures, referring and communicating with other health care professionals, documenting information in the medical record, independently interpreting results and communicating that information with the patient/family/caregiver and care coordination  -recheck in 3  month          This document has been electronically signed by Oneil Jorgensen MD on February 24, 2023 15:31 CST

## 2023-02-23 ENCOUNTER — OFFICE VISIT (OUTPATIENT)
Dept: GASTROENTEROLOGY | Facility: CLINIC | Age: 68
End: 2023-02-23
Payer: MEDICARE

## 2023-02-23 VITALS
DIASTOLIC BLOOD PRESSURE: 96 MMHG | HEART RATE: 85 BPM | HEIGHT: 70 IN | SYSTOLIC BLOOD PRESSURE: 136 MMHG | WEIGHT: 185 LBS | BODY MASS INDEX: 26.48 KG/M2

## 2023-02-23 DIAGNOSIS — R13.19 ESOPHAGEAL DYSPHAGIA: ICD-10-CM

## 2023-02-23 DIAGNOSIS — K21.00 GASTROESOPHAGEAL REFLUX DISEASE WITH ESOPHAGITIS WITHOUT HEMORRHAGE: ICD-10-CM

## 2023-02-23 DIAGNOSIS — K22.2 STRICTURE AND STENOSIS OF ESOPHAGUS: Primary | ICD-10-CM

## 2023-02-23 PROCEDURE — 99213 OFFICE O/P EST LOW 20 MIN: CPT | Performed by: NURSE PRACTITIONER

## 2023-02-23 NOTE — PROGRESS NOTES
Chief Complaint   Patient presents with   • Endo f/u   • Difficulty Swallowing       Subjective    Sergio Sandoval is a 67 y.o. male. he is here today for follow-up.                                                                  Assessment & Plan                                     1. Stricture and stenosis of esophagus    2. Gastroesophageal reflux disease with esophagitis without hemorrhage    3. Esophageal dysphagia      Plan; recommend esophageal manometry study due to persistent symptoms patient declines to schedule.  Would like patient to be on PPI however per nephrology cannot take medication discussed with patient may need to stop Carafate due to renal function he has follow-up with nephrology next week to discuss further.  He would like to closely monitor symptoms follow-up in 3 months for recheck if symptoms persist or worsen he will contact office to plan manometry study.    Follow-up: Return in about 3 months (around 5/23/2023) for Recheck.     HPI  67-year-old male presents for follow-up after EGD done due to dysphagia and GERD.  States dilation helped minimally still having frequent symptoms not always when he eats sometimes when he just sitting at his computer will feel choked on spit.  Has been taking Pepcid and Carafate is followed by nephrologist was previously on PPI with good control of symptoms but unable to stay on PPI due to renal function.  He reports bowel movements are regular with occasional constipation but overall done well previous colonoscopy was 2017 with repeat recommended in 10 years for surveillance.  EGD noted benign-appearing stenosis dilated 54 Icelandic grade 3 esophagitis gastritis and normal duodenum.  Antral biopsy noted reactive gastropathy negative for H. pylori metaplasia or dysplasia.  Esophageal biopsy noted squamous mucosa with mild reflux related changes  "negative for intraepithelial eosinophils metaplasia or dysplasia.  Patient reports when seen by [facility] he had a esophagram which was normal    Review of Systems  Review of Systems   Constitutional: Positive for fatigue. Negative for activity change, appetite change, chills, diaphoresis, fever and unexpected weight change.   HENT: Positive for trouble swallowing.    Respiratory: Positive for cough (frequent throat clearing ). Negative for shortness of breath.    Gastrointestinal: Positive for constipation (every 2-3 days takes fiber, magnesium ). Negative for abdominal distention, abdominal pain, anal bleeding, blood in stool, diarrhea, nausea, rectal pain and vomiting.       /96 (BP Location: Left arm)   Pulse 85   Ht 177.8 cm (70\")   Wt 83.9 kg (185 lb)   BMI 26.54 kg/m²     Objective      Physical Exam  Constitutional:       General: He is not in acute distress.     Appearance: Normal appearance. He is normal weight. He is not ill-appearing.   HENT:      Head: Normocephalic and atraumatic.   Pulmonary:      Effort: Pulmonary effort is normal.   Abdominal:      General: Abdomen is flat. Bowel sounds are normal. There is no distension.      Palpations: Abdomen is soft. There is no mass.      Tenderness: There is no abdominal tenderness.   Neurological:      Mental Status: He is alert.               The following portions of the patient's history were reviewed and updated as appropriate:   Past Medical History:   Diagnosis Date   • Atrial fibrillation (HCC)    • CKD (chronic kidney disease)    • DDD (degenerative disc disease), thoracic    • Diabetes mellitus (HCC)     prediabetes   • GERD (gastroesophageal reflux disease)    • Hyperlipidemia    • Hypertension    • Lipoma of skin      Past Surgical History:   Procedure Laterality Date   • BACK SURGERY      L4-L5 fusion   • CARDIAC ABLATION      x2   • COLONOSCOPY     • SHOULDER SURGERY Bilateral    • UPPER GASTROINTESTINAL ENDOSCOPY      with esophageal " dilation   • WISDOM TOOTH EXTRACTION       Family History   Problem Relation Age of Onset   • Arthritis Mother    • Heart disease Mother    • Rheumatic fever Mother    • Hyperlipidemia Father    • Hypertension Father    • Diabetes Father    • Stroke Father    • Prostate cancer Father    • Skin cancer Father    • Cancer Sister    • Alcohol abuse Brother    • No Known Problems Daughter    • No Known Problems Son    • Prostate cancer Paternal Grandfather    • No Known Problems Sister        No Known Allergies  Social History     Socioeconomic History   • Marital status:    Tobacco Use   • Smoking status: Never   • Smokeless tobacco: Never   Vaping Use   • Vaping Use: Never used   Substance and Sexual Activity   • Alcohol use: Not Currently   • Drug use: Never   • Sexual activity: Defer     Current Medications:  Prior to Admission medications    Medication Sig Start Date End Date Taking? Authorizing Provider   Acetaminophen (TYLENOL 8 HOUR PO) Take 1,000 mg by mouth 3 (Three) Times a Day As Needed.   Yes Jorge Luis Thomas MD   apixaban (ELIQUIS) 5 MG tablet tablet Take 1 tablet by mouth Every 12 (Twelve) Hours. 8/5/22  Yes Oneil Jorgensen MD   atorvastatin (Lipitor) 20 MG tablet Take 1 tablet by mouth Every Night. 1/18/23  Yes Oneil Jorgensen MD   dilTIAZem XR (DILACOR XR) 120 MG 24 hr capsule Take 1 capsule by mouth Daily. 1/18/23  Yes Oneil Jorgensen MD   docusate sodium (Colace) 100 MG capsule Take 1 capsule by mouth 2 (Two) Times a Day As Needed for Constipation. 11/15/22  Yes Oneil Jorgensen MD   famotidine (Pepcid) 20 MG tablet Take 1 tablet by mouth 2 (Two) Times a Day. 1/18/23  Yes Oneil Jorgensen MD   fluticasone (FLONASE) 50 MCG/ACT nasal spray 2 sprays into the nostril(s) as directed by provider Daily As Needed.   Yes ProviderJorge Luis MD   Glucosamine-Chondroit-Vit C-Mn (GLUCOSAMINE 1500 COMPLEX PO) Take 1,500 mg by mouth 2 (two) times a day.   Yes Jorge Luis Thomas MD   magnesium  citrate 1.745 GM/30ML solution solution Take 150 mL by mouth As Needed.   Yes Jorge Luis Thomas MD   metFORMIN ER (GLUCOPHAGE-XR) 500 MG 24 hr tablet Take 1 tablet by mouth Daily With Breakfast. 1/18/23  Yes Oneil Jorgensen MD   methocarbamol (ROBAXIN) 750 MG tablet Take 750 mg by mouth Every 8 (Eight) Hours As Needed. 12/29/22  Yes Jorge Luis Thomas MD   Multiple Vitamins-Minerals (MULTIVITAMIN WITH MINERALS) tablet tablet Take 1 tablet by mouth Daily. GETS OTC   Yes Jorge Luis Thomas MD   Saw Palmetto 450 MG capsule Take 1 capsule by mouth 2 (two) times a day. GETS OTC   Yes Jorge Luis Thomas MD   sucralfate (Carafate) 1 GM/10ML suspension Take 10 mL by mouth 4 (Four) Times a Day. 12/20/22  Yes Oneil Jorgensen MD   tamsulosin (Flomax) 0.4 MG capsule 24 hr capsule Take 1 capsule by mouth Daily. 10/19/22  Yes Oneil Jorgensen MD   telmisartan (Micardis) 20 MG tablet Take 1 tablet by mouth Daily. 11/3/22  Yes Oneil Jorgensen MD   VITAMIN D, ERGOCALCIFEROL, PO Take 1 capsule by mouth Daily. GETS OTC   Yes Jorge Luis Thomas MD     Orders placed during this encounter include:  No orders of the defined types were placed in this encounter.    * Surgery not found *  No orders of the defined types were placed in this encounter.        Review and/or summary of lab tests, radiology, procedures, medications. Review and summary of old records and obtaining of history. The risks and benefits of my recommendations, as well as other treatment options were discussed . Any questions/concerned were answered. Patient voiced understanding and agreement.          This document has been electronically signed by ERIN Marks on February 23, 2023 10:30 CST                                               Results for orders placed or performed during the hospital encounter of 02/15/23   TISSUE EXAM, [facility] ([facility])    Specimen: A: Gastric, Antrum; Tissue    B: Esophagus, Distal; Tissue   Result Value Ref  Range    Reference Lab Report       [facility]  [address] [place] [address]  Phone: [phone] or [phone]  Fax: [phone]  Murali Coreas M.D., Medical Director    PATIENT NAME                           LABORATORY NO.  ANGEL JENSEN.                   [id number]  [id number]                         AGE              SEX  N           CLIENT REF #  [facility]           67      1955      [id number]   [id number]    [place]                       REQUESTING M.D.     ATTENDING M.D.     COPY TO[address]                 LOLIS RUEDA DAVID  [place] [id number]             DATE COLLECTED      DATE RECEIVED      DATE REPORTED  02/15/2023          02/15/2023         2023    DIAGNOSIS:  A.   ANTRUM, BIOPSY:  Reactive gastropathy  No Helicobacter pylori like organisms identified on H&E stained slide  No intestinal metaplasia or dysplasia identified  B.   ESOPHAGUS, BIOPSY, DISTAL:  Squamous mucosa with mild  reactive/reflux related changes  No increased intraepithelial eosinophils, intestinal metaplasia or  dysplasia identified    CLINICAL HISTORY:  Esophageal dysphagia, history of esophageal stricture, gastroesophageal reflux  disease with esophagitis without hemorrhage    SPECIMENS RECEIVED:  A.  ANTRUM, BIOPSY  B.  ESOPHAGUS, BIOPSY, DISTAL    MICROSCOPIC DESCRIPTION:  Tissue blocks are prepared and slides are examined microscopically on all  specimens. See diagnosis for details.    Professional interpretation rendered by Emily Woods D.O., F.C.A.P. at  [facility] [employer], [address] [place] [address], [place] [address].    GROSS DESCRIPTION:  A.  Labeled as antrum, consisting of 1 piece of tan soft tissue measuring 0.5 x  0.3 x 0.2 cm, submitted entirely in 1 cassette.  SOG  B.  Labeled as distal esophagus, consisting of 2 pieces of tan soft tissue  measuring 0.4 x 0.4 x 0.2 cm, submitted entirely in 1  cassette.    REVIEWED, DIAGNOSED AND ELECTRONICALLY  SIGNED BY:    Emily Woods D.O.,  MARGARITA  CPT CODES:  88305x2     POC Glucose Once    Specimen: Blood   Result Value Ref Range    Glucose 91 70 - 130 mg/dL   Results for orders placed or performed in visit on 01/23/23   PSA Screen    Specimen: Blood   Result Value Ref Range    PSA 1.670 0.000 - 4.000 ng/mL   Urinalysis With Microscopic If Indicated (No Culture) - Urine, Clean Catch    Specimen: Urine, Clean Catch   Result Value Ref Range    Color, UA Yellow Yellow, Straw    Appearance, UA Clear Clear    pH, UA 7.0 5.0 - 8.0    Specific Gravity, UA 1.011 1.005 - 1.030    Glucose, UA Negative Negative    Ketones, UA Negative Negative    Bilirubin, UA Negative Negative    Blood, UA Negative Negative    Protein, UA Negative Negative    Leuk Esterase, UA Negative Negative    Nitrite, UA Negative Negative    Urobilinogen, UA 0.2 E.U./dL 0.2 - 1.0 E.U./dL   Results for orders placed or performed in visit on 12/01/22   ECG 12 Lead   Result Value Ref Range    QT Interval 360 ms    QTC Interval 418 ms   Results for orders placed or performed in visit on 11/01/22   CBC Auto Differential    Specimen: Blood   Result Value Ref Range    WBC 5.99 3.40 - 10.80 10*3/mm3    RBC 4.30 4.14 - 5.80 10*6/mm3    Hemoglobin 13.3 13.0 - 17.7 g/dL    Hematocrit 39.1 37.5 - 51.0 %    MCV 90.9 79.0 - 97.0 fL    MCH 30.9 26.6 - 33.0 pg    MCHC 34.0 31.5 - 35.7 g/dL    RDW 12.0 (L) 12.3 - 15.4 %    RDW-SD 40.2 37.0 - 54.0 fl    MPV 10.3 6.0 - 12.0 fL    Platelets 204 140 - 450 10*3/mm3    Neutrophil % 66.3 42.7 - 76.0 %    Lymphocyte % 24.0 19.6 - 45.3 %    Monocyte % 8.3 5.0 - 12.0 %    Eosinophil % 0.8 0.3 - 6.2 %    Basophil % 0.3 0.0 - 1.5 %    Immature Grans % 0.3 0.0 - 0.5 %    Neutrophils, Absolute 3.96 1.70 - 7.00 10*3/mm3    Lymphocytes, Absolute 1.44 0.70 - 3.10 10*3/mm3    Monocytes, Absolute 0.50 0.10 - 0.90 10*3/mm3    Eosinophils, Absolute 0.05 0.00 - 0.40 10*3/mm3     Basophils, Absolute 0.02 0.00 - 0.20 10*3/mm3    Immature Grans, Absolute 0.02 0.00 - 0.05 10*3/mm3    nRBC 0.0 0.0 - 0.2 /100 WBC   Vitamin D 25 Hydroxy    Specimen: Blood   Result Value Ref Range    25 Hydroxy, Vitamin D 51.5 30.0 - 100.0 ng/ml   Hemoglobin A1c    Specimen: Blood   Result Value Ref Range    Hemoglobin A1C 6.00 (H) 4.80 - 5.60 %   Vitamin B12    Specimen: Blood   Result Value Ref Range    Vitamin B-12 565 211 - 946 pg/mL   Lipid Panel    Specimen: Blood   Result Value Ref Range    Total Cholesterol 131 0 - 200 mg/dL    Triglycerides 41 0 - 150 mg/dL    HDL Cholesterol 67 (H) 40 - 60 mg/dL    LDL Cholesterol  54 0 - 100 mg/dL    VLDL Cholesterol 10 5 - 40 mg/dL    LDL/HDL Ratio 0.83    Comprehensive Metabolic Panel    Specimen: Blood   Result Value Ref Range    Glucose 106 (H) 65 - 99 mg/dL    BUN 10 8 - 23 mg/dL    Creatinine 1.28 (H) 0.76 - 1.27 mg/dL    Sodium 142 136 - 145 mmol/L    Potassium 4.2 3.5 - 5.2 mmol/L    Chloride 106 98 - 107 mmol/L    CO2 25.1 22.0 - 29.0 mmol/L    Calcium 9.8 8.6 - 10.5 mg/dL    Total Protein 6.6 6.0 - 8.5 g/dL    Albumin 4.50 3.50 - 5.20 g/dL    ALT (SGPT) 20 1 - 41 U/L    AST (SGOT) 28 1 - 40 U/L    Alkaline Phosphatase 67 39 - 117 U/L    Total Bilirubin 0.5 0.0 - 1.2 mg/dL    Globulin 2.1 gm/dL    A/G Ratio 2.1 g/dL    BUN/Creatinine Ratio 7.8 7.0 - 25.0    Anion Gap 10.9 5.0 - 15.0 mmol/L    eGFR 61.7 >60.0 mL/min/1.73   Results for orders placed or performed in visit on 09/02/22   PSA Diagnostic    Specimen: Blood   Result Value Ref Range    PSA 1.240 0.000 - 4.000 ng/mL   Results for orders placed or performed in visit on 07/25/22   POCT SARS-CoV-2 Antigen ESTELLA + Flu    Specimen: Swab   Result Value Ref Range    SARS Antigen Not Detected Not Detected, Presumptive Negative    Influenza A Antigen ESTELLA Not Detected Not Detected    Influenza B Antigen ESTELLA Not Detected Not Detected    Internal Control Passed Passed    Lot Number [id number]     Expiration Date  3,112,023    COVID-19, [facility] IN-HOUSE, NP SWAB IN TRANSPORT MEDIA 8-10 HR TAT - Swab, Anterior nasal    Specimen: Anterior nasal; Swab   Result Value Ref Range    COVID19 Not Detected Not Detected - Ref. Range   Results for orders placed or performed in visit on 06/15/22   CBC Auto Differential    Specimen: Blood   Result Value Ref Range    WBC 5.20 3.40 - 10.80 10*3/mm3    RBC 4.83 4.14 - 5.80 10*6/mm3    Hemoglobin 14.4 13.0 - 17.7 g/dL    Hematocrit 42.9 37.5 - 51.0 %    MCV 88.8 79.0 - 97.0 fL    MCH 29.8 26.6 - 33.0 pg    MCHC 33.6 31.5 - 35.7 g/dL    RDW 12.5 12.3 - 15.4 %    RDW-SD 39.8 37.0 - 54.0 fl    MPV 10.7 6.0 - 12.0 fL    Platelets 179 140 - 450 10*3/mm3    Neutrophil % 63.8 42.7 - 76.0 %    Lymphocyte % 27.1 19.6 - 45.3 %    Monocyte % 7.7 5.0 - 12.0 %    Eosinophil % 0.6 0.3 - 6.2 %    Basophil % 0.4 0.0 - 1.5 %    Immature Grans % 0.4 0.0 - 0.5 %    Neutrophils, Absolute 3.32 1.70 - 7.00 10*3/mm3    Lymphocytes, Absolute 1.41 0.70 - 3.10 10*3/mm3    Monocytes, Absolute 0.40 0.10 - 0.90 10*3/mm3    Eosinophils, Absolute 0.03 0.00 - 0.40 10*3/mm3    Basophils, Absolute 0.02 0.00 - 0.20 10*3/mm3    Immature Grans, Absolute 0.02 0.00 - 0.05 10*3/mm3    nRBC 0.2 0.0 - 0.2 /100 WBC     *Note: Due to a large number of results and/or encounters for the requested time period, some results have not been displayed. A complete set of results can be found in Results Review.

## 2023-02-24 ENCOUNTER — OFFICE VISIT (OUTPATIENT)
Dept: FAMILY MEDICINE CLINIC | Facility: CLINIC | Age: 68
End: 2023-02-24
Payer: MEDICARE

## 2023-02-24 VITALS
WEIGHT: 184.4 LBS | TEMPERATURE: 97.6 F | DIASTOLIC BLOOD PRESSURE: 80 MMHG | HEIGHT: 70 IN | SYSTOLIC BLOOD PRESSURE: 114 MMHG | BODY MASS INDEX: 26.4 KG/M2 | OXYGEN SATURATION: 99 % | HEART RATE: 96 BPM

## 2023-02-24 DIAGNOSIS — E66.3 OVERWEIGHT: ICD-10-CM

## 2023-02-24 DIAGNOSIS — E55.9 VITAMIN D DEFICIENCY: ICD-10-CM

## 2023-02-24 DIAGNOSIS — R73.03 PREDIABETES: ICD-10-CM

## 2023-02-24 DIAGNOSIS — I48.19 ATRIAL FIBRILLATION, PERSISTENT: ICD-10-CM

## 2023-02-24 DIAGNOSIS — Z98.890 HISTORY OF BACK SURGERY: ICD-10-CM

## 2023-02-24 DIAGNOSIS — E78.2 MIXED HYPERLIPIDEMIA: ICD-10-CM

## 2023-02-24 DIAGNOSIS — Z86.2 HISTORY OF THROMBOCYTOPENIA: ICD-10-CM

## 2023-02-24 DIAGNOSIS — K21.00 GASTROESOPHAGEAL REFLUX DISEASE WITH ESOPHAGITIS WITHOUT HEMORRHAGE: ICD-10-CM

## 2023-02-24 DIAGNOSIS — N18.2 STAGE 2 CHRONIC KIDNEY DISEASE: ICD-10-CM

## 2023-02-24 DIAGNOSIS — I10 ESSENTIAL HYPERTENSION: Primary | ICD-10-CM

## 2023-02-24 PROCEDURE — 99214 OFFICE O/P EST MOD 30 MIN: CPT | Performed by: FAMILY MEDICINE

## 2023-03-06 ENCOUNTER — LAB (OUTPATIENT)
Dept: LAB | Facility: HOSPITAL | Age: 68
End: 2023-03-06
Payer: MEDICARE

## 2023-03-06 DIAGNOSIS — E55.9 VITAMIN D DEFICIENCY: ICD-10-CM

## 2023-03-06 DIAGNOSIS — Z86.2 HISTORY OF THROMBOCYTOPENIA: ICD-10-CM

## 2023-03-06 DIAGNOSIS — K21.00 GASTROESOPHAGEAL REFLUX DISEASE WITH ESOPHAGITIS WITHOUT HEMORRHAGE: ICD-10-CM

## 2023-03-06 DIAGNOSIS — I48.19 ATRIAL FIBRILLATION, PERSISTENT: ICD-10-CM

## 2023-03-06 DIAGNOSIS — I10 ESSENTIAL HYPERTENSION: ICD-10-CM

## 2023-03-06 DIAGNOSIS — N18.2 STAGE 2 CHRONIC KIDNEY DISEASE: ICD-10-CM

## 2023-03-06 DIAGNOSIS — Z98.890 HISTORY OF BACK SURGERY: ICD-10-CM

## 2023-03-06 DIAGNOSIS — E78.2 MIXED HYPERLIPIDEMIA: ICD-10-CM

## 2023-03-06 DIAGNOSIS — R73.03 PREDIABETES: ICD-10-CM

## 2023-03-06 LAB
ALBUMIN SERPL-MCNC: 4.1 G/DL (ref 3.5–5.2)
ALBUMIN/GLOB SERPL: 1.5 G/DL
ALP SERPL-CCNC: 68 U/L (ref 39–117)
ALT SERPL W P-5'-P-CCNC: 18 U/L (ref 1–41)
ANION GAP SERPL CALCULATED.3IONS-SCNC: 8 MMOL/L (ref 5–15)
AST SERPL-CCNC: 24 U/L (ref 1–40)
BASOPHILS # BLD AUTO: 0.01 10*3/MM3 (ref 0–0.2)
BASOPHILS NFR BLD AUTO: 0.2 % (ref 0–1.5)
BILIRUB SERPL-MCNC: 0.4 MG/DL (ref 0–1.2)
BUN SERPL-MCNC: 13 MG/DL (ref 8–23)
BUN/CREAT SERPL: 11.4 (ref 7–25)
CALCIUM SPEC-SCNC: 9.1 MG/DL (ref 8.6–10.5)
CHLORIDE SERPL-SCNC: 108 MMOL/L (ref 98–107)
CHOLEST SERPL-MCNC: 127 MG/DL (ref 0–200)
CO2 SERPL-SCNC: 27 MMOL/L (ref 22–29)
CREAT SERPL-MCNC: 1.14 MG/DL (ref 0.76–1.27)
DEPRECATED RDW RBC AUTO: 40.8 FL (ref 37–54)
EGFRCR SERPLBLD CKD-EPI 2021: 70.9 ML/MIN/1.73
EOSINOPHIL # BLD AUTO: 0.08 10*3/MM3 (ref 0–0.4)
EOSINOPHIL NFR BLD AUTO: 1.4 % (ref 0.3–6.2)
ERYTHROCYTE [DISTWIDTH] IN BLOOD BY AUTOMATED COUNT: 12.5 % (ref 12.3–15.4)
GLOBULIN UR ELPH-MCNC: 2.7 GM/DL
GLUCOSE SERPL-MCNC: 109 MG/DL (ref 65–99)
HBA1C MFR BLD: 5.8 % (ref 4.8–5.6)
HCT VFR BLD AUTO: 41.7 % (ref 37.5–51)
HDLC SERPL-MCNC: 52 MG/DL (ref 40–60)
HGB BLD-MCNC: 13.8 G/DL (ref 13–17.7)
IMM GRANULOCYTES # BLD AUTO: 0.02 10*3/MM3 (ref 0–0.05)
IMM GRANULOCYTES NFR BLD AUTO: 0.3 % (ref 0–0.5)
LDLC SERPL CALC-MCNC: 63 MG/DL (ref 0–100)
LDLC/HDLC SERPL: 1.22 {RATIO}
LYMPHOCYTES # BLD AUTO: 1.4 10*3/MM3 (ref 0.7–3.1)
LYMPHOCYTES NFR BLD AUTO: 23.7 % (ref 19.6–45.3)
MCH RBC QN AUTO: 29.9 PG (ref 26.6–33)
MCHC RBC AUTO-ENTMCNC: 33.1 G/DL (ref 31.5–35.7)
MCV RBC AUTO: 90.5 FL (ref 79–97)
MONOCYTES # BLD AUTO: 0.46 10*3/MM3 (ref 0.1–0.9)
MONOCYTES NFR BLD AUTO: 7.8 % (ref 5–12)
NEUTROPHILS NFR BLD AUTO: 3.93 10*3/MM3 (ref 1.7–7)
NEUTROPHILS NFR BLD AUTO: 66.6 % (ref 42.7–76)
NRBC BLD AUTO-RTO: 0 /100 WBC (ref 0–0.2)
PLATELET # BLD AUTO: 184 10*3/MM3 (ref 140–450)
PMV BLD AUTO: 10.5 FL (ref 6–12)
POTASSIUM SERPL-SCNC: 4 MMOL/L (ref 3.5–5.2)
PROT SERPL-MCNC: 6.8 G/DL (ref 6–8.5)
RBC # BLD AUTO: 4.61 10*6/MM3 (ref 4.14–5.8)
SODIUM SERPL-SCNC: 143 MMOL/L (ref 136–145)
TRIGL SERPL-MCNC: 57 MG/DL (ref 0–150)
VLDLC SERPL-MCNC: 12 MG/DL (ref 5–40)
WBC NRBC COR # BLD: 5.9 10*3/MM3 (ref 3.4–10.8)

## 2023-03-06 PROCEDURE — 85025 COMPLETE CBC W/AUTO DIFF WBC: CPT

## 2023-03-06 PROCEDURE — 80061 LIPID PANEL: CPT

## 2023-03-06 PROCEDURE — 80053 COMPREHEN METABOLIC PANEL: CPT

## 2023-03-06 PROCEDURE — 83036 HEMOGLOBIN GLYCOSYLATED A1C: CPT

## 2023-03-19 DIAGNOSIS — N40.1 BENIGN PROSTATIC HYPERPLASIA WITH LOWER URINARY TRACT SYMPTOMS, SYMPTOM DETAILS UNSPECIFIED: ICD-10-CM

## 2023-03-20 ENCOUNTER — OFFICE VISIT (OUTPATIENT)
Dept: ORTHOPEDIC SURGERY | Facility: CLINIC | Age: 68
End: 2023-03-20
Payer: MEDICARE

## 2023-03-20 VITALS — WEIGHT: 184 LBS | BODY MASS INDEX: 26.34 KG/M2 | HEIGHT: 70 IN

## 2023-03-20 DIAGNOSIS — M19.011 ARTHRITIS OF RIGHT ACROMIOCLAVICULAR JOINT: ICD-10-CM

## 2023-03-20 DIAGNOSIS — G89.29 CHRONIC RIGHT SHOULDER PAIN: Primary | ICD-10-CM

## 2023-03-20 DIAGNOSIS — M19.019 GLENOHUMERAL ARTHRITIS: ICD-10-CM

## 2023-03-20 DIAGNOSIS — M25.511 CHRONIC RIGHT SHOULDER PAIN: Primary | ICD-10-CM

## 2023-03-20 DIAGNOSIS — M24.811 INTERNAL DERANGEMENT OF RIGHT SHOULDER: ICD-10-CM

## 2023-03-20 PROCEDURE — 1160F RVW MEDS BY RX/DR IN RCRD: CPT | Performed by: NURSE PRACTITIONER

## 2023-03-20 PROCEDURE — 1159F MED LIST DOCD IN RCRD: CPT | Performed by: NURSE PRACTITIONER

## 2023-03-20 PROCEDURE — 20610 DRAIN/INJ JOINT/BURSA W/O US: CPT | Performed by: NURSE PRACTITIONER

## 2023-03-20 RX ORDER — ATORVASTATIN CALCIUM 20 MG/1
20 TABLET, FILM COATED ORAL NIGHTLY
Qty: 90 TABLET | Refills: 0 | Status: SHIPPED | OUTPATIENT
Start: 2023-03-20

## 2023-03-20 RX ORDER — TAMSULOSIN HYDROCHLORIDE 0.4 MG/1
1 CAPSULE ORAL DAILY
Qty: 90 CAPSULE | Refills: 0 | Status: SHIPPED | OUTPATIENT
Start: 2023-03-20

## 2023-03-20 RX ORDER — LIDOCAINE HYDROCHLORIDE 10 MG/ML
2 INJECTION, SOLUTION INFILTRATION; PERINEURAL
Status: COMPLETED | OUTPATIENT
Start: 2023-03-20 | End: 2023-03-20

## 2023-03-20 RX ORDER — TRIAMCINOLONE ACETONIDE 40 MG/ML
40 INJECTION, SUSPENSION INTRA-ARTICULAR; INTRAMUSCULAR
Status: COMPLETED | OUTPATIENT
Start: 2023-03-20 | End: 2023-03-20

## 2023-03-20 RX ADMIN — LIDOCAINE HYDROCHLORIDE 2 ML: 10 INJECTION, SOLUTION INFILTRATION; PERINEURAL at 15:33

## 2023-03-20 RX ADMIN — TRIAMCINOLONE ACETONIDE 40 MG: 40 INJECTION, SUSPENSION INTRA-ARTICULAR; INTRAMUSCULAR at 15:33

## 2023-03-20 NOTE — PROGRESS NOTES
"Sergio Sandoval is a 67 y.o. male returns for     Chief Complaint   Patient presents with   • Right Shoulder - Follow-up       HISTORY OF PRESENT ILLNESS:      Mr. Sandoval is a 67 year old male who presents today to follow-up on right shoulder pain.  Shoulder pain has been present for approximately 9 months.  Denies injury.  He reports rotator cuff repair around 7 years ago.  He describes his pain as moderate and intermittent.  Pain is associated with popping.  He has tried rice therapy.  No numbness or tingling.   He has pain with overhead activity and when lying on right side.  He reports pain wakes him up at night.  Last injection was given 4 months ago and gave him good relief though he is starting to consider surgical management.  He reports he is currently seeing a neurosurgeon and recently had back surgery.          CONCURRENT MEDICAL HISTORY:    The following portions of the patient's history were reviewed and updated as appropriate: allergies, current medications, past family history, past medical history, past social history, past surgical history and problem list.     ROS  No fevers or chills.  No chest pain or shortness of air.  No GI or  disturbances.    PHYSICAL EXAMINATION:       Ht 177.8 cm (70\")   Wt 83.5 kg (184 lb)   BMI 26.40 kg/m²     Physical Exam  Vitals and nursing note reviewed.   Constitutional:       General: He is not in acute distress.     Appearance: He is well-developed. He is not toxic-appearing.   HENT:      Head: Normocephalic.   Pulmonary:      Effort: Pulmonary effort is normal. No respiratory distress.   Skin:     General: Skin is warm and dry.   Neurological:      Mental Status: He is alert and oriented to person, place, and time.   Psychiatric:         Behavior: Behavior normal.         Thought Content: Thought content normal.         Judgment: Judgment normal.         GAIT:     []  Normal  []  Antalgic    Assistive device: []  None  []  Walker     []  Crutches  []  " Cane     []  Wheelchair  []  Stretcher    Right Shoulder Exam     Tenderness   The patient is experiencing tenderness in the acromion.    Range of Motion   Active abduction: 130   Forward flexion: normal     Other   Erythema: absent  Sensation: normal  Pulse: present    Comments:  No evidence of infection              No results found.    EXAM: XR SHOULDER 2 OR MORE VIEWS RIGHT     HISTORY: pain, M25.511 Pain in right shoulder.     COMPARISON: None     FINDINGS:     Mineralization: Osseous demineralization     Bones: Glenohumeral joint space narrowing. Small humeral  osteophytes. Small pseudocysts lateral humeral head. Prior  resection distal clavicle.     Soft tissues: Normal     IMPRESSION:     Glenohumeral osteoarthritis     Electronically signed by:  Erick Patricio DO  11/29/2022 9:51 AM  CST Workstation: [id number]      ASSESSMENT:    Diagnoses and all orders for this visit:    Chronic right shoulder pain    Arthritis of right acromioclavicular joint    Glenohumeral arthritis    Internal derangement of right shoulder    Other orders  -     Large Joint Arthrocentesis: R subacromial bursa          PLAN    Large Joint Arthrocentesis: R subacromial bursa  Date/Time: 3/20/2023 3:33 PM  Consent given by: patient  Site marked: site marked  Timeout: Immediately prior to procedure a time out was called to verify the correct patient, procedure, equipment, support staff and site/side marked as required   Supporting Documentation  Indications: pain   Procedure Details  Location: shoulder - R subacromial bursa  Preparation: Patient was prepped and draped in the usual sterile fashion  Needle size: 22 G  Approach: posterior  Medications administered: 40 mg triamcinolone acetonide 40 MG/ML; 2 mL lidocaine 1 %  Patient tolerance: patient tolerated the procedure well with no immediate complications            Patient reports good relief with last subacromial injection given to his right shoulder.  After discussing risk,  benefits, alternatives, patient desires to proceed with repeat subacromial injection for right shoulder.  Patient is not a candidate for NSAIDs because he takes Eliquis.  Patient tolerated injection well.  Patient to continue RICE therapy, activity modification, gentle stretching as needed.  Signs symptoms to report and when to seek care explained, patient verbalized understanding.        Patient states he is going to consider MRI for surgical intervention.  He is to call me if or whenever he decides to proceed with this.    Return in about 3 months (around 6/20/2023), or if symptoms worsen or fail to improve.    EMR Dragon/Transciption Disclaimer: Some of this note may be an electronic transcription/translation of spoken language to printed text.  The electronic translation of spoken language may permit erroneous, or at times, nonsensical words or phrases to be inadvertently transcribed. Although I have reviewed the note for such errors, some may still exist.       This document has been electronically signed by Jayne KHAN on March 20, 2023 16:07 CDT

## 2023-03-25 DIAGNOSIS — K21.9 GASTROESOPHAGEAL REFLUX DISEASE, UNSPECIFIED WHETHER ESOPHAGITIS PRESENT: ICD-10-CM

## 2023-03-27 RX ORDER — FAMOTIDINE 20 MG/1
20 TABLET, FILM COATED ORAL 2 TIMES DAILY
Qty: 180 TABLET | Refills: 0 | Status: SHIPPED | OUTPATIENT
Start: 2023-03-27

## 2023-04-18 NOTE — PROGRESS NOTES
Subjective:  Sergio Sandoval is a 67 y.o. male who presents for       Patient Active Problem List   Diagnosis   • Mixed hyperlipidemia   • Essential hypertension   • Vitamin D deficiency   • Gastroesophageal reflux disease   • Benign prostatic hyperplasia with lower urinary tract symptoms   • Benign prostatic hyperplasia with weak urinary stream   • Prediabetes   • Dizziness   • Thrombocytopenia   • Atrial fibrillation, controlled   • Dyspnea on exertion   • History of thrombocytopenia   • History of prediabetes   • Atrial fibrillation, persistent   • Need for pneumococcal vaccine   • Multiple joint pain   • Chronic left shoulder pain   • Chronic midline low back pain   • Pain of left scapula   • Left shoulder tendonitis   • Spinal stenosis of lumbar region   • DDD (degenerative disc disease), lumbar   • Preoperative cardiovascular examination   • Stage 2 chronic kidney disease   • Overweight   • Esophageal dysphagia   • History of esophageal stricture   • History of back surgery   • Gastritis without bleeding   • Esophageal stenosis   • Allergic rhinitis           Current Outpatient Medications:   •  Acetaminophen (TYLENOL 8 HOUR PO), Take 1,000 mg by mouth 3 (Three) Times a Day As Needed., Disp: , Rfl:   •  apixaban (ELIQUIS) 5 MG tablet tablet, Take 1 tablet by mouth Every 12 (Twelve) Hours., Disp: 180 tablet, Rfl: 3  •  atorvastatin (Lipitor) 20 MG tablet, Take 1 tablet by mouth Every Night., Disp: 90 tablet, Rfl: 1  •  dilTIAZem XR (DILACOR XR) 120 MG 24 hr capsule, Take 1 capsule by mouth Daily., Disp: 90 capsule, Rfl: 1  •  docusate sodium (Colace) 100 MG capsule, Take 1 capsule by mouth 2 (Two) Times a Day As Needed for Constipation., Disp: 60 capsule, Rfl: 3  •  famotidine (Pepcid) 20 MG tablet, Take 1 tablet by mouth 2 (Two) Times a Day., Disp: 180 tablet, Rfl: 1  •  fluticasone (FLONASE) 50 MCG/ACT nasal spray, 2 sprays into the nostril(s) as directed by provider Daily As Needed., Disp: , Rfl:   •   Glucosamine-Chondroit-Vit C-Mn (GLUCOSAMINE 1500 COMPLEX PO), Take 1,500 mg by mouth 2 (two) times a day., Disp: , Rfl:   •  magnesium citrate 1.745 GM/30ML solution solution, Take 150 mL by mouth As Needed., Disp: , Rfl:   •  metFORMIN ER (GLUCOPHAGE-XR) 500 MG 24 hr tablet, Take 1 tablet by mouth Daily With Breakfast., Disp: 30 tablet, Rfl: 3  •  methocarbamol (ROBAXIN) 750 MG tablet, Take 1 tablet by mouth Every 8 (Eight) Hours As Needed., Disp: , Rfl:   •  Multiple Vitamins-Minerals (MULTIVITAMIN WITH MINERALS) tablet tablet, Take 1 tablet by mouth Daily. GETS OTC, Disp: , Rfl:   •  Saw Palmetto 450 MG capsule, Take 1 capsule by mouth 2 (two) times a day. GETS OTC, Disp: , Rfl:   •  sucralfate (Carafate) 1 GM/10ML suspension, Take 10 mL by mouth 4 (Four) Times a Day., Disp: 414 mL, Rfl: 3  •  tamsulosin (Flomax) 0.4 MG capsule 24 hr capsule, Take 1 capsule by mouth Daily., Disp: 90 capsule, Rfl: 1  •  telmisartan (Micardis) 20 MG tablet, Take 1 tablet by mouth Daily., Disp: 30 tablet, Rfl: 3  •  VITAMIN D, ERGOCALCIFEROL, PO, Take 1 capsule by mouth Daily. GETS OTC, Disp: , Rfl:   •  montelukast (Singulair) 10 MG tablet, Take 1 tablet by mouth Every Night., Disp: 30 tablet, Rfl: 3    Pt is 68 yo male with management of, GERD with esophagitis, gastritis, history of esophageal stenosis , HTN, HLP,  CAD, sp bilateral shoulder surgery, SVT, history of prediabetes, thrombocytopenia, atrial fibrillation sp ablation x 2 , CKD stage 2. History of COVID-19 infection, chronic left shoulder pain. Chronic lower back pain (DDD lumbar spine, mild spinal stenosis)     12/20/22 in office visit for recheck. Pt saw Orthopedic on 11/29/22 for his rigth shoulder pain and was given injection in right shoulder. He also saw Cardiologist on 12/1/22 for his persistent atrial fibrillation and is to continue dilacor  mg daily along with eliquis he was considered low to moderate risk for pending surgeyr of his neck and he is to stop  eliquis 72 hours prior to surgery. BP and HR stasble today.  He is having issues with his GERD and is taking pepcid 20 mg PO BID. His last EGD was 2 years ago in [facility] and was told he had esophagitis and had dilation of esophageal stricture. He has trouble swallowing.     2/25/23 in office visit for recheck. Since last visit pt has had spine surgery performed by Dr. Crawford in [place]. Pt had EGD on 2/15/23 and was found to have benign intrinsic stenosis with dilation, gastritis and esophagitis. Tissue pathology showed reactive gastropathy. Pt saw Urology and had labwork on 1/23/23 with normal UA and PSA. BP and HR is stable today.  Pt is feeling better in regards to his lower back. He has less pain. He is doing home exercise.  He will see Dr. Crawford next  months.  He is only taking tylenol for pain.  His swallowing is better. His heart is stable. No chest pain no dizziness.      5/24/23 in office visit for recheck. Pt saw Nephrology on 3/13/23 for his CKD stage 3a. Also saw Orthopedic on 3/20/23 for his right shoulder pain and was given right shoulder injection. pth ad labwork on 3/6/23 that showed CBC normal lipid panel stable hga1c at 5.80 from 6.00 CMP showed GFR at 70.9 with normal liver enzymes.  Fasting sugars at 109. Pt is doing well overall. His back pain is stable and better. He has upcoming appt with Neurosurgery in June. No chest pain. He does have occasional dizziness when standing up. His BP has been stable. He is on micardis 20 mg daily and dilacor 120 mg daily.  He is having allergy issues and postnasal drip. He is using flonase every other day and benadryl at bedtime. He does cough at times          Heartburn  He complains of abdominal pain and coughing. He reports no belching, no chest pain, no choking, no dysphagia, no early satiety, no globus sensation, no heartburn, no hoarse voice, no nausea, no sore throat, no stridor, no tooth decay, no water brash or no wheezing. This is a recurrent  problem. The current episode started more than 1 month ago. The problem occurs constantly. Nothing aggravates the symptoms. Associated symptoms include fatigue. He has tried an antacid for the symptoms. The treatment provided no relief. Past procedures do not include an abdominal ultrasound, an EGD, esophageal manometry, H. pylori antibody titer or a UGI. Past invasive treatments do not include gastroplasty, gastroplication or reflux surgery.   Atrial Fibrillation  Presents for follow-up visit. Symptoms include weakness. Symptoms are negative for an AICD problem, bradycardia, chest pain, dizziness, hypertension, hypotension, pacemaker problem, palpitations, shortness of breath, syncope and tachycardia. The symptoms have been stable. Past medical history includes atrial fibrillation.   Back Pain  This is a chronic problem. The current episode started more than 1 month ago. The problem occurs rarely The pain is present in the gluteal and lumbar spine. The quality of the pain is described as aching. The pain is at a severity of 2/10. The symptoms are aggravated by bending and position. Stiffness is present all day. Associated symptoms include dysuria, numbness and weakness. Pertinent negatives include no abdominal pain, bladder incontinence, bowel incontinence, chest pain, fever, headaches, leg pain, paresis, paresthesias, pelvic pain, perianal numbness, tingling or weight loss. He has tried NSAIDs (neurontin cymbalta,back surgery  ) for the symptoms. The treatment provided significant  relief.   Shoulder Injury   The incident occurred at home. The left shoulder is affected. The incident occurred more than 1 week ago. There was no injury mechanism. The quality of the pain is described as shooting. Associated symptoms include numbness. Pertinent negatives include no chest pain or tingling. The symptoms are aggravated by movement, overhead lifting and palpation. Treatments tried: neurontin. The treatment provided no  relief.   Chronic Kidney Disease  This is a chronic problem. The current episode started more than 1 year ago. The problem occurs constantly. The problem has been unchanged. Associated symptoms include arthralgias. Pertinent negatives include no abdominal pain, anorexia, change in bowel habit, chest pain, chills, congestion, coughing, fatigue, fever, headaches, joint swelling, myalgias, nausea, neck pain, numbness, sore throat, swollen glands, urinary symptoms, vertigo, visual change, vomiting or weakness. Nothing aggravates the symptoms. He has tried nothing for the symptoms. The treatment provided no relief.   Heart Problem  This is a chronic problem. The current episode started more than 1 year ago. The problem occurs constantly. The problem has been unchanged. Associated symptoms include arthralgias and fatigue. Pertinent negatives include no abdominal pain, anorexia, change in bowel habit, chest pain, chills, congestion, coughing, diaphoresis, fever, headaches, joint swelling, myalgias, nausea, neck pain, numbness, sore throat, swollen glands, urinary symptoms, vertigo, visual change, vomiting or weakness. The symptoms are aggravated by bending. Treatments tried: flecanaide  The treatment provided significant relief.   Hypertension   This is a chronic problem. The current episode started more than 1 month ago. The problem is unchanged. The problem is uncontrolled. Pertinent negatives include no anxiety, blurred vision, chest pain, headaches, malaise/fatigue, neck pain, orthopnea, palpitations, peripheral edema, PND, shortness of breath or sweats. Risk factors for coronary artery disease include male gender and dyslipidemia. Past treatments include beta blockers, angiotensin blockers and diuretics. Current antihypertension treatment includes beta blockers, angiotensin blockers and diuretics. The current treatment provides moderate improvement. There are no compliance problems.  There is no history  of angina, kidney disease, CAD/MI, CVA, heart failure, left ventricular hypertrophy, PVD or retinopathy. There is no history of chronic renal disease, coarctation of the aorta, hyperaldosteronism, hypercortisolism, hyperparathyroidism, a hypertension causing med, pheochromocytoma, renovascular disease, sleep apnea or a thyroid problem.   Hyperlipidemia   This is a chronic problem. The current episode started more than 1 year ago. He has no history of chronic renal disease. Pertinent negatives include no chest pain or shortness of breath. The current treatment provides mild improvement of lipids. There are no compliance problems.     Male  Problem   The patient's pertinent negatives include no genital injury, genital itching, genital lesions, pelvic pain, penile discharge, penile pain, priapism, scrotal swelling or testicular pain. Primary symptoms comment: difficulty urinating. This is a new problem. The problem occurs constantly. The problem has been improving  Pertinent negatives include no abdominal pain, anorexia, chest pain, chills, constipation, coughing, diarrhea, discolored urine, dysuria, fever, flank pain, frequency, headaches, hematuria, hesitancy, joint pain, joint swelling, nausea, painful intercourse, rash, shortness of breath, urgency, urinary retention or vomiting. Nothing aggravates the symptoms. He has tried nothing for the symptoms. The treatment provided no relief. He is sexually active. There is no history of chlamydia, cryptorchidism, erectile aid use, erectile dysfunction, a femoral hernia, gonorrhea, herpes simplex, HIV, an inguinal hernia, kidney stones, prostatitis, sickle cell disease, syphilis or varicocele. Has tried flomax and had significant relief     Review of Systems  Review of Systems   Constitutional: Positive for activity change and fatigue. Negative for appetite change, chills, diaphoresis and fever.   HENT: Negative for congestion, hoarse voice, postnasal drip, rhinorrhea,  sinus pressure, sinus pain, sneezing, sore throat, trouble swallowing and voice change.    Respiratory: Positive for cough. Negative for choking, chest tightness, shortness of breath, wheezing and stridor.    Cardiovascular: Negative for chest pain.   Gastrointestinal: Positive for abdominal pain. Negative for diarrhea, dysphagia, heartburn, nausea and vomiting.   Musculoskeletal: Positive for arthralgias.   Allergic/Immunologic: Positive for environmental allergies.   Neurological: Positive for weakness and numbness. Negative for headaches.   Hematological: Positive for adenopathy.       Patient Active Problem List   Diagnosis   • Mixed hyperlipidemia   • Essential hypertension   • Vitamin D deficiency   • Gastroesophageal reflux disease   • Benign prostatic hyperplasia with lower urinary tract symptoms   • Benign prostatic hyperplasia with weak urinary stream   • Prediabetes   • Dizziness   • Thrombocytopenia   • Atrial fibrillation, controlled   • Dyspnea on exertion   • History of thrombocytopenia   • History of prediabetes   • Atrial fibrillation, persistent   • Need for pneumococcal vaccine   • Multiple joint pain   • Chronic left shoulder pain   • Chronic midline low back pain   • Pain of left scapula   • Left shoulder tendonitis   • Spinal stenosis of lumbar region   • DDD (degenerative disc disease), lumbar   • Preoperative cardiovascular examination   • Stage 2 chronic kidney disease   • Overweight   • Esophageal dysphagia   • History of esophageal stricture   • History of back surgery   • Gastritis without bleeding   • Esophageal stenosis   • Allergic rhinitis     Past Surgical History:   Procedure Laterality Date   • BACK SURGERY      L4-L5 fusion   • CARDIAC ABLATION      x2   • COLONOSCOPY     • ENDOSCOPY     • ENDOSCOPY N/A 2/15/2023    Procedure: ESOPHAGOGASTRODUODENOSCOPY possible dilation;  Surgeon: Edmund Mckeon MD;  Location: [facility] ENDOSCOPY;  Service: Gastroenterology;  Laterality: N/A;   •  SHOULDER SURGERY Bilateral    • UPPER GASTROINTESTINAL ENDOSCOPY      with esophageal dilation   • WISDOM TOOTH EXTRACTION       Social History     Socioeconomic History   • Marital status:    Tobacco Use   • Smoking status: Never   • Smokeless tobacco: Never   Vaping Use   • Vaping Use: Never used   Substance and Sexual Activity   • Alcohol use: Not Currently   • Drug use: Never   • Sexual activity: Defer     Family History   Problem Relation Age of Onset   • Arthritis Mother    • Heart disease Mother    • Rheumatic fever Mother    • Hyperlipidemia Father    • Hypertension Father    • Diabetes Father    • Stroke Father    • Prostate cancer Father    • Skin cancer Father    • Cancer Sister    • Alcohol abuse Brother    • No Known Problems Daughter    • No Known Problems Son    • Prostate cancer Paternal Grandfather    • No Known Problems Sister      Lab on 03/06/2023   Component Date Value Ref Range Status   • WBC 03/06/2023 5.90  3.40 - 10.80 10*3/mm3 Final   • RBC 03/06/2023 4.61  4.14 - 5.80 10*6/mm3 Final   • Hemoglobin 03/06/2023 13.8  13.0 - 17.7 g/dL Final   • Hematocrit 03/06/2023 41.7  37.5 - 51.0 % Final   • MCV 03/06/2023 90.5  79.0 - 97.0 fL Final   • MCH 03/06/2023 29.9  26.6 - 33.0 pg Final   • MCHC 03/06/2023 33.1  31.5 - 35.7 g/dL Final   • RDW 03/06/2023 12.5  12.3 - 15.4 % Final   • RDW-SD 03/06/2023 40.8  37.0 - 54.0 fl Final   • MPV 03/06/2023 10.5  6.0 - 12.0 fL Final   • Platelets 03/06/2023 184  140 - 450 10*3/mm3 Final   • Neutrophil % 03/06/2023 66.6  42.7 - 76.0 % Final   • Lymphocyte % 03/06/2023 23.7  19.6 - 45.3 % Final   • Monocyte % 03/06/2023 7.8  5.0 - 12.0 % Final   • Eosinophil % 03/06/2023 1.4  0.3 - 6.2 % Final   • Basophil % 03/06/2023 0.2  0.0 - 1.5 % Final   • Immature Grans % 03/06/2023 0.3  0.0 - 0.5 % Final   • Neutrophils, Absolute 03/06/2023 3.93  1.70 - 7.00 10*3/mm3 Final   • Lymphocytes, Absolute 03/06/2023 1.40  0.70 - 3.10 10*3/mm3 Final   • Monocytes,  Absolute 03/06/2023 0.46  0.10 - 0.90 10*3/mm3 Final   • Eosinophils, Absolute 03/06/2023 0.08  0.00 - 0.40 10*3/mm3 Final   • Basophils, Absolute 03/06/2023 0.01  0.00 - 0.20 10*3/mm3 Final   • Immature Grans, Absolute 03/06/2023 0.02  0.00 - 0.05 10*3/mm3 Final   • nRBC 03/06/2023 0.0  0.0 - 0.2 /100 WBC Final   • Glucose 03/06/2023 109 (H)  65 - 99 mg/dL Final   • BUN 03/06/2023 13  8 - 23 mg/dL Final   • Creatinine 03/06/2023 1.14  0.76 - 1.27 mg/dL Final   • Sodium 03/06/2023 143  136 - 145 mmol/L Final   • Potassium 03/06/2023 4.0  3.5 - 5.2 mmol/L Final   • Chloride 03/06/2023 108 (H)  98 - 107 mmol/L Final   • CO2 03/06/2023 27.0  22.0 - 29.0 mmol/L Final   • Calcium 03/06/2023 9.1  8.6 - 10.5 mg/dL Final   • Total Protein 03/06/2023 6.8  6.0 - 8.5 g/dL Final   • Albumin 03/06/2023 4.1  3.5 - 5.2 g/dL Final   • ALT (SGPT) 03/06/2023 18  1 - 41 U/L Final   • AST (SGOT) 03/06/2023 24  1 - 40 U/L Final   • Alkaline Phosphatase 03/06/2023 68  39 - 117 U/L Final   • Total Bilirubin 03/06/2023 0.4  0.0 - 1.2 mg/dL Final   • Globulin 03/06/2023 2.7  gm/dL Final   • A/G Ratio 03/06/2023 1.5  g/dL Final   • BUN/Creatinine Ratio 03/06/2023 11.4  7.0 - 25.0 Final   • Anion Gap 03/06/2023 8.0  5.0 - 15.0 mmol/L Final   • eGFR 03/06/2023 70.9  >60.0 mL/min/1.73 Final   • Hemoglobin A1C 03/06/2023 5.80 (H)  4.80 - 5.60 % Final   • Total Cholesterol 03/06/2023 127  0 - 200 mg/dL Final   • Triglycerides 03/06/2023 57  0 - 150 mg/dL Final   • HDL Cholesterol 03/06/2023 52  40 - 60 mg/dL Final   • LDL Cholesterol  03/06/2023 63  0 - 100 mg/dL Final   • VLDL Cholesterol 03/06/2023 12  5 - 40 mg/dL Final   • LDL/HDL Ratio 03/06/2023 1.22   Final   Admission on 02/15/2023, Discharged on 02/15/2023   Component Date Value Ref Range Status   • Glucose 02/15/2023 91  70 - 130 mg/dL Final    : [id number] HUDSON Garcia ID: [id number]   • Reference Lab Report 02/15/2023    Final                    Value:[facility]  [address] [place] [address]  Phone: [phone] or [phone]  Fax: [phone]  Murali Coreas M.D., Medical Director    PATIENT NAME                           LABORATORY NO.  ANGEL JENSEN.                   [id number]  [id number]                         AGE              SEX  SSN           CLIENT REF #  [facility]           67      1955      [id number]   [id number]    Alma                       REQUESTING M.D.     ATTENDING M.D.     COPY TO.  [address] [place] [address]                 [place] [address]             DATE COLLECTED      DATE RECEIVED      DATE REPORTED  02/15/2023          02/15/2023         2023    DIAGNOSIS:  A.   ANTRUM, BIOPSY:  Reactive gastropathy  No Helicobacter pylori like organisms identified on H&E stained slide  No intestinal metaplasia or dysplasia identified  B.   ESOPHAGUS, BIOPSY, DISTAL:  Squamous mucosa with mild                           reactive/reflux related changes  No increased intraepithelial eosinophils, intestinal metaplasia or  dysplasia identified    CLINICAL HISTORY:  Esophageal dysphagia, history of esophageal stricture, gastroesophageal reflux  disease with esophagitis without hemorrhage    SPECIMENS RECEIVED:  A.  ANTRUM, BIOPSY  B.  ESOPHAGUS, BIOPSY, DISTAL    MICROSCOPIC DESCRIPTION:  Tissue blocks are prepared and slides are examined microscopically on all  specimens. See diagnosis for details.    Professional interpretation rendered by Emily Woods D.O., F.C.A.P. at  [facility], [facility], [address] [place] [address].    GROSS DESCRIPTION:  A.  Labeled as antrum, consisting of 1 piece of tan soft tissue measuring 0.5 x  0.3 x 0.2 cm, submitted entirely in 1 cassette.  SOG  B.  Labeled as distal esophagus, consisting of 2 pieces of tan soft tissue  measuring 0.4 x 0.4 x 0.2 cm, submitted entirely in 1 cassette.    REVIEWED,  DIAGNOSED AND ELECTRONICALLY  SIGNED BY:    Emily Woods D.O.,                           F.C.A.P.  CPT CODES:  88305x2     Lab on 01/23/2023   Component Date Value Ref Range Status   • PSA 01/23/2023 1.670  0.000 - 4.000 ng/mL Final   • Color, UA 01/23/2023 Yellow  Yellow, Straw Final   • Appearance, UA 01/23/2023 Clear  Clear Final   • pH, UA 01/23/2023 7.0  5.0 - 8.0 Final   • Specific Gravity, UA 01/23/2023 1.011  1.005 - 1.030 Final   • Glucose, UA 01/23/2023 Negative  Negative Final   • Ketones, UA 01/23/2023 Negative  Negative Final   • Bilirubin, UA 01/23/2023 Negative  Negative Final   • Blood, UA 01/23/2023 Negative  Negative Final   • Protein, UA 01/23/2023 Negative  Negative Final   • Leuk Esterase, UA 01/23/2023 Negative  Negative Final   • Nitrite, UA 01/23/2023 Negative  Negative Final   • Urobilinogen, UA 01/23/2023 0.2 E.U./dL  0.2 - 1.0 E.U./dL Final   Office Visit on 12/01/2022   Component Date Value Ref Range Status   • QT Interval 12/01/2022 360  ms Final   • QTC Interval 12/01/2022 418  ms Final      XR Shoulder 2+ View Right  Narrative: EXAM: XR SHOULDER 2 OR MORE VIEWS RIGHT    HISTORY: pain, M25.511 Pain in right shoulder.    COMPARISON: None    FINDINGS:    Mineralization: Osseous demineralization    Bones: Glenohumeral joint space narrowing. Small humeral  osteophytes. Small pseudocysts lateral humeral head. Prior  resection distal clavicle.    Soft tissues: Normal  Impression: Glenohumeral osteoarthritis    Electronically signed by:  Erick Patricio DO  11/29/2022 9:51 AM  CST Workstation: [id number]    [email or web address]  Immunization History   Administered Date(s) Administered   • COVID-19 (MODERNA) 1st,2nd,3rd Dose Monovalent 01/06/2021, 02/03/2021   • FluLaval/Fluzone >6mos 09/15/2020   • Fluzone High-Dose 65+yrs 09/29/2021, 10/19/2022   • Pneumococcal Polysaccharide (PPSV23) 10/28/2021       The following portions of the patient's history were reviewed and updated as appropriate:  "allergies, current medications, past family history, past medical history, past social history, past surgical history and problem list.        Physical Exam   /72 (BP Location: Right arm, Patient Position: Sitting, Cuff Size: Adult)   Pulse 92   Temp 98.1 °F (36.7 °C)   Ht 177.8 cm (70\")   Wt 81.6 kg (180 lb)   SpO2 97%   BMI 25.83 kg/m²         Physical Exam  Vitals and nursing note reviewed.   Constitutional:       Appearance: He is well-developed. He is not diaphoretic.   HENT:      Head: Normocephalic and atraumatic.      Right Ear: External ear normal.   Eyes:      Conjunctiva/sclera: Conjunctivae normal.      Pupils: Pupils are equal, round, and reactive to light.   Cardiovascular:      Rate and Rhythm: Normal rate. Rhythm irregular.      Heart sounds: Normal heart sounds. No murmur heard.  Pulmonary:      Effort: Pulmonary effort is normal. No respiratory distress.      Breath sounds: Normal breath sounds.   Abdominal:      General: Bowel sounds are normal. There is no distension.      Palpations: Abdomen is soft.      Tenderness: There is no abdominal tenderness.   Musculoskeletal:         General: Tenderness present. No deformity.      Cervical back: Normal range of motion and neck supple.      Lumbar back: Spasms, tenderness and bony tenderness present. Decreased range of motion.   Skin:     General: Skin is warm.      Coloration: Skin is not pale.      Findings: No erythema or rash.   Neurological:      Mental Status: He is alert and oriented to person, place, and time.      Cranial Nerves: No cranial nerve deficit.   Psychiatric:         Behavior: Behavior normal.         [unfilled]   Diagnosis Plan   1. Essential hypertension  CBC Auto Differential    Comprehensive Metabolic Panel    Hemoglobin A1c    Lipid Panel    TSH Rfx On Abnormal To Free T4      2. Prediabetes  CBC Auto Differential    Comprehensive Metabolic Panel    Hemoglobin A1c    Lipid Panel    TSH Rfx On Abnormal To Free " T4      3. Stage 2 chronic kidney disease  CBC Auto Differential    Comprehensive Metabolic Panel    Hemoglobin A1c    Lipid Panel    TSH Rfx On Abnormal To Free T4      4. Vitamin D deficiency  CBC Auto Differential    Comprehensive Metabolic Panel    Hemoglobin A1c    Lipid Panel    TSH Rfx On Abnormal To Free T4      5. Mixed hyperlipidemia  CBC Auto Differential    Comprehensive Metabolic Panel    Hemoglobin A1c    Lipid Panel    TSH Rfx On Abnormal To Free T4      6. Gastroesophageal reflux disease with esophagitis without hemorrhage  CBC Auto Differential    Comprehensive Metabolic Panel    Hemoglobin A1c    Lipid Panel    TSH Rfx On Abnormal To Free T4      7. Gastritis without bleeding, unspecified chronicity, unspecified gastritis type  CBC Auto Differential    Comprehensive Metabolic Panel    Hemoglobin A1c    Lipid Panel    TSH Rfx On Abnormal To Free T4      8. Chronic midline low back pain, unspecified whether sciatica present  CBC Auto Differential    Comprehensive Metabolic Panel    Hemoglobin A1c    Lipid Panel    TSH Rfx On Abnormal To Free T4      9. Atrial fibrillation, persistent  CBC Auto Differential    Comprehensive Metabolic Panel    Hemoglobin A1c    Lipid Panel    TSH Rfx On Abnormal To Free T4      10. Benign prostatic hyperplasia with lower urinary tract symptoms, symptom details unspecified  CBC Auto Differential    Comprehensive Metabolic Panel    Hemoglobin A1c    Lipid Panel    TSH Rfx On Abnormal To Free T4    tamsulosin (Flomax) 0.4 MG capsule 24 hr capsule      11. History of back surgery  CBC Auto Differential    Comprehensive Metabolic Panel    Hemoglobin A1c    Lipid Panel    TSH Rfx On Abnormal To Free T4      12. Encounter for screening for endocrine disorder  CBC Auto Differential    Comprehensive Metabolic Panel    Hemoglobin A1c    Lipid Panel    TSH Rfx On Abnormal To Free T4      13. Gastroesophageal reflux disease, unspecified whether esophagitis present  famotidine  (Pepcid) 20 MG tablet      14. Allergic rhinitis, unspecified seasonality, unspecified trigger                    -recommend labwork   -allergic rhinitis  - start on flonase nasal spray daily along with singulair at bedtime   -Left shoulder pain/scapula pain -reviewed MRI of left shoulder Orthopedic following   -lower back pain/spondylosis of lumbar spine/lumbar stenosis /history of back surgery  - reviewed MRI of lumbar spine Pt failed PT/OT.Neurosurgery following and had surgery . Was on cymbalta and neurontin. Pt had recent surgery   -overweight -BMI at 26.46   -prediabetes - on metformin  mg daily   -CKD stage 2 -Nephrology following   -family history prostate cancer/difficulty urinating/BPH - recent PSA normal .recent US of prostate shows enlarged prostate . on flomax 0.4 m PO qhs. Will refer to Urology   -SVT/atrial fibrillation    on toprol XL XR 50 mg daily. On eliquis.   Cardiology following. Recent stress test was low risk study. HR elevated stopped  toprol XL 50 mg daily and switched to dilacor  mg daily. Drug information provided. Pt advised to make an appt with Dr. Quick   -[facility] - stop simvastatin  80 mg PO qhs recommend heart healthy diet start on lipitor 20 mg PO qhs   -HTN  - on micadis 40 PO q daily,  on dilacor  mg daily. Will cut back on micardis from 40 to 20 mg daily.  BP on lower side today. Advised pt to monitor blood pressure at home   -GERD/ with esophagitis/gastritis/history of esophageal stricture  -on pepcid 20 mg PO BID Pt had dilation. He is feeling better.    -vitamin D defiicency -vitamin D once a week   -advised pt to be safe and call with questions and concerns  -advised pt to go to ER or call 911 if symptoms worrisome or severe  -advised pt to followup with specialist and referrals  -advised pt to be safe during COVID-19 pandemic  I spent 34  minutes caring for Sergio on this date of service. This time includes time spent by me in the following activities: preparing  for the visit, reviewing tests, obtaining and/or reviewing a separately obtained history, performing a medically appropriate examination and/or evaluation, counseling and educating the patient/family/caregiver, ordering medications, tests, or procedures, referring and communicating with other health care professionals, documenting information in the medical record, independently interpreting results and communicating that information with the patient/family/caregiver and care coordination.   -recheck in 3 months         This document has been electronically signed by Oneil Jorgensen MD on May 24, 2023 08:35 CDT

## 2023-05-24 ENCOUNTER — OFFICE VISIT (OUTPATIENT)
Dept: FAMILY MEDICINE CLINIC | Facility: CLINIC | Age: 68
End: 2023-05-24
Payer: MEDICARE

## 2023-05-24 VITALS
HEIGHT: 70 IN | DIASTOLIC BLOOD PRESSURE: 72 MMHG | BODY MASS INDEX: 25.77 KG/M2 | TEMPERATURE: 98.1 F | HEART RATE: 92 BPM | SYSTOLIC BLOOD PRESSURE: 108 MMHG | OXYGEN SATURATION: 97 % | WEIGHT: 180 LBS

## 2023-05-24 DIAGNOSIS — R73.03 PREDIABETES: ICD-10-CM

## 2023-05-24 DIAGNOSIS — Z98.890 HISTORY OF BACK SURGERY: ICD-10-CM

## 2023-05-24 DIAGNOSIS — K21.9 GASTROESOPHAGEAL REFLUX DISEASE, UNSPECIFIED WHETHER ESOPHAGITIS PRESENT: ICD-10-CM

## 2023-05-24 DIAGNOSIS — I48.19 ATRIAL FIBRILLATION, PERSISTENT: ICD-10-CM

## 2023-05-24 DIAGNOSIS — Z13.29 ENCOUNTER FOR SCREENING FOR ENDOCRINE DISORDER: ICD-10-CM

## 2023-05-24 DIAGNOSIS — J30.9 ALLERGIC RHINITIS, UNSPECIFIED SEASONALITY, UNSPECIFIED TRIGGER: ICD-10-CM

## 2023-05-24 DIAGNOSIS — N18.2 STAGE 2 CHRONIC KIDNEY DISEASE: ICD-10-CM

## 2023-05-24 DIAGNOSIS — I10 ESSENTIAL HYPERTENSION: Primary | ICD-10-CM

## 2023-05-24 DIAGNOSIS — K29.70 GASTRITIS WITHOUT BLEEDING, UNSPECIFIED CHRONICITY, UNSPECIFIED GASTRITIS TYPE: ICD-10-CM

## 2023-05-24 DIAGNOSIS — E78.2 MIXED HYPERLIPIDEMIA: ICD-10-CM

## 2023-05-24 DIAGNOSIS — M54.50 CHRONIC MIDLINE LOW BACK PAIN, UNSPECIFIED WHETHER SCIATICA PRESENT: ICD-10-CM

## 2023-05-24 DIAGNOSIS — K21.00 GASTROESOPHAGEAL REFLUX DISEASE WITH ESOPHAGITIS WITHOUT HEMORRHAGE: ICD-10-CM

## 2023-05-24 DIAGNOSIS — N40.1 BENIGN PROSTATIC HYPERPLASIA WITH LOWER URINARY TRACT SYMPTOMS, SYMPTOM DETAILS UNSPECIFIED: ICD-10-CM

## 2023-05-24 DIAGNOSIS — E55.9 VITAMIN D DEFICIENCY: ICD-10-CM

## 2023-05-24 DIAGNOSIS — G89.29 CHRONIC MIDLINE LOW BACK PAIN, UNSPECIFIED WHETHER SCIATICA PRESENT: ICD-10-CM

## 2023-05-24 RX ORDER — TAMSULOSIN HYDROCHLORIDE 0.4 MG/1
1 CAPSULE ORAL DAILY
Qty: 90 CAPSULE | Refills: 1 | Status: SHIPPED | OUTPATIENT
Start: 2023-05-24

## 2023-05-24 RX ORDER — SUCRALFATE ORAL 1 G/10ML
1 SUSPENSION ORAL 4 TIMES DAILY
Qty: 414 ML | Refills: 3 | Status: SHIPPED | OUTPATIENT
Start: 2023-05-24

## 2023-05-24 RX ORDER — ATORVASTATIN CALCIUM 20 MG/1
20 TABLET, FILM COATED ORAL NIGHTLY
Qty: 90 TABLET | Refills: 1 | Status: SHIPPED | OUTPATIENT
Start: 2023-05-24

## 2023-05-24 RX ORDER — DILTIAZEM HYDROCHLORIDE 120 MG/1
120 CAPSULE, EXTENDED RELEASE ORAL DAILY
Qty: 90 CAPSULE | Refills: 1 | Status: SHIPPED | OUTPATIENT
Start: 2023-05-24

## 2023-05-24 RX ORDER — MONTELUKAST SODIUM 10 MG/1
10 TABLET ORAL NIGHTLY
Qty: 30 TABLET | Refills: 3 | Status: SHIPPED | OUTPATIENT
Start: 2023-05-24

## 2023-05-24 RX ORDER — FAMOTIDINE 20 MG/1
20 TABLET, FILM COATED ORAL 2 TIMES DAILY
Qty: 180 TABLET | Refills: 1 | Status: SHIPPED | OUTPATIENT
Start: 2023-05-24

## 2023-05-24 NOTE — PATIENT INSTRUCTIONS
Flonase  nasal spray daily  2 sprays in each nostril at bedtime    Singulair 10 mg at bedtime     Labwork sometime in next couple of months    Recheck in 3 months

## 2023-06-05 RX ORDER — TELMISARTAN 20 MG/1
20 TABLET ORAL DAILY
Qty: 90 TABLET | Refills: 1 | Status: SHIPPED | OUTPATIENT
Start: 2023-06-05

## 2023-06-08 ENCOUNTER — OFFICE VISIT (OUTPATIENT)
Dept: GASTROENTEROLOGY | Facility: CLINIC | Age: 68
End: 2023-06-08
Payer: MEDICARE

## 2023-06-08 ENCOUNTER — OFFICE VISIT (OUTPATIENT)
Dept: CARDIOLOGY | Facility: CLINIC | Age: 68
End: 2023-06-08
Payer: MEDICARE

## 2023-06-08 VITALS
TEMPERATURE: 97.1 F | SYSTOLIC BLOOD PRESSURE: 112 MMHG | HEART RATE: 102 BPM | DIASTOLIC BLOOD PRESSURE: 74 MMHG | OXYGEN SATURATION: 99 % | HEIGHT: 70 IN | BODY MASS INDEX: 25.62 KG/M2 | WEIGHT: 179 LBS

## 2023-06-08 VITALS
SYSTOLIC BLOOD PRESSURE: 120 MMHG | WEIGHT: 175 LBS | DIASTOLIC BLOOD PRESSURE: 72 MMHG | HEIGHT: 70 IN | BODY MASS INDEX: 25.05 KG/M2 | HEART RATE: 94 BPM

## 2023-06-08 DIAGNOSIS — I48.19 ATRIAL FIBRILLATION, PERSISTENT: Primary | ICD-10-CM

## 2023-06-08 DIAGNOSIS — K29.30 CHRONIC SUPERFICIAL GASTRITIS WITHOUT BLEEDING: ICD-10-CM

## 2023-06-08 DIAGNOSIS — K21.00 GASTROESOPHAGEAL REFLUX DISEASE WITH ESOPHAGITIS WITHOUT HEMORRHAGE: Primary | ICD-10-CM

## 2023-06-08 DIAGNOSIS — I10 ESSENTIAL HYPERTENSION: ICD-10-CM

## 2023-06-08 DIAGNOSIS — E78.2 MIXED HYPERLIPIDEMIA: ICD-10-CM

## 2023-06-08 PROCEDURE — 3074F SYST BP LT 130 MM HG: CPT | Performed by: NURSE PRACTITIONER

## 2023-06-08 PROCEDURE — 99213 OFFICE O/P EST LOW 20 MIN: CPT | Performed by: NURSE PRACTITIONER

## 2023-06-08 PROCEDURE — 3078F DIAST BP <80 MM HG: CPT | Performed by: INTERNAL MEDICINE

## 2023-06-08 PROCEDURE — 3078F DIAST BP <80 MM HG: CPT | Performed by: NURSE PRACTITIONER

## 2023-06-08 PROCEDURE — 1159F MED LIST DOCD IN RCRD: CPT | Performed by: INTERNAL MEDICINE

## 2023-06-08 PROCEDURE — 3074F SYST BP LT 130 MM HG: CPT | Performed by: INTERNAL MEDICINE

## 2023-06-08 PROCEDURE — 1160F RVW MEDS BY RX/DR IN RCRD: CPT | Performed by: INTERNAL MEDICINE

## 2023-06-08 PROCEDURE — 99214 OFFICE O/P EST MOD 30 MIN: CPT | Performed by: INTERNAL MEDICINE

## 2023-06-08 NOTE — PROGRESS NOTES
Sergio Sandoval  67 y.o. male      1. Atrial fibrillation, persistent    2. Essential hypertension    3. Mixed hyperlipidemia        History of Present Illness  Sergio Sandoval is a 67-year-old male who was seen by me for the first time back in September 2021.  He has a long and complex cardiac history.  It appears that he was diagnosed to have supraventricular tachycardia when he was stationed in [place] in the late 1990s.  He was treated with beta-blockers and later in 1998 while in [place] appears to have undergone ablation for either SVT or atrial fibrillation.  Subsequently he was noted to have atrial fibrillation and underwent cryoablation in 2012.  An MRI report from 2012 was available for review and the results are as follows:     1. Atrial fibrillation, status post pulmonary vein isolation.      2. Normal left ventricular chamber size and systolic function. LVEF      56%.      3. Normal right ventricular chamber size and systolic function.      4. No significant valvular abnormalities.      5. Normal pulmonary vein anatomy; left superior 14 mm, left inferior 15      mm, right superior 8 mm, right inferior 21 mm (including small      accessory vein).        His other medical issues include back pain, degenerative joint disease, hypertension, borderline diabetes/prediabetes, depression.  He has no previous documented coronary artery disease or valvular heart disease.    Lexiscan Cardiolite stress test in September 2021 showed:  Findings consistent with an equivocal ECG stress test.  Left ventricular ejection fraction is normal. (Calculated EF = 67%).  Myocardial perfusion imaging indicates a normal myocardial perfusion study with no evidence of ischemia.  Impressions are consistent with a low risk study.    Echocardiogram in October 2021 showed:  Left ventricular wall thickness is consistent with moderate concentric hypertrophy.  Estimated left ventricular EF = 57% Left ventricular ejection fraction appears to  be 56 - 60%. Left ventricular systolic function is normal.  Estimated right ventricular systolic pressure from tricuspid regurgitation is normal (<35 mmHg).    He has progressed reasonably well and denies any chest pain or shortness of breath.  He is rate controlled with underlying atrial fibrillation for the most part.  He indicates that his heart rate is usually between 70 and 100.    No Known Allergies      Past Medical History:   Diagnosis Date    Atrial fibrillation     CKD (chronic kidney disease)     DDD (degenerative disc disease), thoracic     Diabetes mellitus     prediabetes    GERD (gastroesophageal reflux disease)     Hyperlipidemia     Hypertension     Lipoma of skin          Past Surgical History:   Procedure Laterality Date    BACK SURGERY      L4-L5 fusion    CARDIAC ABLATION      x2    COLONOSCOPY      ENDOSCOPY      ENDOSCOPY N/A 2/15/2023    Procedure: ESOPHAGOGASTRODUODENOSCOPY possible dilation;  Surgeon: Edmund Mckeon MD;  Location: [facility] ENDOSCOPY;  Service: Gastroenterology;  Laterality: N/A;    SHOULDER SURGERY Bilateral     UPPER GASTROINTESTINAL ENDOSCOPY      with esophageal dilation    WISDOM TOOTH EXTRACTION           Family History   Problem Relation Age of Onset    Arthritis Mother     Heart disease Mother     Rheumatic fever Mother     Hyperlipidemia Father     Hypertension Father     Diabetes Father     Stroke Father     Prostate cancer Father     Skin cancer Father     Cancer Sister     Alcohol abuse Brother     No Known Problems Daughter     No Known Problems Son     Prostate cancer Paternal Grandfather     No Known Problems Sister          Social History     Socioeconomic History    Marital status:    Tobacco Use    Smoking status: Never    Smokeless tobacco: Never   Vaping Use    Vaping Use: Never used   Substance and Sexual Activity    Alcohol use: Not Currently    Drug use: Never    Sexual activity: Defer         Current Outpatient Medications   Medication Sig  Dispense Refill    Acetaminophen (TYLENOL 8 HOUR PO) Take 1,000 mg by mouth 3 (Three) Times a Day As Needed.      apixaban (ELIQUIS) 5 MG tablet tablet Take 1 tablet by mouth Every 12 (Twelve) Hours. 180 tablet 3    atorvastatin (Lipitor) 20 MG tablet Take 1 tablet by mouth Every Night. 90 tablet 1    dilTIAZem XR (DILACOR XR) 120 MG 24 hr capsule Take 1 capsule by mouth Daily. 90 capsule 1    docusate sodium (Colace) 100 MG capsule Take 1 capsule by mouth 2 (Two) Times a Day As Needed for Constipation. 60 capsule 3    famotidine (Pepcid) 20 MG tablet Take 1 tablet by mouth 2 (Two) Times a Day. 180 tablet 1    fluticasone (FLONASE) 50 MCG/ACT nasal spray 2 sprays into the nostril(s) as directed by provider Daily As Needed.      Glucosamine-Chondroit-Vit C-Mn (GLUCOSAMINE 1500 COMPLEX PO) Take 1,500 mg by mouth 2 (two) times a day.      magnesium citrate 1.745 GM/30ML solution solution Take 150 mL by mouth As Needed.      metFORMIN ER (GLUCOPHAGE-XR) 500 MG 24 hr tablet Take 1 tablet by mouth Daily With Breakfast. 30 tablet 3    methocarbamol (ROBAXIN) 750 MG tablet Take 1 tablet by mouth Every 8 (Eight) Hours As Needed.      montelukast (Singulair) 10 MG tablet Take 1 tablet by mouth Every Night. 30 tablet 3    Multiple Vitamins-Minerals (MULTIVITAMIN WITH MINERALS) tablet tablet Take 1 tablet by mouth Daily. GETS OTC      Saw Palmetto 450 MG capsule Take 1 capsule by mouth 2 (two) times a day. GETS OTC      sucralfate (Carafate) 1 GM/10ML suspension Take 10 mL by mouth 4 (Four) Times a Day. 414 mL 3    tamsulosin (Flomax) 0.4 MG capsule 24 hr capsule Take 1 capsule by mouth Daily. 90 capsule 1    telmisartan (Micardis) 20 MG tablet Take 1 tablet by mouth Daily. 90 tablet 1    VITAMIN D, ERGOCALCIFEROL, PO Take 1 capsule by mouth Daily. GETS OTC       No current facility-administered medications for this visit.         OBJECTIVE    /74 (BP Location: Left arm, Patient Position: Sitting, Cuff Size: Adult)    "Pulse 102   Temp 97.1 °F (36.2 °C)   Ht 177.8 cm (70\")   Wt 81.2 kg (179 lb)   SpO2 99%   BMI 25.68 kg/m²         Review of Systems: The following systems were reviewed and changes noted as indicated below    Constitutional:  Denies recent weight loss, weight gain, fever or chills     HENT:  Denies any hearing loss, epistaxis, hoarseness, or difficulty speaking.  Has postnasal drip and possible sinus problems.    Eyes: Wears eyeglasses or contact lenses     Respiratory: no cough, wheezing, or hemoptysis.     Cardiovascular: Elevated heart rate noted recently.  No chest pain or shortness of breath.    Gastrointestinal:  Denies change in bowel habits, dyspepsia, ulcer disease, hematochezia, or melena.     Endocrine: Negative for cold intolerance, heat intolerance, polydipsia, polyphagia and polyuria.     Genitourinary: Negative.      Musculoskeletal: DJD.  Chronic back pain    Neurological:  Denies any history of recurrent headaches, strokes, TIA, or seizure disorder.     Hematological: Denies any food allergies, seasonal allergies, bleeding disorders, or lymphadenopathy.  History of thrombocytopenia    Psychiatric/Behavioral: Denies any history of depression, substance abuse, or change in cognitive function.       Physical Exam: The following systems are reviewed and no changes noted    Constitutional: Cooperative, alert and oriented, well-developed, well-nourished, in no acute distress.     HENT:   Head: Normocephalic, normal hair patterns, no masses or tenderness.  Ears, Nose, and Throat: No gross abnormalities. No pallor or cyanosis. Dentition good.   Eyes: EOMS intact, PERRL, conjunctivae and lids unremarkable. Fundoscopic exam and visual fields not performed.   Neck: No palpable masses or adenopathy, no thyromegaly, no JVD, carotid pulses are full and equal bilaterally and without bruit.     Cardiovascular: irregular rhythm, S1 variable, S2 normal, no S3 or S4.  No murmurs, gallops, or rubs detected. "     Pulmonary/Chest: Chest: normal symmetry, normal respiratory excursion, no intercostal retraction, no use of accessory muscles.     Pulmonary: Normal breath sounds. No rales or rhonchi.    Abdominal: Abdomen soft, bowel sounds normoactive, no masses, no hepatosplenomegaly, nontender, no bruit.     Musculoskeletal: No deformities, clubbing, cyanosis, erythema, or edema observed.     Neurological: No gross motor or sensory deficits noted, affect appropriate, oriented to time, person, place.     Skin: Warm and dry to the touch, no apparent skin lesion or mass noted.     Psychiatric: He has a normal mood and affect. His behavior is normal. Judgment and thought content normal.         Procedures      Lab Results   Component Value Date    WBC 5.90 03/06/2023    HGB 13.8 03/06/2023    HCT 41.7 03/06/2023    MCV 90.5 03/06/2023     03/06/2023     Lab Results   Component Value Date    GLUCOSE 109 (H) 03/06/2023    BUN 13 03/06/2023    CREATININE 1.14 03/06/2023    EGFRIFNONA 73 01/24/2022    BCR 11.4 03/06/2023    CO2 27.0 03/06/2023    CALCIUM 9.1 03/06/2023    ALBUMIN 4.1 03/06/2023    AST 24 03/06/2023    ALT 18 03/06/2023     Lab Results   Component Value Date    CHOL 127 03/06/2023    CHOL 131 11/01/2022    CHOL 121 06/15/2022     Lab Results   Component Value Date    TRIG 57 03/06/2023    TRIG 41 11/01/2022    TRIG 54 06/15/2022     Lab Results   Component Value Date    HDL 52 03/06/2023    HDL 67 (H) 11/01/2022    HDL 57 06/15/2022     No components found for: LDLCALC  Lab Results   Component Value Date    LDL 63 03/06/2023    LDL 54 11/01/2022    LDL 52 06/15/2022     No results found for: HDLLDLRATIO  No components found for: CHOLHDL  Lab Results   Component Value Date    HGBA1C 5.80 (H) 03/06/2023     Lab Results   Component Value Date    TSH 0.518 08/31/2020           ASSESSMENT AND PLAN  Sergio Sandoval is a 67-year-old male with history of hypertension, hyperlipidemia, degenerative joint disease, cardiac  arrhythmias including supraventricular tachycardia in the remote past and currently atrial fibrillation which appears to be persistent and present at least since 1998.  He has failed 2 previous ablations for atrial fibrillation.   He is currently rate controlled with underlying atrial fibrillation.  No evidence of angina or congestive heart failure.    I have continued diltiazem  mg daily, anticoagulation with Eliquis, lipid-lowering therapy with simvastatin and antihypertensive therapy with telmisartan HCTZ.      Diagnoses and all orders for this visit:    1. Atrial fibrillation, persistent (Primary)    2. Essential hypertension    3. Mixed hyperlipidemia        Patient's Body mass index is 25.68 kg/m². indicating that he is overweight (BMI 25-29.9). Obesity-related health conditions include the following: hypertension, dyslipidemias and osteoarthritis. Obesity is unchanged. BMI is is above average; BMI management plan is completed. We discussed portion control and increasing exercise..      Sergio Brown Jaime  reports that he has never smoked. He has never used smokeless tobacco.      Rojas Higuera MD  6/8/2023  11:20 CDT

## 2023-06-08 NOTE — PROGRESS NOTES
Chief Complaint   Patient presents with    Heartburn    Difficulty Swallowing       Subjective    Sergio Sandoval is a 67 y.o. male. he is here today for follow-up.                                                                  Assessment & Plan                                     1. Gastroesophageal reflux disease with esophagitis without hemorrhage    2. Chronic superficial gastritis without bleeding      Plan; continue Pepcid daily Carafate as needed encouraged low acid diet avoidance of gastric irritants or any known triggers discussed with patient if dysphagia recurs or worsen we will plan esophageal manometry or repeat EGD.  Follow-up in 6 months for recheck sooner if needed    Follow-up: Return in about 6 months (around 12/8/2023) for Recheck.     HPI  67-year-old male presents for 3-month recheck regarding reflux and dysphagia.  Nephrology recommended he stop PPI as maintained on Pepcid and been cleared to take Carafate 2 times per day per nephrology.  States dysphagia still occurs with pills and certain intake but overall has done better EGD noted stricture which was dilated to 54 French esophagitis and gastritis.  [facility] which [facility] as [facility].  Recently has had increased nasal drainage seen by PCP who started him on Allegra reports he will get coughing spells then have hoarse voice afterwards.    Review of Systems  Review of Systems   Constitutional:  Negative for activity change, appetite change, chills, diaphoresis, fatigue, fever and unexpected weight change.   HENT:  Positive for trouble swallowing (with pills and feels like due to post nasal drainage recently) and voice change (voice becomes hoarse after coughing episodes). Negative for sore throat.    Respiratory:  Positive for cough. Negative for shortness of breath.    Gastrointestinal:  Negative for  "abdominal distention, abdominal pain, anal bleeding, blood in stool, constipation, diarrhea, nausea, rectal pain and vomiting.   Musculoskeletal:  Negative for arthralgias.   Skin:  Negative for pallor.   Neurological:  Negative for light-headedness.     /72 (BP Location: Left arm)   Pulse 94   Ht 177.8 cm (70\")   Wt 79.4 kg (175 lb)   BMI 25.11 kg/m²     Objective      Physical Exam  Constitutional:       General: He is not in acute distress.     Appearance: Normal appearance. He is normal weight. He is not ill-appearing or toxic-appearing.   HENT:      Head: Normocephalic and atraumatic.   Pulmonary:      Effort: Pulmonary effort is normal.   Abdominal:      General: Abdomen is flat. Bowel sounds are normal. There is no distension.      Palpations: Abdomen is soft. There is no mass.      Tenderness: There is abdominal tenderness in the epigastric area.   Neurological:      Mental Status: He is alert.             The following portions of the patient's history were reviewed and updated as appropriate:   Past Medical History:   Diagnosis Date    Atrial fibrillation     CKD (chronic kidney disease)     DDD (degenerative disc disease), thoracic     Diabetes mellitus     prediabetes    GERD (gastroesophageal reflux disease)     Hyperlipidemia     Hypertension     Lipoma of skin      Past Surgical History:   Procedure Laterality Date    BACK SURGERY      L4-L5 fusion    CARDIAC ABLATION      x2    COLONOSCOPY      ENDOSCOPY      ENDOSCOPY N/A 2/15/2023    Procedure: ESOPHAGOGASTRODUODENOSCOPY possible dilation;  Surgeon: Edmund Mckeon MD;  Location: [facility] ENDOSCOPY;  Service: Gastroenterology;  Laterality: N/A;    SHOULDER SURGERY Bilateral     UPPER GASTROINTESTINAL ENDOSCOPY      with esophageal dilation    WISDOM TOOTH EXTRACTION       Family History   Problem Relation Age of Onset    Arthritis Mother     Heart disease Mother     Rheumatic fever Mother     Hyperlipidemia Father     Hypertension Father  "    Diabetes Father     Stroke Father     Prostate cancer Father     Skin cancer Father     Cancer Sister     Alcohol abuse Brother     No Known Problems Daughter     No Known Problems Son     Prostate cancer Paternal Grandfather     No Known Problems Sister        No Known Allergies  Social History     Socioeconomic History    Marital status:    Tobacco Use    Smoking status: Never    Smokeless tobacco: Never   Vaping Use    Vaping Use: Never used   Substance and Sexual Activity    Alcohol use: Not Currently    Drug use: Never    Sexual activity: Defer     Current Medications:  Prior to Admission medications    Medication Sig Start Date End Date Taking? Authorizing Provider   Acetaminophen (TYLENOL 8 HOUR PO) Take 1,000 mg by mouth 3 (Three) Times a Day As Needed.   Yes Jorge Luis Thomas MD   apixaban (ELIQUIS) 5 MG tablet tablet Take 1 tablet by mouth Every 12 (Twelve) Hours. 8/5/22  Yes Oneil Jorgensen MD   atorvastatin (Lipitor) 20 MG tablet Take 1 tablet by mouth Every Night. 5/24/23  Yes Oneil Jorgensen MD   dilTIAZem XR (DILACOR XR) 120 MG 24 hr capsule Take 1 capsule by mouth Daily. 5/24/23  Yes Oneil Jorgensen MD   docusate sodium (Colace) 100 MG capsule Take 1 capsule by mouth 2 (Two) Times a Day As Needed for Constipation. 11/15/22  Yes Oneil Jorgensen MD   famotidine (Pepcid) 20 MG tablet Take 1 tablet by mouth 2 (Two) Times a Day. 5/24/23  Yes Oneil Jorgensen MD   fluticasone (FLONASE) 50 MCG/ACT nasal spray 2 sprays into the nostril(s) as directed by provider Daily As Needed.   Yes Jorge Luis Thomas MD   Glucosamine-Chondroit-Vit C-Mn (GLUCOSAMINE 1500 COMPLEX PO) Take 1,500 mg by mouth 2 (two) times a day.   Yes Jorge Luis Thomas MD   magnesium citrate 1.745 GM/30ML solution solution Take 150 mL by mouth As Needed.   Yes Jorge Luis Thomas MD   metFORMIN ER (GLUCOPHAGE-XR) 500 MG 24 hr tablet Take 1 tablet by mouth Daily With Breakfast. 1/18/23  Yes Oneil Jorgensen MD    methocarbamol (ROBAXIN) 750 MG tablet Take 1 tablet by mouth Every 8 (Eight) Hours As Needed. 12/29/22  Yes Jorge Luis Thomas MD   montelukast (Singulair) 10 MG tablet Take 1 tablet by mouth Every Night. 5/24/23  Yes Oneil Jorgensen MD   Multiple Vitamins-Minerals (MULTIVITAMIN WITH MINERALS) tablet tablet Take 1 tablet by mouth Daily. GETS OTC   Yes Jorge Luis Thomas MD   Saw Palmetto 450 MG capsule Take 1 capsule by mouth 2 (two) times a day. GETS OTC   Yes Jorge Luis Thomas MD   sucralfate (Carafate) 1 GM/10ML suspension Take 10 mL by mouth 4 (Four) Times a Day. 5/24/23  Yes Oneil Jorgensen MD   tamsulosin (Flomax) 0.4 MG capsule 24 hr capsule Take 1 capsule by mouth Daily. 5/24/23  Yes Oneil Jorgensen MD   telmisartan (Micardis) 20 MG tablet Take 1 tablet by mouth Daily. 6/5/23  Yes Oneil Jorgensen MD   VITAMIN D, ERGOCALCIFEROL, PO Take 1 capsule by mouth Daily. GETS OTC   Yes Jorge Luis Thomas MD     Orders placed during this encounter include:  No orders of the defined types were placed in this encounter.    * Surgery not found *  No orders of the defined types were placed in this encounter.        Review and/or summary of lab tests, radiology, procedures, medications. Review and summary of old records and obtaining of history. The risks and benefits of my recommendations, as well as other treatment options were discussed . Any questions/concerned were answered. Patient voiced understanding and agreement.          This document has been electronically signed by ERIN Marks on June 8, 2023 13:55 CDT                                               Results for orders placed or performed in visit on 03/06/23   CBC Auto Differential    Specimen: Blood   Result Value Ref Range    WBC 5.90 3.40 - 10.80 10*3/mm3    RBC 4.61 4.14 - 5.80 10*6/mm3    Hemoglobin 13.8 13.0 - 17.7 g/dL    Hematocrit 41.7 37.5 - 51.0 %    MCV 90.5 79.0 - 97.0 fL    MCH 29.9 26.6 - 33.0 pg    MCHC 33.1 31.5 - 35.7  g/dL    RDW 12.5 12.3 - 15.4 %    RDW-SD 40.8 37.0 - 54.0 fl    MPV 10.5 6.0 - 12.0 fL    Platelets 184 140 - 450 10*3/mm3    Neutrophil % 66.6 42.7 - 76.0 %    Lymphocyte % 23.7 19.6 - 45.3 %    Monocyte % 7.8 5.0 - 12.0 %    Eosinophil % 1.4 0.3 - 6.2 %    Basophil % 0.2 0.0 - 1.5 %    Immature Grans % 0.3 0.0 - 0.5 %    Neutrophils, Absolute 3.93 1.70 - 7.00 10*3/mm3    Lymphocytes, Absolute 1.40 0.70 - 3.10 10*3/mm3    Monocytes, Absolute 0.46 0.10 - 0.90 10*3/mm3    Eosinophils, Absolute 0.08 0.00 - 0.40 10*3/mm3    Basophils, Absolute 0.01 0.00 - 0.20 10*3/mm3    Immature Grans, Absolute 0.02 0.00 - 0.05 10*3/mm3    nRBC 0.0 0.0 - 0.2 /100 WBC   Hemoglobin A1c    Specimen: Blood   Result Value Ref Range    Hemoglobin A1C 5.80 (H) 4.80 - 5.60 %   Lipid Panel    Specimen: Blood   Result Value Ref Range    Total Cholesterol 127 0 - 200 mg/dL    Triglycerides 57 0 - 150 mg/dL    HDL Cholesterol 52 40 - 60 mg/dL    LDL Cholesterol  63 0 - 100 mg/dL    VLDL Cholesterol 12 5 - 40 mg/dL    LDL/HDL Ratio 1.22    Comprehensive Metabolic Panel    Specimen: Blood   Result Value Ref Range    Glucose 109 (H) 65 - 99 mg/dL    BUN 13 8 - 23 mg/dL    Creatinine 1.14 0.76 - 1.27 mg/dL    Sodium 143 136 - 145 mmol/L    Potassium 4.0 3.5 - 5.2 mmol/L    Chloride 108 (H) 98 - 107 mmol/L    CO2 27.0 22.0 - 29.0 mmol/L    Calcium 9.1 8.6 - 10.5 mg/dL    Total Protein 6.8 6.0 - 8.5 g/dL    Albumin 4.1 3.5 - 5.2 g/dL    ALT (SGPT) 18 1 - 41 U/L    AST (SGOT) 24 1 - 40 U/L    Alkaline Phosphatase 68 39 - 117 U/L    Total Bilirubin 0.4 0.0 - 1.2 mg/dL    Globulin 2.7 gm/dL    A/G Ratio 1.5 g/dL    BUN/Creatinine Ratio 11.4 7.0 - 25.0    Anion Gap 8.0 5.0 - 15.0 mmol/L    eGFR 70.9 >60.0 mL/min/1.73   Results for orders placed or performed during the hospital encounter of 02/15/23   TISSUE EXAM, [facility] ([facility])    Specimen: A: Gastric, Antrum; Tissue    B: Esophagus, Distal; Tissue   Result Value Ref Range    Reference Lab Report        [facility]  [address] [place] [address]  Phone: [phone] or [phone]  Fax: [phone]  Murali Coreas M.D., Medical Director    PATIENT NAME                           LABORATORY NO.  ANGEL JENSEN.                   [id number]  [id number]                         AGE              SEX  N           CLIENT REF #  [facility]           67      1955      [id number]   [id number]    [place]                       REQUESTING M.D.     ATTENDING M.D.     COPY TO[address]                 [place] [address]             DATE COLLECTED      DATE RECEIVED      DATE REPORTED  02/15/2023          02/15/2023         2023    DIAGNOSIS:  A.   ANTRUM, BIOPSY:  Reactive gastropathy  No Helicobacter pylori like organisms identified on H&E stained slide  No intestinal metaplasia or dysplasia identified  B.   ESOPHAGUS, BIOPSY, DISTAL:  Squamous mucosa with mild  reactive/reflux related changes  No increased intraepithelial eosinophils, intestinal metaplasia or  dysplasia identified    CLINICAL HISTORY:  Esophageal dysphagia, history of esophageal stricture, gastroesophageal reflux  disease with esophagitis without hemorrhage    SPECIMENS RECEIVED:  A.  ANTRUM, BIOPSY  B.  ESOPHAGUS, BIOPSY, DISTAL    MICROSCOPIC DESCRIPTION:  Tissue blocks are prepared and slides are examined microscopically on all  specimens. See diagnosis for details.    Professional interpretation rendered by Emily Woods D.O., F.C.A.P. at  [facility], [facility], [address] [place] [address].    GROSS DESCRIPTION:  A.  Labeled as antrum, consisting of 1 piece of tan soft tissue measuring 0.5 x  0.3 x 0.2 cm, submitted entirely in 1 cassette.  SOG  B.  Labeled as distal esophagus, consisting of 2 pieces of tan soft tissue  measuring 0.4 x 0.4 x 0.2 cm, submitted entirely in 1 cassette.    REVIEWED, DIAGNOSED AND  ELECTRONICALLY  SIGNED BY:    Emily Woods D.O.,  MARGARITA  CPT CODES:  88305x2     POC Glucose Once    Specimen: Blood   Result Value Ref Range    Glucose 91 70 - 130 mg/dL   Results for orders placed or performed in visit on 01/23/23   PSA Screen    Specimen: Blood   Result Value Ref Range    PSA 1.670 0.000 - 4.000 ng/mL   Urinalysis With Microscopic If Indicated (No Culture) - Urine, Clean Catch    Specimen: Urine, Clean Catch   Result Value Ref Range    Color, UA Yellow Yellow, Straw    Appearance, UA Clear Clear    pH, UA 7.0 5.0 - 8.0    Specific Gravity, UA 1.011 1.005 - 1.030    Glucose, UA Negative Negative    Ketones, UA Negative Negative    Bilirubin, UA Negative Negative    Blood, UA Negative Negative    Protein, UA Negative Negative    Leuk Esterase, UA Negative Negative    Nitrite, UA Negative Negative    Urobilinogen, UA 0.2 E.U./dL 0.2 - 1.0 E.U./dL   Results for orders placed or performed in visit on 12/01/22   ECG 12 Lead   Result Value Ref Range    QT Interval 360 ms    QTC Interval 418 ms   Results for orders placed or performed in visit on 11/01/22   CBC Auto Differential    Specimen: Blood   Result Value Ref Range    WBC 5.99 3.40 - 10.80 10*3/mm3    RBC 4.30 4.14 - 5.80 10*6/mm3    Hemoglobin 13.3 13.0 - 17.7 g/dL    Hematocrit 39.1 37.5 - 51.0 %    MCV 90.9 79.0 - 97.0 fL    MCH 30.9 26.6 - 33.0 pg    MCHC 34.0 31.5 - 35.7 g/dL    RDW 12.0 (L) 12.3 - 15.4 %    RDW-SD 40.2 37.0 - 54.0 fl    MPV 10.3 6.0 - 12.0 fL    Platelets 204 140 - 450 10*3/mm3    Neutrophil % 66.3 42.7 - 76.0 %    Lymphocyte % 24.0 19.6 - 45.3 %    Monocyte % 8.3 5.0 - 12.0 %    Eosinophil % 0.8 0.3 - 6.2 %    Basophil % 0.3 0.0 - 1.5 %    Immature Grans % 0.3 0.0 - 0.5 %    Neutrophils, Absolute 3.96 1.70 - 7.00 10*3/mm3    Lymphocytes, Absolute 1.44 0.70 - 3.10 10*3/mm3    Monocytes, Absolute 0.50 0.10 - 0.90 10*3/mm3    Eosinophils, Absolute 0.05 0.00 - 0.40 10*3/mm3    Basophils, Absolute 0.02 0.00 - 0.20  10*3/mm3    Immature Grans, Absolute 0.02 0.00 - 0.05 10*3/mm3    nRBC 0.0 0.0 - 0.2 /100 WBC   Vitamin D 25 Hydroxy    Specimen: Blood   Result Value Ref Range    25 Hydroxy, Vitamin D 51.5 30.0 - 100.0 ng/ml   Hemoglobin A1c    Specimen: Blood   Result Value Ref Range    Hemoglobin A1C 6.00 (H) 4.80 - 5.60 %   Vitamin B12    Specimen: Blood   Result Value Ref Range    Vitamin B-12 565 211 - 946 pg/mL   Lipid Panel    Specimen: Blood   Result Value Ref Range    Total Cholesterol 131 0 - 200 mg/dL    Triglycerides 41 0 - 150 mg/dL    HDL Cholesterol 67 (H) 40 - 60 mg/dL    LDL Cholesterol  54 0 - 100 mg/dL    VLDL Cholesterol 10 5 - 40 mg/dL    LDL/HDL Ratio 0.83      *Note: Due to a large number of results and/or encounters for the requested time period, some results have not been displayed. A complete set of results can be found in Results Review.

## 2023-07-25 RX ORDER — OMEPRAZOLE 40 MG/1
40 CAPSULE, DELAYED RELEASE ORAL DAILY
Qty: 30 CAPSULE | Refills: 3 | Status: SHIPPED | OUTPATIENT
Start: 2023-07-25

## 2023-08-01 NOTE — PROGRESS NOTES
Subjective:  Sergio Sandoval is a 68 y.o. male who presents for       Patient Active Problem List   Diagnosis    Mixed hyperlipidemia    Essential hypertension    Vitamin D deficiency    Gastroesophageal reflux disease    Benign prostatic hyperplasia with lower urinary tract symptoms    Benign prostatic hyperplasia with weak urinary stream    Prediabetes    Dizziness    Thrombocytopenia    Atrial fibrillation, controlled    Dyspnea on exertion    History of thrombocytopenia    History of prediabetes    Atrial fibrillation, persistent    Need for pneumococcal vaccine    Multiple joint pain    Chronic left shoulder pain    Chronic midline low back pain    Pain of left scapula    Left shoulder tendonitis    Spinal stenosis of lumbar region    DDD (degenerative disc disease), lumbar    Preoperative cardiovascular examination    Stage 2 chronic kidney disease    Overweight    Esophageal dysphagia    History of esophageal stricture    History of back surgery    Gastritis without bleeding    Esophageal stenosis    Allergic rhinitis           Current Outpatient Medications:     Acetaminophen (TYLENOL 8 HOUR PO), Take 1,000 mg by mouth 3 (Three) Times a Day As Needed., Disp: , Rfl:     apixaban (ELIQUIS) 5 MG tablet tablet, Take 1 tablet by mouth Every 12 (Twelve) Hours., Disp: 180 tablet, Rfl: 3    atorvastatin (Lipitor) 20 MG tablet, Take 1 tablet by mouth Every Night., Disp: 90 tablet, Rfl: 1    dilTIAZem XR (DILACOR XR) 120 MG 24 hr capsule, Take 1 capsule by mouth Daily., Disp: 90 capsule, Rfl: 1    docusate sodium (Colace) 100 MG capsule, Take 1 capsule by mouth 2 (Two) Times a Day As Needed for Constipation., Disp: 60 capsule, Rfl: 3    famotidine (Pepcid) 20 MG tablet, Take 1 tablet by mouth 2 (Two) Times a Day., Disp: 180 tablet, Rfl: 1    fluticasone (FLONASE) 50 MCG/ACT nasal spray, 2 sprays into the nostril(s) as directed by provider Daily As Needed., Disp: , Rfl:     Glucosamine-Chondroit-Vit C-Mn  (GLUCOSAMINE 1500 COMPLEX PO), Take 1,500 mg by mouth 2 (two) times a day., Disp: , Rfl:     magnesium citrate 1.745 GM/30ML solution solution, Take 150 mL by mouth As Needed., Disp: , Rfl:     metFORMIN ER (GLUCOPHAGE-XR) 500 MG 24 hr tablet, Take 1 tablet by mouth Daily With Breakfast., Disp: 90 tablet, Rfl: 1    methocarbamol (ROBAXIN) 750 MG tablet, Take 1 tablet by mouth Every 8 (Eight) Hours As Needed., Disp: , Rfl:     montelukast (Singulair) 10 MG tablet, Take 1 tablet by mouth Every Night., Disp: 30 tablet, Rfl: 3    Multiple Vitamins-Minerals (MULTIVITAMIN WITH MINERALS) tablet tablet, Take 1 tablet by mouth Daily. GETS OTC, Disp: , Rfl:     omeprazole (priLOSEC) 40 MG capsule, Take 1 capsule by mouth Daily., Disp: 30 capsule, Rfl: 3    Saw Palmetto 450 MG capsule, Take 1 capsule by mouth 2 (two) times a day. GETS OTC, Disp: , Rfl:     sucralfate (Carafate) 1 GM/10ML suspension, Take 10 mL by mouth 4 (Four) Times a Day., Disp: 414 mL, Rfl: 3    tamsulosin (Flomax) 0.4 MG capsule 24 hr capsule, Take 1 capsule by mouth Daily., Disp: 90 capsule, Rfl: 1    telmisartan (Micardis) 20 MG tablet, Take 1 tablet by mouth Daily., Disp: 90 tablet, Rfl: 1    VITAMIN D, ERGOCALCIFEROL, PO, Take 1 capsule by mouth Daily. GETS OTC, Disp: , Rfl:     Pt is 67 yo male with management of, GERD with esophagitis, gastritis, history of esophageal stenosis , HTN, HLP,  CAD, sp bilateral shoulder surgery, SVT, history of prediabetes, thrombocytopenia, atrial fibrillation sp ablation x 2 , CKD stage 2. History of COVID-19 infection, chronic left shoulder pain. Chronic lower back pain (DDD lumbar spine, mild spinal stenosis)     2/25/23 in office visit for recheck. Since last visit pt has had spine surgery performed by Dr. Crawford in [place]. Pt had EGD on 2/15/23 and was found to have benign intrinsic stenosis with dilation, gastritis and esophagitis. Tissue pathology showed reactive gastropathy. Pt saw Urology and had labwork on  1/23/23 with normal UA and PSA. BP and HR is stable today.  Pt is feeling better in regards to his lower back. He has less pain. He is doing home exercise.  He will see Dr. Crawford next  months.  He is only taking tylenol for pain.  His swallowing is better. His heart is stable. No chest pain no dizziness.      5/24/23 in office visit for recheck. Pt saw Nephrology on 3/13/23 for his CKD stage 3a. Also saw Orthopedic on 3/20/23 for his right shoulder pain and was given right shoulder injection. pth ad labwork on 3/6/23 that showed CBC normal lipid panel stable hga1c at 5.80 from 6.00 CMP showed GFR at 70.9 with normal liver enzymes.  Fasting sugars at 109. Pt is doing well overall. His back pain is stable and better. He has upcoming appt with Neurosurgery in June. No chest pain. He does have occasional dizziness when standing up. His BP has been stable. He is on micardis 20 mg daily and dilacor 120 mg daily.  He is having allergy issues and postnasal drip. He is using flonase every other day and benadryl at bedtime. He does cough at times     8/24/23 in office visit for recheck  . Pt saw his back surgeron on 6/22/23.  Pt also saw GI and Cardiology on 6/8/23 and he contineus to take diltiazem  mg daily.  Pt had labwork on 8/7/23 that showed Hga1c at 6.10 from 5.80 continue watching sugar and carb intake Kidney function stable, thyroid studies stable. Pt continues to take his medications for GERD, a fib, HTN HLP. His HR is at 102 BP stable  he does exhibit dry cough in morning and afternoon. Pt reports no chest pain no dizziness.   He does suffer allergies and is not using his nasal spray as often.              Heartburn  He complains of abdominal pain and coughing. He reports no belching, no chest pain, no choking, no dysphagia, no early satiety, no globus sensation, no heartburn, no hoarse voice, no nausea, no sore throat, no stridor, no tooth decay, no water brash or no wheezing. This is a recurrent problem.  The current episode started more than 1 month ago. The problem occurs constantly. Nothing aggravates the symptoms. Associated symptoms include fatigue. He has tried an antacid for the symptoms. The treatment provided no relief. Past procedures do not include an abdominal ultrasound, an EGD, esophageal manometry, H. pylori antibody titer or a UGI. Past invasive treatments do not include gastroplasty, gastroplication or reflux surgery.   Atrial Fibrillation  Presents for follow-up visit. Symptoms include weakness. Symptoms are negative for an AICD problem, bradycardia, chest pain, dizziness, hypertension, hypotension, pacemaker problem, palpitations, shortness of breath, syncope and tachycardia. The symptoms have been stable. Past medical history includes atrial fibrillation.   Back Pain  This is a chronic problem. The current episode started more than 1 month ago. The problem occurs rarely The pain is present in the gluteal and lumbar spine. The quality of the pain is described as aching. The pain is at a severity of 2/10. The symptoms are aggravated by bending and position. Stiffness is present all day. Associated symptoms include dysuria, numbness and weakness. Pertinent negatives include no abdominal pain, bladder incontinence, bowel incontinence, chest pain, fever, headaches, leg pain, paresis, paresthesias, pelvic pain, perianal numbness, tingling or weight loss. He has tried NSAIDs (neurontin cymbalta,back surgery  ) for the symptoms. The treatment provided significant  relief.   Shoulder Injury   The incident occurred at home. The left shoulder is affected. The incident occurred more than 1 week ago. There was no injury mechanism. The quality of the pain is described as shooting. Associated symptoms include numbness. Pertinent negatives include no chest pain or tingling. The symptoms are aggravated by movement, overhead lifting and palpation. Treatments tried: neurontin. The treatment provided no relief.    Chronic Kidney Disease  This is a chronic problem. The current episode started more than 1 year ago. The problem occurs constantly. The problem has been unchanged. Associated symptoms include arthralgias. Pertinent negatives include no abdominal pain, anorexia, change in bowel habit, chest pain, chills, congestion, coughing, fatigue, fever, headaches, joint swelling, myalgias, nausea, neck pain, numbness, sore throat, swollen glands, urinary symptoms, vertigo, visual change, vomiting or weakness. Nothing aggravates the symptoms. He has tried nothing for the symptoms. The treatment provided no relief.   Heart Problem  This is a chronic problem. The current episode started more than 1 year ago. The problem occurs constantly. The problem has been unchanged. Associated symptoms include arthralgias and fatigue. Pertinent negatives include no abdominal pain, anorexia, change in bowel habit, chest pain, chills, congestion, coughing, diaphoresis, fever, headaches, joint swelling, myalgias, nausea, neck pain, numbness, sore throat, swollen glands, urinary symptoms, vertigo, visual change, vomiting or weakness. The symptoms are aggravated by bending. Treatments tried: flecanaide  The treatment provided significant relief.   Hypertension   This is a chronic problem. The current episode started more than 1 month ago. The problem is unchanged. The problem is uncontrolled. Pertinent negatives include no anxiety, blurred vision, chest pain, headaches, malaise/fatigue, neck pain, orthopnea, palpitations, peripheral edema, PND, shortness of breath or sweats. Risk factors for coronary artery disease include male gender and dyslipidemia. Past treatments include beta blockers, angiotensin blockers and diuretics. Current antihypertension treatment includes beta blockers, angiotensin blockers and diuretics. The current treatment provides moderate improvement. There are no compliance problems.  There is no history of angina, kidney  disease, CAD/MI, CVA, heart failure, left ventricular hypertrophy, PVD or retinopathy. There is no history of chronic renal disease, coarctation of the aorta, hyperaldosteronism, hypercortisolism, hyperparathyroidism, a hypertension causing med, pheochromocytoma, renovascular disease, sleep apnea or a thyroid problem.   Hyperlipidemia   This is a chronic problem. The current episode started more than 1 year ago. He has no history of chronic renal disease. Pertinent negatives include no chest pain or shortness of breath. The current treatment provides mild improvement of lipids. There are no compliance problems.     Male  Problem   The patient's pertinent negatives include no genital injury, genital itching, genital lesions, pelvic pain, penile discharge, penile pain, priapism, scrotal swelling or testicular pain. Primary symptoms comment: difficulty urinating. This is a new problem. The problem occurs constantly. The problem has been improving  Pertinent negatives include no abdominal pain, anorexia, chest pain, chills, constipation, coughing, diarrhea, discolored urine, dysuria, fever, flank pain, frequency, headaches, hematuria, hesitancy, joint pain, joint swelling, nausea, painful intercourse, rash, shortness of breath, urgency, urinary retention or vomiting. Nothing aggravates the symptoms. He has tried nothing for the symptoms. The treatment provided no relief. He is sexually active. There is no history of chlamydia, cryptorchidism, erectile aid use, erectile dysfunction, a femoral hernia, gonorrhea, herpes simplex, HIV, an inguinal hernia, kidney stones, prostatitis, sickle cell disease, syphilis or varicocele. Has tried flomax and had significant relief     Review of Systems  Review of Systems   Constitutional:  Positive for activity change and fatigue. Negative for appetite change, chills, diaphoresis and fever.   HENT:  Negative for congestion, hoarse voice, postnasal drip, rhinorrhea, sinus pressure,  sinus pain, sneezing, sore throat, trouble swallowing and voice change.    Respiratory:  Positive for cough. Negative for choking, chest tightness, shortness of breath, wheezing and stridor.    Cardiovascular:  Negative for chest pain.   Gastrointestinal:  Positive for abdominal pain. Negative for diarrhea, dysphagia, heartburn, nausea and vomiting.   Musculoskeletal:  Positive for arthralgias.   Allergic/Immunologic: Positive for environmental allergies.   Neurological:  Positive for weakness and numbness. Negative for headaches.   Hematological:  Positive for adenopathy.     Patient Active Problem List   Diagnosis    Mixed hyperlipidemia    Essential hypertension    Vitamin D deficiency    Gastroesophageal reflux disease    Benign prostatic hyperplasia with lower urinary tract symptoms    Benign prostatic hyperplasia with weak urinary stream    Prediabetes    Dizziness    Thrombocytopenia    Atrial fibrillation, controlled    Dyspnea on exertion    History of thrombocytopenia    History of prediabetes    Atrial fibrillation, persistent    Need for pneumococcal vaccine    Multiple joint pain    Chronic left shoulder pain    Chronic midline low back pain    Pain of left scapula    Left shoulder tendonitis    Spinal stenosis of lumbar region    DDD (degenerative disc disease), lumbar    Preoperative cardiovascular examination    Stage 2 chronic kidney disease    Overweight    Esophageal dysphagia    History of esophageal stricture    History of back surgery    Gastritis without bleeding    Esophageal stenosis    Allergic rhinitis     Past Surgical History:   Procedure Laterality Date    BACK SURGERY      L4-L5 fusion    CARDIAC ABLATION      x2    COLONOSCOPY      ENDOSCOPY      ENDOSCOPY N/A 2/15/2023    Procedure: ESOPHAGOGASTRODUODENOSCOPY possible dilation;  Surgeon: Edmund Mckeon MD;  Location: [facility] ENDOSCOPY;  Service: Gastroenterology;  Laterality: N/A;    SHOULDER SURGERY Bilateral     UPPER  GASTROINTESTINAL ENDOSCOPY      with esophageal dilation    WISDOM TOOTH EXTRACTION       Social History     Socioeconomic History    Marital status:    Tobacco Use    Smoking status: Never    Smokeless tobacco: Never   Vaping Use    Vaping Use: Never used   Substance and Sexual Activity    Alcohol use: Not Currently    Drug use: Never    Sexual activity: Defer     Family History   Problem Relation Age of Onset    Arthritis Mother     Heart disease Mother     Rheumatic fever Mother     Hyperlipidemia Father     Hypertension Father     Diabetes Father     Stroke Father     Prostate cancer Father     Skin cancer Father     Cancer Sister     Alcohol abuse Brother     No Known Problems Daughter     No Known Problems Son     Prostate cancer Paternal Grandfather     No Known Problems Sister      Lab on 08/07/2023   Component Date Value Ref Range Status    WBC 08/07/2023 8.54  3.40 - 10.80 10*3/mm3 Final    RBC 08/07/2023 4.24  4.14 - 5.80 10*6/mm3 Final    Hemoglobin 08/07/2023 13.4  13.0 - 17.7 g/dL Final    Hematocrit 08/07/2023 39.2  37.5 - 51.0 % Final    MCV 08/07/2023 92.5  79.0 - 97.0 fL Final    MCH 08/07/2023 31.6  26.6 - 33.0 pg Final    MCHC 08/07/2023 34.2  31.5 - 35.7 g/dL Final    RDW 08/07/2023 12.2 (L)  12.3 - 15.4 % Final    RDW-SD 08/07/2023 40.3  37.0 - 54.0 fl Final    MPV 08/07/2023 10.5  6.0 - 12.0 fL Final    Platelets 08/07/2023 173  140 - 450 10*3/mm3 Final    Neutrophil % 08/07/2023 77.5 (H)  42.7 - 76.0 % Final    Lymphocyte % 08/07/2023 14.3 (L)  19.6 - 45.3 % Final    Monocyte % 08/07/2023 6.8  5.0 - 12.0 % Final    Eosinophil % 08/07/2023 0.6  0.3 - 6.2 % Final    Basophil % 08/07/2023 0.4  0.0 - 1.5 % Final    Immature Grans % 08/07/2023 0.4  0.0 - 0.5 % Final    Neutrophils, Absolute 08/07/2023 6.63  1.70 - 7.00 10*3/mm3 Final    Lymphocytes, Absolute 08/07/2023 1.22  0.70 - 3.10 10*3/mm3 Final    Monocytes, Absolute 08/07/2023 0.58  0.10 - 0.90 10*3/mm3 Final    Eosinophils,  Absolute 08/07/2023 0.05  0.00 - 0.40 10*3/mm3 Final    Basophils, Absolute 08/07/2023 0.03  0.00 - 0.20 10*3/mm3 Final    Immature Grans, Absolute 08/07/2023 0.03  0.00 - 0.05 10*3/mm3 Final    nRBC 08/07/2023 0.1  0.0 - 0.2 /100 WBC Final    Glucose 08/07/2023 99  65 - 99 mg/dL Final    BUN 08/07/2023 9  8 - 23 mg/dL Final    Creatinine 08/07/2023 1.05  0.76 - 1.27 mg/dL Final    Sodium 08/07/2023 142  136 - 145 mmol/L Final    Potassium 08/07/2023 4.1  3.5 - 5.2 mmol/L Final    Chloride 08/07/2023 107  98 - 107 mmol/L Final    CO2 08/07/2023 24.0  22.0 - 29.0 mmol/L Final    Calcium 08/07/2023 9.1  8.6 - 10.5 mg/dL Final    Total Protein 08/07/2023 6.2  6.0 - 8.5 g/dL Final    Albumin 08/07/2023 4.2  3.5 - 5.2 g/dL Final    ALT (SGPT) 08/07/2023 17  1 - 41 U/L Final    AST (SGOT) 08/07/2023 19  1 - 40 U/L Final    Alkaline Phosphatase 08/07/2023 67  39 - 117 U/L Final    Total Bilirubin 08/07/2023 0.5  0.0 - 1.2 mg/dL Final    Globulin 08/07/2023 2.0  gm/dL Final    A/G Ratio 08/07/2023 2.1  g/dL Final    BUN/Creatinine Ratio 08/07/2023 8.6  7.0 - 25.0 Final    Anion Gap 08/07/2023 11.0  5.0 - 15.0 mmol/L Final    eGFR 08/07/2023 77.8  >60.0 mL/min/1.73 Final    Hemoglobin A1C 08/07/2023 6.10 (H)  4.80 - 5.60 % Final    Total Cholesterol 08/07/2023 130  0 - 200 mg/dL Final    Triglycerides 08/07/2023 57  0 - 150 mg/dL Final    HDL Cholesterol 08/07/2023 59  40 - 60 mg/dL Final    LDL Cholesterol  08/07/2023 59  0 - 100 mg/dL Final    VLDL Cholesterol 08/07/2023 12  5 - 40 mg/dL Final    LDL/HDL Ratio 08/07/2023 1.01   Final    TSH 08/07/2023 1.590  0.270 - 4.200 uIU/mL Final   Lab on 03/06/2023   Component Date Value Ref Range Status    WBC 03/06/2023 5.90  3.40 - 10.80 10*3/mm3 Final    RBC 03/06/2023 4.61  4.14 - 5.80 10*6/mm3 Final    Hemoglobin 03/06/2023 13.8  13.0 - 17.7 g/dL Final    Hematocrit 03/06/2023 41.7  37.5 - 51.0 % Final    MCV 03/06/2023 90.5  79.0 - 97.0 fL Final    MCH 03/06/2023 29.9  26.6 -  33.0 pg Final    MCHC 03/06/2023 33.1  31.5 - 35.7 g/dL Final    RDW 03/06/2023 12.5  12.3 - 15.4 % Final    RDW-SD 03/06/2023 40.8  37.0 - 54.0 fl Final    MPV 03/06/2023 10.5  6.0 - 12.0 fL Final    Platelets 03/06/2023 184  140 - 450 10*3/mm3 Final    Neutrophil % 03/06/2023 66.6  42.7 - 76.0 % Final    Lymphocyte % 03/06/2023 23.7  19.6 - 45.3 % Final    Monocyte % 03/06/2023 7.8  5.0 - 12.0 % Final    Eosinophil % 03/06/2023 1.4  0.3 - 6.2 % Final    Basophil % 03/06/2023 0.2  0.0 - 1.5 % Final    Immature Grans % 03/06/2023 0.3  0.0 - 0.5 % Final    Neutrophils, Absolute 03/06/2023 3.93  1.70 - 7.00 10*3/mm3 Final    Lymphocytes, Absolute 03/06/2023 1.40  0.70 - 3.10 10*3/mm3 Final    Monocytes, Absolute 03/06/2023 0.46  0.10 - 0.90 10*3/mm3 Final    Eosinophils, Absolute 03/06/2023 0.08  0.00 - 0.40 10*3/mm3 Final    Basophils, Absolute 03/06/2023 0.01  0.00 - 0.20 10*3/mm3 Final    Immature Grans, Absolute 03/06/2023 0.02  0.00 - 0.05 10*3/mm3 Final    nRBC 03/06/2023 0.0  0.0 - 0.2 /100 WBC Final    Glucose 03/06/2023 109 (H)  65 - 99 mg/dL Final    BUN 03/06/2023 13  8 - 23 mg/dL Final    Creatinine 03/06/2023 1.14  0.76 - 1.27 mg/dL Final    Sodium 03/06/2023 143  136 - 145 mmol/L Final    Potassium 03/06/2023 4.0  3.5 - 5.2 mmol/L Final    Chloride 03/06/2023 108 (H)  98 - 107 mmol/L Final    CO2 03/06/2023 27.0  22.0 - 29.0 mmol/L Final    Calcium 03/06/2023 9.1  8.6 - 10.5 mg/dL Final    Total Protein 03/06/2023 6.8  6.0 - 8.5 g/dL Final    Albumin 03/06/2023 4.1  3.5 - 5.2 g/dL Final    ALT (SGPT) 03/06/2023 18  1 - 41 U/L Final    AST (SGOT) 03/06/2023 24  1 - 40 U/L Final    Alkaline Phosphatase 03/06/2023 68  39 - 117 U/L Final    Total Bilirubin 03/06/2023 0.4  0.0 - 1.2 mg/dL Final    Globulin 03/06/2023 2.7  gm/dL Final    A/G Ratio 03/06/2023 1.5  g/dL Final    BUN/Creatinine Ratio 03/06/2023 11.4  7.0 - 25.0 Final    Anion Gap 03/06/2023 8.0  5.0 - 15.0 mmol/L Final    eGFR 03/06/2023 70.9   ">60.0 mL/min/1.73 Final    Hemoglobin A1C 03/06/2023 5.80 (H)  4.80 - 5.60 % Final    Total Cholesterol 03/06/2023 127  0 - 200 mg/dL Final    Triglycerides 03/06/2023 57  0 - 150 mg/dL Final    HDL Cholesterol 03/06/2023 52  40 - 60 mg/dL Final    LDL Cholesterol  03/06/2023 63  0 - 100 mg/dL Final    VLDL Cholesterol 03/06/2023 12  5 - 40 mg/dL Final    LDL/HDL Ratio 03/06/2023 1.22   Final      XR Shoulder 2+ View Right  Narrative: EXAM: XR SHOULDER 2 OR MORE VIEWS RIGHT    HISTORY: pain, M25.511 Pain in right shoulder.    COMPARISON: None    FINDINGS:    Mineralization: Osseous demineralization    Bones: Glenohumeral joint space narrowing. Small humeral  osteophytes. Small pseudocysts lateral humeral head. Prior  resection distal clavicle.    Soft tissues: Normal  Impression: Glenohumeral osteoarthritis    Electronically signed by:  Erick Patricio DO  11/29/2022 9:51 AM  [facility] Workstation: [id number]    [email or web address]  Immunization History   Administered Date(s) Administered    COVID-19 (MODERNA) 1st,2nd,3rd Dose Monovalent 01/06/2021, 02/03/2021    Fluzone >6mos 09/15/2020    Fluzone High-Dose 65+yrs 09/29/2021, 10/19/2022    Pneumococcal Polysaccharide (PPSV23) 10/28/2021       The following portions of the patient's history were reviewed and updated as appropriate: allergies, current medications, past family history, past medical history, past social history, past surgical history and problem list.        Physical Exam   /74 (BP Location: Left arm, Patient Position: Sitting, Cuff Size: Adult)   Pulse 102   Temp 98 øF (36.7 øC) (Oral)   Ht 177.8 cm (70\")   Wt 76.7 kg (169 lb)   SpO2 97%   BMI 24.25 kg/mý     /74 (BP Location: Left arm, Patient Position: Sitting, Cuff Size: Adult)   Pulse 102   Temp 98 øF (36.7 øC) (Oral)   Ht 177.8 cm (70\")   Wt 76.7 kg (169 lb)   SpO2 97%   BMI 24.25 kg/mý         Physical Exam  Vitals and nursing note reviewed.   Constitutional:       Appearance: He is " well-developed. He is not diaphoretic.   HENT:      Head: Normocephalic and atraumatic.      Right Ear: External ear normal.   Eyes:      Conjunctiva/sclera: Conjunctivae normal.      Pupils: Pupils are equal, round, and reactive to light.   Cardiovascular:      Rate and Rhythm: Normal rate. Rhythm irregular.      Heart sounds: Normal heart sounds. No murmur heard.  Pulmonary:      Effort: Pulmonary effort is normal. No respiratory distress.      Breath sounds: Normal breath sounds.   Abdominal:      General: Bowel sounds are normal. There is no distension.      Palpations: Abdomen is soft.      Tenderness: There is no abdominal tenderness.   Musculoskeletal:         General: Tenderness present. No deformity.      Cervical back: Normal range of motion and neck supple.      Lumbar back: Spasms, tenderness and bony tenderness present. Decreased range of motion.   Skin:     General: Skin is warm.      Coloration: Skin is not pale.      Findings: No erythema or rash.   Neurological:      Mental Status: He is alert and oriented to person, place, and time.      Cranial Nerves: No cranial nerve deficit.   Psychiatric:         Behavior: Behavior normal.       [unfilled]   Diagnosis Plan   1. Atrial fibrillation, persistent  CBC Auto Differential    Comprehensive Metabolic Panel    Hemoglobin A1c    Lipid Panel    Vitamin D,25-Hydroxy    Vitamin B12      2. Essential hypertension  CBC Auto Differential    Comprehensive Metabolic Panel    Hemoglobin A1c    Lipid Panel    Vitamin D,25-Hydroxy    Vitamin B12      3. Mixed hyperlipidemia  CBC Auto Differential    Comprehensive Metabolic Panel    Hemoglobin A1c    Lipid Panel    Vitamin D,25-Hydroxy    Vitamin B12      4. History of thrombocytopenia  CBC Auto Differential    Comprehensive Metabolic Panel    Hemoglobin A1c    Lipid Panel    Vitamin D,25-Hydroxy    Vitamin B12      5. History of prediabetes  CBC Auto Differential    Comprehensive Metabolic Panel    Hemoglobin  A1c    Lipid Panel    Vitamin D,25-Hydroxy    Vitamin B12      6. Vitamin D deficiency  CBC Auto Differential    Comprehensive Metabolic Panel    Hemoglobin A1c    Lipid Panel    Vitamin D,25-Hydroxy    Vitamin B12      7. Gastroesophageal reflux disease with esophagitis without hemorrhage  CBC Auto Differential    Comprehensive Metabolic Panel    Hemoglobin A1c    Lipid Panel    Vitamin D,25-Hydroxy    Vitamin B12      8. Gastritis without bleeding, unspecified chronicity, unspecified gastritis type  CBC Auto Differential    Comprehensive Metabolic Panel    Hemoglobin A1c    Lipid Panel    Vitamin D,25-Hydroxy    Vitamin B12      9. Stage 2 chronic kidney disease  CBC Auto Differential    Comprehensive Metabolic Panel    Hemoglobin A1c    Lipid Panel    Vitamin D,25-Hydroxy    Vitamin B12      10. History of back surgery  CBC Auto Differential    Comprehensive Metabolic Panel    Hemoglobin A1c    Lipid Panel    Vitamin D,25-Hydroxy    Vitamin B12      11. Benign prostatic hyperplasia with lower urinary tract symptoms, symptom details unspecified  CBC Auto Differential    Comprehensive Metabolic Panel    Hemoglobin A1c    Lipid Panel    Vitamin D,25-Hydroxy    Vitamin B12                    -went over labwork   -allergic rhinitis  - on flonase nasal spray daily along with singulair at bedtime   -Left shoulder pain/scapula pain -reviewed MRI of left shoulder Orthopedic following   -lower back pain/spondylosis of lumbar spine/lumbar stenosis /history of back surgery  - reviewed MRI of lumbar spine Pt failed PT/OT.Neurosurgery following and had surgery . Was on cymbalta and neurontin. Pt had recent surgery   -overweight -BMI at 26.46   -prediabetes - on metformin  mg daily   -CKD stage 2 -Nephrology following   -family history prostate cancer/difficulty urinating/BPH - recent PSA normal .recent US of prostate shows enlarged prostate . on flomax 0.4 m PO qhs. Will refer to Urology   -SVT/atrial fibrillation    on  toprol XL XR 50 mg daily. On eliquis.   Cardiology following. Recent stress test was low risk study. HR elevated stopped  toprol XL 50 mg daily and switched to dilacor  mg daily. Drug information provided. Pt advised to make an appt with Dr. Quick   -P - stop simvastatin  80 mg PO qhs recommend heart healthy diet start on lipitor 20 mg PO qhs   -HTN  - on micadis 40 PO q daily,  on dilacor  mg daily. Will cut back on micardis from 40 to 20 mg daily.  BP on lower side today. Advised pt to monitor blood pressure at home   -GERD/ with esophagitis/gastritis/history of esophageal stricture  -on pepcid 20 mg PO BID Pt had dilation. He is feeling better.    -vitamin D defiicency -vitamin D once a week   -advised pt to be safe and call with questions and concerns  -advised pt to go to ER or call 911 if symptoms worrisome or severe  -advised pt to followup with specialist and referrals  -advised pt to be safe during COVID-19 pandemic  I spent 36  minutes caring for Sergio on this date of service. This time includes time spent by me in the following activities: preparing for the visit, reviewing tests, obtaining and/or reviewing a separately obtained history, performing a medically appropriate examination and/or evaluation, counseling and educating the patient/family/caregiver, ordering medications, tests, or procedures, referring and communicating with other health care professionals, documenting information in the medical record, independently interpreting results and communicating that information with the patient/family/caregiver and care coordination.   -recheck in 3 months        This document has been electronically signed by Oneil Jorgensen MD on August 24, 2023 08:16 CDT

## 2023-08-07 ENCOUNTER — LAB (OUTPATIENT)
Dept: LAB | Facility: HOSPITAL | Age: 68
End: 2023-08-07
Payer: MEDICARE

## 2023-08-07 DIAGNOSIS — E78.2 MIXED HYPERLIPIDEMIA: ICD-10-CM

## 2023-08-07 DIAGNOSIS — I48.19 ATRIAL FIBRILLATION, PERSISTENT: ICD-10-CM

## 2023-08-07 DIAGNOSIS — N18.2 STAGE 2 CHRONIC KIDNEY DISEASE: ICD-10-CM

## 2023-08-07 DIAGNOSIS — K21.00 GASTROESOPHAGEAL REFLUX DISEASE WITH ESOPHAGITIS WITHOUT HEMORRHAGE: ICD-10-CM

## 2023-08-07 DIAGNOSIS — Z98.890 HISTORY OF BACK SURGERY: ICD-10-CM

## 2023-08-07 DIAGNOSIS — N40.1 BENIGN PROSTATIC HYPERPLASIA WITH LOWER URINARY TRACT SYMPTOMS, SYMPTOM DETAILS UNSPECIFIED: ICD-10-CM

## 2023-08-07 DIAGNOSIS — E55.9 VITAMIN D DEFICIENCY: ICD-10-CM

## 2023-08-07 DIAGNOSIS — G89.29 CHRONIC MIDLINE LOW BACK PAIN, UNSPECIFIED WHETHER SCIATICA PRESENT: ICD-10-CM

## 2023-08-07 DIAGNOSIS — K29.70 GASTRITIS WITHOUT BLEEDING, UNSPECIFIED CHRONICITY, UNSPECIFIED GASTRITIS TYPE: ICD-10-CM

## 2023-08-07 DIAGNOSIS — I10 ESSENTIAL HYPERTENSION: ICD-10-CM

## 2023-08-07 DIAGNOSIS — R73.03 PREDIABETES: ICD-10-CM

## 2023-08-07 DIAGNOSIS — Z13.29 ENCOUNTER FOR SCREENING FOR ENDOCRINE DISORDER: ICD-10-CM

## 2023-08-07 DIAGNOSIS — M54.50 CHRONIC MIDLINE LOW BACK PAIN, UNSPECIFIED WHETHER SCIATICA PRESENT: ICD-10-CM

## 2023-08-07 LAB
ALBUMIN SERPL-MCNC: 4.2 G/DL (ref 3.5–5.2)
ALBUMIN/GLOB SERPL: 2.1 G/DL
ALP SERPL-CCNC: 67 U/L (ref 39–117)
ALT SERPL W P-5'-P-CCNC: 17 U/L (ref 1–41)
ANION GAP SERPL CALCULATED.3IONS-SCNC: 11 MMOL/L (ref 5–15)
AST SERPL-CCNC: 19 U/L (ref 1–40)
BASOPHILS # BLD AUTO: 0.03 10*3/MM3 (ref 0–0.2)
BASOPHILS NFR BLD AUTO: 0.4 % (ref 0–1.5)
BILIRUB SERPL-MCNC: 0.5 MG/DL (ref 0–1.2)
BUN SERPL-MCNC: 9 MG/DL (ref 8–23)
BUN/CREAT SERPL: 8.6 (ref 7–25)
CALCIUM SPEC-SCNC: 9.1 MG/DL (ref 8.6–10.5)
CHLORIDE SERPL-SCNC: 107 MMOL/L (ref 98–107)
CHOLEST SERPL-MCNC: 130 MG/DL (ref 0–200)
CO2 SERPL-SCNC: 24 MMOL/L (ref 22–29)
CREAT SERPL-MCNC: 1.05 MG/DL (ref 0.76–1.27)
DEPRECATED RDW RBC AUTO: 40.3 FL (ref 37–54)
EGFRCR SERPLBLD CKD-EPI 2021: 77.8 ML/MIN/1.73
EOSINOPHIL # BLD AUTO: 0.05 10*3/MM3 (ref 0–0.4)
EOSINOPHIL NFR BLD AUTO: 0.6 % (ref 0.3–6.2)
ERYTHROCYTE [DISTWIDTH] IN BLOOD BY AUTOMATED COUNT: 12.2 % (ref 12.3–15.4)
GLOBULIN UR ELPH-MCNC: 2 GM/DL
GLUCOSE SERPL-MCNC: 99 MG/DL (ref 65–99)
HBA1C MFR BLD: 6.1 % (ref 4.8–5.6)
HCT VFR BLD AUTO: 39.2 % (ref 37.5–51)
HDLC SERPL-MCNC: 59 MG/DL (ref 40–60)
HGB BLD-MCNC: 13.4 G/DL (ref 13–17.7)
IMM GRANULOCYTES # BLD AUTO: 0.03 10*3/MM3 (ref 0–0.05)
IMM GRANULOCYTES NFR BLD AUTO: 0.4 % (ref 0–0.5)
LDLC SERPL CALC-MCNC: 59 MG/DL (ref 0–100)
LDLC/HDLC SERPL: 1.01 {RATIO}
LYMPHOCYTES # BLD AUTO: 1.22 10*3/MM3 (ref 0.7–3.1)
LYMPHOCYTES NFR BLD AUTO: 14.3 % (ref 19.6–45.3)
MCH RBC QN AUTO: 31.6 PG (ref 26.6–33)
MCHC RBC AUTO-ENTMCNC: 34.2 G/DL (ref 31.5–35.7)
MCV RBC AUTO: 92.5 FL (ref 79–97)
MONOCYTES # BLD AUTO: 0.58 10*3/MM3 (ref 0.1–0.9)
MONOCYTES NFR BLD AUTO: 6.8 % (ref 5–12)
NEUTROPHILS NFR BLD AUTO: 6.63 10*3/MM3 (ref 1.7–7)
NEUTROPHILS NFR BLD AUTO: 77.5 % (ref 42.7–76)
NRBC BLD AUTO-RTO: 0.1 /100 WBC (ref 0–0.2)
PLATELET # BLD AUTO: 173 10*3/MM3 (ref 140–450)
PMV BLD AUTO: 10.5 FL (ref 6–12)
POTASSIUM SERPL-SCNC: 4.1 MMOL/L (ref 3.5–5.2)
PROT SERPL-MCNC: 6.2 G/DL (ref 6–8.5)
RBC # BLD AUTO: 4.24 10*6/MM3 (ref 4.14–5.8)
SODIUM SERPL-SCNC: 142 MMOL/L (ref 136–145)
TRIGL SERPL-MCNC: 57 MG/DL (ref 0–150)
TSH SERPL DL<=0.05 MIU/L-ACNC: 1.59 UIU/ML (ref 0.27–4.2)
VLDLC SERPL-MCNC: 12 MG/DL (ref 5–40)
WBC NRBC COR # BLD: 8.54 10*3/MM3 (ref 3.4–10.8)

## 2023-08-07 PROCEDURE — 36415 COLL VENOUS BLD VENIPUNCTURE: CPT

## 2023-08-07 PROCEDURE — 83036 HEMOGLOBIN GLYCOSYLATED A1C: CPT

## 2023-08-07 PROCEDURE — 84443 ASSAY THYROID STIM HORMONE: CPT

## 2023-08-07 PROCEDURE — 80053 COMPREHEN METABOLIC PANEL: CPT

## 2023-08-07 PROCEDURE — 80061 LIPID PANEL: CPT

## 2023-08-07 PROCEDURE — 85025 COMPLETE CBC W/AUTO DIFF WBC: CPT

## 2023-08-24 ENCOUNTER — OFFICE VISIT (OUTPATIENT)
Dept: FAMILY MEDICINE CLINIC | Facility: CLINIC | Age: 68
End: 2023-08-24
Payer: MEDICARE

## 2023-08-24 VITALS
HEART RATE: 102 BPM | SYSTOLIC BLOOD PRESSURE: 106 MMHG | TEMPERATURE: 98 F | BODY MASS INDEX: 24.2 KG/M2 | WEIGHT: 169 LBS | DIASTOLIC BLOOD PRESSURE: 74 MMHG | HEIGHT: 70 IN | OXYGEN SATURATION: 97 %

## 2023-08-24 DIAGNOSIS — E55.9 VITAMIN D DEFICIENCY: ICD-10-CM

## 2023-08-24 DIAGNOSIS — I48.19 ATRIAL FIBRILLATION, PERSISTENT: Primary | ICD-10-CM

## 2023-08-24 DIAGNOSIS — N18.2 STAGE 2 CHRONIC KIDNEY DISEASE: ICD-10-CM

## 2023-08-24 DIAGNOSIS — K21.00 GASTROESOPHAGEAL REFLUX DISEASE WITH ESOPHAGITIS WITHOUT HEMORRHAGE: ICD-10-CM

## 2023-08-24 DIAGNOSIS — Z86.2 HISTORY OF THROMBOCYTOPENIA: ICD-10-CM

## 2023-08-24 DIAGNOSIS — Z87.898 HISTORY OF PREDIABETES: ICD-10-CM

## 2023-08-24 DIAGNOSIS — Z98.890 HISTORY OF BACK SURGERY: ICD-10-CM

## 2023-08-24 DIAGNOSIS — N40.1 BENIGN PROSTATIC HYPERPLASIA WITH LOWER URINARY TRACT SYMPTOMS, SYMPTOM DETAILS UNSPECIFIED: ICD-10-CM

## 2023-08-24 DIAGNOSIS — I10 ESSENTIAL HYPERTENSION: ICD-10-CM

## 2023-08-24 DIAGNOSIS — E78.2 MIXED HYPERLIPIDEMIA: ICD-10-CM

## 2023-08-24 DIAGNOSIS — K29.70 GASTRITIS WITHOUT BLEEDING, UNSPECIFIED CHRONICITY, UNSPECIFIED GASTRITIS TYPE: ICD-10-CM

## 2023-09-01 ENCOUNTER — TRANSCRIBE ORDERS (OUTPATIENT)
Dept: LAB | Facility: HOSPITAL | Age: 68
End: 2023-09-01
Payer: MEDICARE

## 2023-09-01 ENCOUNTER — LAB (OUTPATIENT)
Dept: LAB | Facility: HOSPITAL | Age: 68
End: 2023-09-01
Payer: MEDICARE

## 2023-09-01 DIAGNOSIS — N13.8 BPH WITH URINARY OBSTRUCTION: Primary | ICD-10-CM

## 2023-09-01 DIAGNOSIS — N13.8 BPH WITH URINARY OBSTRUCTION: ICD-10-CM

## 2023-09-01 DIAGNOSIS — N40.1 BPH WITH URINARY OBSTRUCTION: ICD-10-CM

## 2023-09-01 DIAGNOSIS — N40.1 BPH WITH URINARY OBSTRUCTION: Primary | ICD-10-CM

## 2023-09-01 LAB — PSA SERPL-MCNC: 1.46 NG/ML (ref 0–4)

## 2023-09-01 PROCEDURE — 84153 ASSAY OF PSA TOTAL: CPT

## 2024-11-04 NOTE — PATIENT INSTRUCTIONS
Preventing Type 2 Diabetes Mellitus  Type 2 diabetes (type 2 diabetes mellitus) is a long-term (chronic) disease that affects blood sugar (glucose) levels. Normally, a hormone called insulin allows glucose to enter cells in the body. The cells use glucose for energy. In type 2 diabetes, one or both of these problems may be present:  · The body does not make enough insulin.  · The body does not respond properly to insulin that it makes (insulin resistance).  Insulin resistance or lack of insulin causes excess glucose to build up in the blood instead of going into cells. As a result, high blood glucose (hyperglycemia) develops, which can cause many complications. Being overweight or obese and having an inactive (sedentary) lifestyle can increase your risk for diabetes. Type 2 diabetes can be delayed or prevented by making certain nutrition and lifestyle changes.  What nutrition changes can be made?    · Eat healthy meals and snacks regularly. Keep a healthy snack with you for when you get hungry between meals, such as fruit or a handful of nuts.  · Eat lean meats and proteins that are low in saturated fats, such as chicken, fish, egg whites, and beans. Avoid processed meats.  · Eat plenty of fruits and vegetables and plenty of grains that have not been processed (whole grains). It is recommended that you eat:  ? 1½?2 cups of fruit every day.  ? 2½?3 cups of vegetables every day.  ? 6?8 oz of whole grains every day, such as oats, whole wheat, bulgur, brown rice, quinoa, and millet.  · Eat low-fat dairy products, such as milk, yogurt, and cheese.  · Eat foods that contain healthy fats, such as nuts, avocado, olive oil, and canola oil.  · Drink water throughout the day. Avoid drinks that contain added sugar, such as soda or sweet tea.  · Follow instructions from your health care provider about specific eating or drinking restrictions.  · Control how much food you eat at a time (portion size).  ? Check food  "Anesthesia Transfer of Care Note    Patient: Yang Fair    Procedure(s) Performed:Colonoscopy    Patient location: GI    Anesthesia Type: general    Transport from OR: Transported from OR on room air with adequate spontaneous ventilation. Continuous ECG monitoring in transport. Continuous SpO2 monitoring in transport    Post pain: adequate analgesia    Post assessment: no apparent anesthetic complications    Post vital signs: stable    Level of consciousness: responds to stimulation, awake and sedated    Nausea/Vomiting: no nausea/vomiting    Complications: none    Transfer of care protocol was followed      Last vitals: Visit Vitals  BP (!) 131/92 (BP Location: Right arm, Patient Position: Lying)   Pulse 99   Temp 36.6 °C (97.9 °F) (Oral)   Resp 20   Ht 5' 6" (1.676 m)   Wt 71.7 kg (158 lb)   SpO2 99%   BMI 25.50 kg/m²     " labels to find out the serving sizes of foods.  ? Use a kitchen scale to weigh amounts of foods.  · Saute or steam food instead of frying it. Cook with water or broth instead of oils or butter.  · Limit your intake of:  ? Salt (sodium). Have no more than 1 tsp (2,400 mg) of sodium a day. If you have heart disease or high blood pressure, have less than ½?¾ tsp (1,500 mg) of sodium a day.  ? Saturated fat. This is fat that is solid at room temperature, such as butter or fat on meat.  What lifestyle changes can be made?  Activity    · Do moderate-intensity physical activity for at least 30 minutes on at least 5 days of the week, or as much as told by your health care provider.  · Ask your health care provider what activities are safe for you. A mix of physical activities may be best, such as walking, swimming, cycling, and strength training.  · Try to add physical activity into your day. For example:  ? Park in spots that are farther away than usual, so that you walk more. For example, park in a far corner of the parking lot when you go to the office or the grocery store.  ? Take a walk during your lunch break.  ? Use stairs instead of elevators or escalators.  Weight Loss  · Lose weight as directed. Your health care provider can determine how much weight loss is best for you and can help you lose weight safely.  · If you are overweight or obese, you may be instructed to lose at least 5?7 % of your body weight.  Alcohol and Tobacco    · Limit alcohol intake to no more than 1 drink a day for nonpregnant women and 2 drinks a day for men. One drink equals 12 oz of beer, 5 oz of wine, or 1½ oz of hard liquor.  · Do not use any tobacco products, such as cigarettes, chewing tobacco, and e-cigarettes. If you need help quitting, ask your health care provider.  Work With Your Health Care Provider  · Have your blood glucose tested regularly, as told by your health care provider.  · Discuss your risk factors and how you can  reduce your risk for diabetes.  · Get screening tests as told by your health care provider. You may have screening tests regularly, especially if you have certain risk factors for type 2 diabetes.  · Make an appointment with a diet and nutrition specialist (registered dietitian). A registered dietitian can help you make a healthy eating plan and can help you understand portion sizes and food labels.  Why are these changes important?  · It is possible to prevent or delay type 2 diabetes and related health problems by making lifestyle and nutrition changes.  · It can be difficult to recognize signs of type 2 diabetes. The best way to avoid possible damage to your body is to take actions to prevent the disease before you develop symptoms.  What can happen if changes are not made?  · Your blood glucose levels may keep increasing. Having high blood glucose for a long time is dangerous. Too much glucose in your blood can damage your blood vessels, heart, kidneys, nerves, and eyes.  · You may develop prediabetes or type 2 diabetes. Type 2 diabetes can lead to many chronic health problems and complications, such as:  ? Heart disease.  ? Stroke.  ? Blindness.  ? Kidney disease.  ? Depression.  ? Poor circulation in the feet and legs, which could lead to surgical removal (amputation) in severe cases.  Where to find support  · Ask your health care provider to recommend a registered dietitian, diabetes educator, or weight loss program.  · Look for local or online weight loss groups.  · Join a gym, fitness club, or outdoor activity group, such as a walking club.  Where to find more information  To learn more about diabetes and diabetes prevention, visit:  · American Diabetes Association (ADA): www.diabetes.org  · National [place] of Diabetes and Digestive and Kidney Diseases: www.niddk.nih.gov/health-information/diabetes  To learn more about healthy eating, visit:  · The U.S. Department of Agriculture (USDA), Choose My Plate:  www.choosemyplate.gov/food-groups  · Office of Disease Prevention and Health Promotion (ODPHP), Dietary Guidelines: www.health.gov/dietaryguidelines  Summary  · You can reduce your risk for type 2 diabetes by increasing your physical activity, eating healthy foods, and losing weight as directed.  · Talk with your health care provider about your risk for type 2 diabetes. Ask about any blood tests or screening tests that you need to have.  This information is not intended to replace advice given to you by your health care provider. Make sure you discuss any questions you have with your health care provider.  Document Released: 04/10/2017 Document Revised: 04/10/2020 Document Reviewed: 02/07/2017  [employer] Patient Education © 2020 [employer]    Prediabetes Eating Plan  Prediabetes is a condition that causes blood sugar (glucose) levels to be higher than normal. This increases the risk for developing diabetes. In order to prevent diabetes from developing, your health care provider may recommend a diet and other lifestyle changes to help you:  · Control your blood glucose levels.  · Improve your cholesterol levels.  · Manage your blood pressure.  Your health care provider may recommend working with a diet and nutrition specialist (dietitian) to make a meal plan that is best for you.  What are tips for following this plan?  Lifestyle  · Set weight loss goals with the help of your health care team. It is recommended that most people with prediabetes lose 7% of their current body weight.  · Exercise for at least 30 minutes at least 5 days a week.  · Attend a support group or seek ongoing support from a mental health counselor.  · Take over-the-counter and prescription medicines only as told by your health care provider.  Reading food labels  · Read food labels to check the amount of fat, salt (sodium), and sugar in prepackaged foods. Avoid foods that have:  ? Saturated fats.  ? Trans fats.  ? Added sugars.  · Avoid foods  that have more than 300 milligrams (mg) of sodium per serving. Limit your daily sodium intake to less than 2,300 mg each day.  Shopping  · Avoid buying pre-made and processed foods.  Cooking  · Cook with olive oil. Do not use butter, lard, or ghee.  · Bake, broil, grill, or boil foods. Avoid frying.  Meal planning    · Work with your dietitian to develop an eating plan that is right for you. This may include:  ? Tracking how many calories you take in. Use a food diary, notebook, or mobile application to track what you eat at each meal.  ? Using the glycemic index (GI) to plan your meals. The index tells you how quickly a food will raise your blood glucose. Choose low-GI foods. These foods take a longer time to raise blood glucose.  · Consider following a Mediterranean diet. This diet includes:  ? Several servings each day of fresh fruits and vegetables.  ? Eating fish at least twice a week.  ? Several servings each day of whole grains, beans, nuts, and seeds.  ? Using olive oil instead of other fats.  ? Moderate alcohol consumption.  ? Eating small amounts of red meat and whole-fat dairy.  · If you have high blood pressure, you may need to limit your sodium intake or follow a diet such as the DASH eating plan. DASH is an eating plan that aims to lower high blood pressure.  What foods are recommended?  The items listed below may not be a complete list. Talk with your dietitian about what dietary choices are best for you.  Grains  Whole grains, such as whole-wheat or whole-grain breads, crackers, cereals, and pasta. Unsweetened oatmeal. Bulgur. Barley. Quinoa. Brown rice. Corn or whole-wheat flour tortillas or taco shells.  Vegetables  Lettuce. Spinach. Peas. Beets. Cauliflower. Cabbage. Broccoli. Carrots. Tomatoes. Squash. Eggplant. Herbs. Peppers. Onions. Cucumbers. [place] sprouts.  Fruits  Berries. Bananas. Apples. Oranges. Grapes. Papaya. [place]. Pomegranate. Kiwi. Grapefruit. Cherries.  Meats and other protein  foods  Seafood. Poultry without skin. Lean cuts of pork and beef. Tofu. Eggs. Nuts. Beans.  Dairy  Low-fat or fat-free dairy products, such as yogurt, cottage cheese, and cheese.  Beverages  Water. Tea. Coffee. Sugar-free or diet soda. [place] water. Lowfat or no-fat milk. Milk alternatives, such as soy or almond milk.  Fats and oils  Olive oil. Canola oil. Sunflower oil. Grapeseed oil. Avocado. Walnuts.  Sweets and desserts  Sugar-free or low-fat pudding. Sugar-free or low-fat ice cream and other frozen treats.  Seasoning and other foods  Herbs. Sodium-free spices. Mustard. Relish. Low-fat, low-sugar ketchup. Low-fat, low-sugar barbecue sauce. Low-fat or fat-free mayonnaise.  What foods are not recommended?  The items listed below may not be a complete list. Talk with your dietitian about what dietary choices are best for you.  Grains  Refined white flour and flour products, such as bread, pasta, snack foods, and cereals.  Vegetables  Canned vegetables. Frozen vegetables with butter or cream sauce.  Fruits  Fruits canned with syrup.  Meats and other protein foods  Fatty cuts of meat. Poultry with skin. Breaded or fried meat. Processed meats.  Dairy  Full-fat yogurt, cheese, or milk.  Beverages  Sweetened drinks, such as sweet iced tea and soda.  Fats and oils  Butter. Lard. Ghee.  Sweets and desserts  Baked goods, such as cake, cupcakes, pastries, cookies, and cheesecake.  Seasoning and other foods  Spice mixes with added salt. Ketchup. Barbecue sauce. Mayonnaise.  Summary  · To prevent diabetes from developing, you may need to make diet and other lifestyle changes to help control blood sugar, improve cholesterol levels, and manage your blood pressure.  · Set weight loss goals with the help of your health care team. It is recommended that most people with prediabetes lose 7 percent of their current body weight.  · Consider following a Mediterranean diet that includes plenty of fresh fruits and vegetables, whole  grains, beans, nuts, seeds, fish, lean meat, low-fat dairy, and healthy oils.  This information is not intended to replace advice given to you by your health care provider. Make sure you discuss any questions you have with your health care provider.  Document Released: 05/03/2016 Document Revised: 04/10/2020 Document Reviewed: 02/21/2018  [employer] Patient Education © 2020 [employer]    Prediabetes  Prediabetes is the condition of having a blood sugar (blood glucose) level that is higher than it should be, but not high enough for you to be diagnosed with type 2 diabetes. Having prediabetes puts you at risk for developing type 2 diabetes (type 2 diabetes mellitus). Prediabetes may be called impaired glucose tolerance or impaired fasting glucose.  Prediabetes usually does not cause symptoms. Your health care provider can diagnose this condition with blood tests. You may be tested for prediabetes if you are overweight and if you have at least one other risk factor for prediabetes.  What is blood glucose, and how is it measured?  Blood glucose refers to the amount of glucose in your bloodstream. Glucose comes from eating foods that contain sugars and starches (carbohydrates), which the body breaks down into glucose. Your blood glucose level may be measured in mg/dL (milligrams per deciliter) or mmol/L (millimoles per liter). Your blood glucose may be checked with one or more of the following blood tests:  · A fasting blood glucose (FBG) test. You will not be allowed to eat (you will fast) for 8 hours or longer before a blood sample is taken.  ? A normal range for FBG is  mg/dl (3.9-5.6 mmol/L).  · An A1c (hemoglobin A1c) blood test. This test provides information about blood glucose control over the previous 2?3 months.  · An oral glucose tolerance test (OGTT). This test measures your blood glucose at two times:  ? After fasting. This is your baseline level.  ? Two hours after you drink a beverage that contains  glucose.  You may be diagnosed with prediabetes:  · If your FBG is 100?125 mg/dL (5.6-6.9 mmol/L).  · If your A1c level is 5.7?6.4%.  · If your OGTT result is 140?199 mg/dL (7.8-11 mmol/L).  These blood tests may be repeated to confirm your diagnosis.  How can this condition affect me?  The pancreas produces a hormone (insulin) that helps to move glucose from the bloodstream into cells. When cells in the body do not respond properly to insulin that the body makes (insulin resistance), excess glucose builds up in the blood instead of going into cells. As a result, high blood glucose (hyperglycemia) can develop, which can cause many complications. Hyperglycemia is a symptom of prediabetes.  Having high blood glucose for a long time is dangerous. Too much glucose in your blood can damage your nerves and blood vessels. Long-term damage can lead to complications from diabetes, which may include:  · Heart disease.  · Stroke.  · Blindness.  · Kidney disease.  · Depression.  · Poor circulation in the feet and legs, which could lead to surgical removal (amputation) in severe cases.  What can increase my risk?  Risk factors for prediabetes include:  · Having a family member with type 2 diabetes.  · Being overweight or obese.  · Being older than age 45.  · Being of , -American, /, or / descent.  · Having an inactive (sedentary) lifestyle.  · Having a history of heart disease.  · History of gestational diabetes or polycystic ovary syndrome (PCOS), in women.  · Having low levels of good cholesterol (HDL-C) or high levels of blood fats (triglycerides).  · Having high blood pressure.  What actions can I take to prevent diabetes?         · Be physically active.  ? Do moderate-intensity physical activity for 30 or more minutes on 5 or more days of the week, or as much as told by your health care provider. This could be brisk walking, biking, or water aerobics.  ? Ask your  health care provider what activities are safe for you. A mix of physical activities may be best, such as walking, swimming, cycling, and strength training.  · Lose weight as told by your health care provider.  ? Losing 5-7% of your body weight can reverse insulin resistance.  ? Your health care provider can determine how much weight loss is best for you and can help you lose weight safely.  · Follow a healthy meal plan. This includes eating lean proteins, complex carbohydrates, fresh fruits and vegetables, low-fat dairy products, and healthy fats.  ? Follow instructions from your health care provider about eating or drinking restrictions.  ? Make an appointment to see a diet and nutrition specialist (registered dietitian) to help you create a healthy eating plan that is right for you.  · Do not smoke or use any tobacco products, such as cigarettes, chewing tobacco, and e-cigarettes. If you need help quitting, ask your health care provider.  · Take over-the-counter and prescription medicines as told by your health care provider. You may be prescribed medicines that help lower the risk of type 2 diabetes.  · Keep all follow-up visits as told by your health care provider. This is important.  Summary  · Prediabetes is the condition of having a blood sugar (blood glucose) level that is higher than it should be, but not high enough for you to be diagnosed with type 2 diabetes.  · Having prediabetes puts you at risk for developing type 2 diabetes (type 2 diabetes mellitus).  · To help prevent type 2 diabetes, make lifestyle changes such as being physically active and eating a healthy diet. Lose weight as told by your health care provider.  This information is not intended to replace advice given to you by your health care provider. Make sure you discuss any questions you have with your health care provider.  Document Released: 04/10/2017 Document Revised: 04/10/2020 Document Reviewed: 02/07/2017  [employer] Patient Education  © 2020 [employer]    Thrombocytopenia  Thrombocytopenia is a condition in which you have a low number of platelets in your blood. Platelets are also called thrombocytes. Platelets are tiny cells in the blood. When you bleed, they clump together at the cut or injury to stop the bleeding. This is called blood clotting. Not having enough platelets can cause bleeding problems. Some cases of thrombocytopenia are mild while others are more severe.  What are the causes?  This condition may be caused by:  · Decreased production of platelets. This may be caused by:  ? Aplastic anemia. This is when your bone marrow stops making blood cells.  ? Cancer in the bone marrow.  ? Certain medicines, including chemotherapy.  ? Infection in the bone marrow.  ? Drinking a lot of alcohol.  · Increased destruction of platelets. This may be caused by:  ? Certain immune diseases.  ? Certain medicines.  ? Certain blood clotting disorders.  ? Certain inherited disorders.  ? Certain bleeding disorders.  ? Pregnancy.  ? Having an enlarged spleen (hypersplenism). In hypersplenism, the spleen gathers up platelets from circulation. This means that the platelets are not available to help with blood clotting. The spleen can be enlarged because of cirrhosis or other conditions.  What are the signs or symptoms?  Symptoms of this condition are the result of poor blood clotting. They will vary depending on how low the platelet counts are. Symptoms may include:  · Abnormal bleeding.  · Nosebleeds.  · Heavy menstrual periods.  · Blood in the urine or stool (feces).  · A purplish discoloration in the skin (purpura).  · Bruising.  · A rash that looks like pinpoint, purplish-red spots (petechiae) on the skin and mucous membranes.  How is this diagnosed?    This condition may be diagnosed with blood tests and a physical exam. Sometimes, a sample of bone marrow may be removed to look for the original cells (megakaryocytes) that make platelets. Other tests  may be needed depending on the cause.  How is this treated?  Treatment for this condition depends on the cause. Treatment options may include:  · Treatment of another condition that is causing the low platelet count.  · Medicines to help protect your platelets from being destroyed.  · A replacement (transfusion) of platelets to stop or prevent bleeding.  · Surgery to remove the spleen.  Follow these instructions at home:  Activity  · Avoid activities that could cause injury or bruising, and follow instructions about how to prevent falls.  · Take extra care not to cut yourself when you shave or when you use scissors, needles, knives, and other tools.  · Take extra care to protect yourself from burns when ironing or cooking.  General instructions    · Check your skin and the inside of your mouth for bruising or bleeding as told by your health care provider.  · Check your spit (sputum), urine, and stool for blood as told by your health care provider.  · Do not drink alcohol.  · Take over-the-counter and prescription medicines only as told by your health care provider.  · Do not take any medicines that have aspirin or NSAIDs in them. These medicines can thin your blood and cause you to bleed more easily.  · Tell all your health care providers, including dentists and eye doctors, about your condition.  Contact a health care provider if you have:  · Unexplained bruising.  Get help right away if you have:  · Active bleeding from anywhere on your body.  · Blood in your sputum, urine, or stool.  Summary  · Thrombocytopenia is a condition in which you have a low number of platelets in your blood.  · Platelets are needed for blood clotting.  · Symptoms of this condition are the result of poor blood clotting and may include abnormal bleeding, nosebleeds, and bruising.  · This condition may be diagnosed with blood tests and a physical exam.  · Treatment for this condition depends on the cause.  This information is not intended  to replace advice given to you by your health care provider. Make sure you discuss any questions you have with your health care provider.  Document Released: 12/18/2006 Document Revised: 09/19/2019 Document Reviewed: 09/19/2019  [employer] Patient Education © 2020 [employer]    Preventing Type 2 Diabetes Mellitus  Type 2 diabetes (type 2 diabetes mellitus) is a long-term (chronic) disease that affects blood sugar (glucose) levels. Normally, a hormone called insulin allows glucose to enter cells in the body. The cells use glucose for energy. In type 2 diabetes, one or both of these problems may be present:  · The body does not make enough insulin.  · The body does not respond properly to insulin that it makes (insulin resistance).  Insulin resistance or lack of insulin causes excess glucose to build up in the blood instead of going into cells. As a result, high blood glucose (hyperglycemia) develops, which can cause many complications. Being overweight or obese and having an inactive (sedentary) lifestyle can increase your risk for diabetes. Type 2 diabetes can be delayed or prevented by making certain nutrition and lifestyle changes.  What nutrition changes can be made?    · Eat healthy meals and snacks regularly. Keep a healthy snack with you for when you get hungry between meals, such as fruit or a handful of nuts.  · Eat lean meats and proteins that are low in saturated fats, such as chicken, fish, egg whites, and beans. Avoid processed meats.  · Eat plenty of fruits and vegetables and plenty of grains that have not been processed (whole grains). It is recommended that you eat:  ? 1½?2 cups of fruit every day.  ? 2½?3 cups of vegetables every day.  ? 6?8 oz of whole grains every day, such as oats, whole wheat, bulgur, brown rice, quinoa, and millet.  · Eat low-fat dairy products, such as milk, yogurt, and cheese.  · Eat foods that contain healthy fats, such as nuts, avocado, olive oil, and canola oil.  · Drink  water throughout the day. Avoid drinks that contain added sugar, such as soda or sweet tea.  · Follow instructions from your health care provider about specific eating or drinking restrictions.  · Control how much food you eat at a time (portion size).  ? Check food labels to find out the serving sizes of foods.  ? Use a kitchen scale to weigh amounts of foods.  · Saute or steam food instead of frying it. Cook with water or broth instead of oils or butter.  · Limit your intake of:  ? Salt (sodium). Have no more than 1 tsp (2,400 mg) of sodium a day. If you have heart disease or high blood pressure, have less than ½?¾ tsp (1,500 mg) of sodium a day.  ? Saturated fat. This is fat that is solid at room temperature, such as butter or fat on meat.  What lifestyle changes can be made?  Activity    · Do moderate-intensity physical activity for at least 30 minutes on at least 5 days of the week, or as much as told by your health care provider.  · Ask your health care provider what activities are safe for you. A mix of physical activities may be best, such as walking, swimming, cycling, and strength training.  · Try to add physical activity into your day. For example:  ? Park in spots that are farther away than usual, so that you walk more. For example, park in a far corner of the parking lot when you go to the office or the grocery store.  ? Take a walk during your lunch break.  ? Use stairs instead of elevators or escalators.  Weight Loss  · Lose weight as directed. Your health care provider can determine how much weight loss is best for you and can help you lose weight safely.  · If you are overweight or obese, you may be instructed to lose at least 5?7 % of your body weight.  Alcohol and Tobacco    · Limit alcohol intake to no more than 1 drink a day for nonpregnant women and 2 drinks a day for men. One drink equals 12 oz of beer, 5 oz of wine, or 1½ oz of hard liquor.  · Do not use any tobacco products, such as  cigarettes, chewing tobacco, and e-cigarettes. If you need help quitting, ask your health care provider.  Work With Your Health Care Provider  · Have your blood glucose tested regularly, as told by your health care provider.  · Discuss your risk factors and how you can reduce your risk for diabetes.  · Get screening tests as told by your health care provider. You may have screening tests regularly, especially if you have certain risk factors for type 2 diabetes.  · Make an appointment with a diet and nutrition specialist (registered dietitian). A registered dietitian can help you make a healthy eating plan and can help you understand portion sizes and food labels.  Why are these changes important?  · It is possible to prevent or delay type 2 diabetes and related health problems by making lifestyle and nutrition changes.  · It can be difficult to recognize signs of type 2 diabetes. The best way to avoid possible damage to your body is to take actions to prevent the disease before you develop symptoms.  What can happen if changes are not made?  · Your blood glucose levels may keep increasing. Having high blood glucose for a long time is dangerous. Too much glucose in your blood can damage your blood vessels, heart, kidneys, nerves, and eyes.  · You may develop prediabetes or type 2 diabetes. Type 2 diabetes can lead to many chronic health problems and complications, such as:  ? Heart disease.  ? Stroke.  ? Blindness.  ? Kidney disease.  ? Depression.  ? Poor circulation in the feet and legs, which could lead to surgical removal (amputation) in severe cases.  Where to find support  · Ask your health care provider to recommend a registered dietitian, diabetes educator, or weight loss program.  · Look for local or online weight loss groups.  · Join a gym, fitness club, or outdoor activity group, such as a walking club.  Where to find more information  To learn more about diabetes and diabetes prevention,  visit:  · American Diabetes Association (ADA): www.diabetes.org  · National [place] of Diabetes and Digestive and Kidney Diseases: www.niddk.nih.gov/health-information/diabetes  To learn more about healthy eating, visit:  · The U.S. Department of Agriculture (USDA), Choose My Plate: www.GreenBytesmyplate.gov/food-groups  · Office of Disease Prevention and Health Promotion (ODPHP), Dietary Guidelines: www.health.gov/dietaryguidelines  Summary  · You can reduce your risk for type 2 diabetes by increasing your physical activity, eating healthy foods, and losing weight as directed.  · Talk with your health care provider about your risk for type 2 diabetes. Ask about any blood tests or screening tests that you need to have.  This information is not intended to replace advice given to you by your health care provider. Make sure you discuss any questions you have with your health care provider.  Document Released: 04/10/2017 Document Revised: 04/10/2020 Document Reviewed: 02/07/2017  [employer] Patient Education © 2020 [employer]    Prediabetes Eating Plan  Prediabetes is a condition that causes blood sugar (glucose) levels to be higher than normal. This increases the risk for developing diabetes. In order to prevent diabetes from developing, your health care provider may recommend a diet and other lifestyle changes to help you:  · Control your blood glucose levels.  · Improve your cholesterol levels.  · Manage your blood pressure.  Your health care provider may recommend working with a diet and nutrition specialist (dietitian) to make a meal plan that is best for you.  What are tips for following this plan?  Lifestyle  · Set weight loss goals with the help of your health care team. It is recommended that most people with prediabetes lose 7% of their current body weight.  · Exercise for at least 30 minutes at least 5 days a week.  · Attend a support group or seek ongoing support from a mental health counselor.  · Take  over-the-counter and prescription medicines only as told by your health care provider.  Reading food labels  · Read food labels to check the amount of fat, salt (sodium), and sugar in prepackaged foods. Avoid foods that have:  ? Saturated fats.  ? Trans fats.  ? Added sugars.  · Avoid foods that have more than 300 milligrams (mg) of sodium per serving. Limit your daily sodium intake to less than 2,300 mg each day.  Shopping  · Avoid buying pre-made and processed foods.  Cooking  · Cook with olive oil. Do not use butter, lard, or ghee.  · Bake, broil, grill, or boil foods. Avoid frying.  Meal planning    · Work with your dietitian to develop an eating plan that is right for you. This may include:  ? Tracking how many calories you take in. Use a food diary, notebook, or mobile application to track what you eat at each meal.  ? Using the glycemic index (GI) to plan your meals. The index tells you how quickly a food will raise your blood glucose. Choose low-GI foods. These foods take a longer time to raise blood glucose.  · Consider following a Mediterranean diet. This diet includes:  ? Several servings each day of fresh fruits and vegetables.  ? Eating fish at least twice a week.  ? Several servings each day of whole grains, beans, nuts, and seeds.  ? Using olive oil instead of other fats.  ? Moderate alcohol consumption.  ? Eating small amounts of red meat and whole-fat dairy.  · If you have high blood pressure, you may need to limit your sodium intake or follow a diet such as the DASH eating plan. DASH is an eating plan that aims to lower high blood pressure.  What foods are recommended?  The items listed below may not be a complete list. Talk with your dietitian about what dietary choices are best for you.  Grains  Whole grains, such as whole-wheat or whole-grain breads, crackers, cereals, and pasta. Unsweetened oatmeal. Bulgur. Barley. Quinoa. Brown rice. Corn or whole-wheat flour tortillas or taco  shells.  Vegetables  Lettuce. Spinach. Peas. Beets. Cauliflower. Cabbage. Broccoli. Carrots. Tomatoes. Squash. Eggplant. Herbs. Peppers. Onions. Cucumbers. [place] sprouts.  Fruits  Berries. Bananas. Apples. Oranges. Grapes. Papaya. [place]. Pomegranate. Kiwi. Grapefruit. Cherries.  Meats and other protein foods  Seafood. Poultry without skin. Lean cuts of pork and beef. Tofu. Eggs. Nuts. Beans.  Dairy  Low-fat or fat-free dairy products, such as yogurt, cottage cheese, and cheese.  Beverages  Water. Tea. Coffee. Sugar-free or diet soda. [place] water. Lowfat or no-fat milk. Milk alternatives, such as soy or almond milk.  Fats and oils  Olive oil. Canola oil. Sunflower oil. Grapeseed oil. Avocado. Walnuts.  Sweets and desserts  Sugar-free or low-fat pudding. Sugar-free or low-fat ice cream and other frozen treats.  Seasoning and other foods  Herbs. Sodium-free spices. Mustard. Relish. Low-fat, low-sugar ketchup. Low-fat, low-sugar barbecue sauce. Low-fat or fat-free mayonnaise.  What foods are not recommended?  The items listed below may not be a complete list. Talk with your dietitian about what dietary choices are best for you.  Grains  Refined white flour and flour products, such as bread, pasta, snack foods, and cereals.  Vegetables  Canned vegetables. Frozen vegetables with butter or cream sauce.  Fruits  Fruits canned with syrup.  Meats and other protein foods  Fatty cuts of meat. Poultry with skin. Breaded or fried meat. Processed meats.  Dairy  Full-fat yogurt, cheese, or milk.  Beverages  Sweetened drinks, such as sweet iced tea and soda.  Fats and oils  Butter. Lard. Ghee.  Sweets and desserts  Baked goods, such as cake, cupcakes, pastries, cookies, and cheesecake.  Seasoning and other foods  Spice mixes with added salt. Ketchup. Barbecue sauce. Mayonnaise.  Summary  · To prevent diabetes from developing, you may need to make diet and other lifestyle changes to help control blood sugar, improve cholesterol  levels, and manage your blood pressure.  · Set weight loss goals with the help of your health care team. It is recommended that most people with prediabetes lose 7 percent of their current body weight.  · Consider following a Mediterranean diet that includes plenty of fresh fruits and vegetables, whole grains, beans, nuts, seeds, fish, lean meat, low-fat dairy, and healthy oils.  This information is not intended to replace advice given to you by your health care provider. Make sure you discuss any questions you have with your health care provider.  Document Released: 05/03/2016 Document Revised: 04/10/2020 Document Reviewed: 02/21/2018  [employer] Patient Education © 2020 [employer]    Prediabetes  Prediabetes is the condition of having a blood sugar (blood glucose) level that is higher than it should be, but not high enough for you to be diagnosed with type 2 diabetes. Having prediabetes puts you at risk for developing type 2 diabetes (type 2 diabetes mellitus). Prediabetes may be called impaired glucose tolerance or impaired fasting glucose.  Prediabetes usually does not cause symptoms. Your health care provider can diagnose this condition with blood tests. You may be tested for prediabetes if you are overweight and if you have at least one other risk factor for prediabetes.  What is blood glucose, and how is it measured?  Blood glucose refers to the amount of glucose in your bloodstream. Glucose comes from eating foods that contain sugars and starches (carbohydrates), which the body breaks down into glucose. Your blood glucose level may be measured in mg/dL (milligrams per deciliter) or mmol/L (millimoles per liter). Your blood glucose may be checked with one or more of the following blood tests:  · A fasting blood glucose (FBG) test. You will not be allowed to eat (you will fast) for 8 hours or longer before a blood sample is taken.  ? A normal range for FBG is  mg/dl (3.9-5.6 mmol/L).  · An A1c  (hemoglobin A1c) blood test. This test provides information about blood glucose control over the previous 2?3 months.  · An oral glucose tolerance test (OGTT). This test measures your blood glucose at two times:  ? After fasting. This is your baseline level.  ? Two hours after you drink a beverage that contains glucose.  You may be diagnosed with prediabetes:  · If your FBG is 100?125 mg/dL (5.6-6.9 mmol/L).  · If your A1c level is 5.7?6.4%.  · If your OGTT result is 140?199 mg/dL (7.8-11 mmol/L).  These blood tests may be repeated to confirm your diagnosis.  How can this condition affect me?  The pancreas produces a hormone (insulin) that helps to move glucose from the bloodstream into cells. When cells in the body do not respond properly to insulin that the body makes (insulin resistance), excess glucose builds up in the blood instead of going into cells. As a result, high blood glucose (hyperglycemia) can develop, which can cause many complications. Hyperglycemia is a symptom of prediabetes.  Having high blood glucose for a long time is dangerous. Too much glucose in your blood can damage your nerves and blood vessels. Long-term damage can lead to complications from diabetes, which may include:  · Heart disease.  · Stroke.  · Blindness.  · Kidney disease.  · Depression.  · Poor circulation in the feet and legs, which could lead to surgical removal (amputation) in severe cases.  What can increase my risk?  Risk factors for prediabetes include:  · Having a family member with type 2 diabetes.  · Being overweight or obese.  · Being older than age 45.  · Being of , -American, /, or / descent.  · Having an inactive (sedentary) lifestyle.  · Having a history of heart disease.  · History of gestational diabetes or polycystic ovary syndrome (PCOS), in women.  · Having low levels of good cholesterol (HDL-C) or high levels of blood fats (triglycerides).  · Having high  blood pressure.  What actions can I take to prevent diabetes?         · Be physically active.  ? Do moderate-intensity physical activity for 30 or more minutes on 5 or more days of the week, or as much as told by your health care provider. This could be brisk walking, biking, or water aerobics.  ? Ask your health care provider what activities are safe for you. A mix of physical activities may be best, such as walking, swimming, cycling, and strength training.  · Lose weight as told by your health care provider.  ? Losing 5-7% of your body weight can reverse insulin resistance.  ? Your health care provider can determine how much weight loss is best for you and can help you lose weight safely.  · Follow a healthy meal plan. This includes eating lean proteins, complex carbohydrates, fresh fruits and vegetables, low-fat dairy products, and healthy fats.  ? Follow instructions from your health care provider about eating or drinking restrictions.  ? Make an appointment to see a diet and nutrition specialist (registered dietitian) to help you create a healthy eating plan that is right for you.  · Do not smoke or use any tobacco products, such as cigarettes, chewing tobacco, and e-cigarettes. If you need help quitting, ask your health care provider.  · Take over-the-counter and prescription medicines as told by your health care provider. You may be prescribed medicines that help lower the risk of type 2 diabetes.  · Keep all follow-up visits as told by your health care provider. This is important.  Summary  · Prediabetes is the condition of having a blood sugar (blood glucose) level that is higher than it should be, but not high enough for you to be diagnosed with type 2 diabetes.  · Having prediabetes puts you at risk for developing type 2 diabetes (type 2 diabetes mellitus).  · To help prevent type 2 diabetes, make lifestyle changes such as being physically active and eating a healthy diet. Lose weight as told by your  health care provider.  This information is not intended to replace advice given to you by your health care provider. Make sure you discuss any questions you have with your health care provider.  Document Released: 04/10/2017 Document Revised: 04/10/2020 Document Reviewed: 02/07/2017  Elsevier Patient Education © 2020 [employer]    Benign Prostatic Hyperplasia    Benign prostatic hyperplasia (BPH) is an enlarged prostate gland that is caused by the normal aging process and not by cancer. The prostate is a walnut-sized gland that is involved in the production of semen. It is located in front of the rectum and below the bladder. The bladder stores urine and the urethra is the tube that carries the urine out of the body. The prostate may get bigger as a man gets older.  An enlarged prostate can press on the urethra. This can make it harder to pass urine. The build-up of urine in the bladder can cause infection. Back pressure and infection may progress to bladder damage and kidney (renal) failure.  What are the causes?  This condition is part of a normal aging process. However, not all men develop problems from this condition. If the prostate enlarges away from the urethra, urine flow will not be blocked. If it enlarges toward the urethra and compresses it, there will be problems passing urine.  What increases the risk?  This condition is more likely to develop in men over the age of 50 years.  What are the signs or symptoms?  Symptoms of this condition include:  · Getting up often during the night to urinate.  · Needing to urinate frequently during the day.  · Difficulty starting urine flow.  · Decrease in size and strength of your urine stream.  · Leaking (dribbling) after urinating.  · Inability to pass urine. This needs immediate treatment.  · Inability to completely empty your bladder.  · Pain when you pass urine. This is more common if there is also an infection.  · Urinary tract infection (UTI).  How is this  diagnosed?  This condition is diagnosed based on your medical history, a physical exam, and your symptoms. Tests will also be done, such as:  · A post-void bladder scan. This measures any amount of urine that may remain in your bladder after you finish urinating.  · A digital rectal exam. In a rectal exam, your health care provider checks your prostate by putting a lubricated, gloved finger into your rectum to feel the back of your prostate gland. This exam detects the size of your gland and any abnormal lumps or growths.  · An exam of your urine (urinalysis).  · A prostate specific antigen (PSA) screening. This is a blood test used to screen for prostate cancer.  · An ultrasound. This test uses sound waves to electronically produce a picture of your prostate gland.  Your health care provider may refer you to a specialist in kidney and prostate diseases (urologist).  How is this treated?  Once symptoms begin, your health care provider will monitor your condition (active surveillance or watchful waiting). Treatment for this condition will depend on the severity of your condition. Treatment may include:  · Observation and yearly exams. This may be the only treatment needed if your condition and symptoms are mild.  · Medicines to relieve your symptoms, including:  ? Medicines to shrink the prostate.  ? Medicines to relax the muscle of the prostate.  · Surgery in severe cases. Surgery may include:  ? Prostatectomy. In this procedure, the prostate tissue is removed completely through an open incision or with a laparoscope or robotics.  ? Transurethral resection of the prostate (TURP). In this procedure, a tool is inserted through the opening at the tip of the penis (urethra). It is used to cut away tissue of the inner core of the prostate. The pieces are removed through the same opening of the penis. This removes the blockage.  ? Transurethral incision (TUIP). In this procedure, small cuts are made in the prostate. This  lessens the prostate's pressure on the urethra.  ? Transurethral microwave thermotherapy (TUMT). This procedure uses microwaves to create heat. The heat destroys and removes a small amount of prostate tissue.  ? Transurethral needle ablation (TUNA). This procedure uses radio frequencies to destroy and remove a small amount of prostate tissue.  ? Interstitial laser coagulation (ILC). This procedure uses a laser to destroy and remove a small amount of prostate tissue.  ? Transurethral electrovaporization (TUVP). This procedure uses electrodes to destroy and remove a small amount of prostate tissue.  ? Prostatic urethral lift. This procedure inserts an implant to push the lobes of the prostate away from the urethra.  Follow these instructions at home:  · Take over-the-counter and prescription medicines only as told by your health care provider.  · Monitor your symptoms for any changes. Contact your health care provider with any changes.  · Avoid drinking large amounts of liquid before going to bed or out in public.  · Avoid or reduce how much caffeine or alcohol you drink.  · Give yourself time when you urinate.  · Keep all follow-up visits as told by your health care provider. This is important.  Contact a health care provider if:  · You have unexplained back pain.  · Your symptoms do not get better with treatment.  · You develop side effects from the medicine you are taking.  · Your urine becomes very dark or has a bad smell.  · Your lower abdomen becomes distended and you have trouble passing your urine.  Get help right away if:  · You have a fever or chills.  · You suddenly cannot urinate.  · You feel lightheaded, or very dizzy, or you faint.  · There are large amounts of blood or clots in the urine.  · Your urinary problems become hard to manage.  · You develop moderate to severe low back or flank pain. The flank is the side of your body between the ribs and the hip.  These symptoms may represent a serious problem  that is an emergency. Do not wait to see if the symptoms will go away. Get medical help right away. Call your local emergency services (911 in the [place] Do not drive yourself to the hospital.  Summary  · Benign prostatic hyperplasia (BPH) is an enlarged prostate that is caused by the normal aging process and not by cancer.  · An enlarged prostate can press on the urethra. This can make it hard to pass urine.  · This condition is part of a normal aging process and is more likely to develop in men over the age of 50 years.  · Get help right away if you suddenly cannot urinate.  This information is not intended to replace advice given to you by your health care provider. Make sure you discuss any questions you have with your health care provider.  Document Released: 12/18/2006 Document Revised: 11/12/2019 Document Reviewed: 01/22/2018  Elsevier Patient Education © 2020 [employer]

## (undated) DEVICE — SINGLE-USE BIOPSY FORCEPS: Brand: RADIAL JAW 4

## (undated) DEVICE — BITEBLOCK ENDO W/STRAP 60F A/ LF DISP

## (undated) DEVICE — CLEANGUIDE DISPOSABLE MARKED SPRING TIP GUIDEWIRE, 1.86 MM X 210 CM: Brand: CLEANGUIDE